# Patient Record
Sex: MALE | Race: WHITE | NOT HISPANIC OR LATINO | Employment: OTHER | URBAN - NONMETROPOLITAN AREA
[De-identification: names, ages, dates, MRNs, and addresses within clinical notes are randomized per-mention and may not be internally consistent; named-entity substitution may affect disease eponyms.]

---

## 2017-06-07 ENCOUNTER — HOSPITAL ENCOUNTER (OUTPATIENT)
Dept: ULTRASOUND IMAGING | Facility: HOSPITAL | Age: 72
Discharge: HOME | End: 2017-06-07
Attending: UROLOGY | Admitting: UROLOGY

## 2017-06-07 ENCOUNTER — OFFICE VISIT (OUTPATIENT)
Dept: UROLOGY | Facility: CLINIC | Age: 72
End: 2017-06-07

## 2017-06-07 VITALS
SYSTOLIC BLOOD PRESSURE: 110 MMHG | HEIGHT: 68 IN | WEIGHT: 178 LBS | HEART RATE: 99 BPM | DIASTOLIC BLOOD PRESSURE: 79 MMHG | BODY MASS INDEX: 26.98 KG/M2

## 2017-06-07 DIAGNOSIS — N50.819 ORCHALGIA: ICD-10-CM

## 2017-06-07 DIAGNOSIS — N40.1 BENIGN NON-NODULAR PROSTATIC HYPERPLASIA WITH LOWER URINARY TRACT SYMPTOMS: Primary | ICD-10-CM

## 2017-06-07 LAB — PSA SERPL-MCNC: 0.66 NG/ML (ref 0–4)

## 2017-06-07 PROCEDURE — 99204 OFFICE O/P NEW MOD 45 MIN: CPT | Performed by: UROLOGY

## 2017-06-07 PROCEDURE — 84153 ASSAY OF PSA TOTAL: CPT | Performed by: UROLOGY

## 2017-06-07 PROCEDURE — 36415 COLL VENOUS BLD VENIPUNCTURE: CPT | Performed by: UROLOGY

## 2017-06-07 PROCEDURE — 76870 US EXAM SCROTUM: CPT

## 2017-06-07 PROCEDURE — 76870 US EXAM SCROTUM: CPT | Performed by: RADIOLOGY

## 2017-06-07 RX ORDER — FENOFIBRATE 160 MG/1
TABLET ORAL
Refills: 1 | COMMUNITY
Start: 2017-03-20

## 2017-06-07 RX ORDER — MELATONIN
1000 DAILY
COMMUNITY

## 2017-06-07 RX ORDER — ALPRAZOLAM 1 MG/1
TABLET ORAL
Refills: 5 | COMMUNITY
Start: 2017-05-19

## 2017-06-07 RX ORDER — ESCITALOPRAM OXALATE 10 MG/1
TABLET ORAL
Refills: 5 | COMMUNITY
Start: 2017-05-19

## 2017-06-07 RX ORDER — LEVOTHYROXINE SODIUM 0.07 MG/1
TABLET ORAL
Refills: 1 | COMMUNITY
Start: 2017-03-20

## 2017-06-07 RX ORDER — CETIRIZINE HYDROCHLORIDE 10 MG/1
TABLET ORAL
Refills: 3 | COMMUNITY
Start: 2017-03-20

## 2017-06-07 RX ORDER — EZETIMIBE 10 MG/1
TABLET ORAL
Refills: 1 | COMMUNITY
Start: 2017-03-20

## 2017-06-07 RX ORDER — POLYETHYLENE GLYCOL 3350 17 G/17G
POWDER, FOR SOLUTION ORAL
Refills: 5 | COMMUNITY
Start: 2017-05-30

## 2017-06-07 RX ORDER — ZOLPIDEM TARTRATE 10 MG/1
TABLET ORAL
Refills: 5 | COMMUNITY
Start: 2017-05-19

## 2017-06-07 RX ORDER — ESCITALOPRAM OXALATE 20 MG/1
TABLET ORAL
Refills: 5 | COMMUNITY
Start: 2017-05-19

## 2017-06-07 RX ORDER — AMLODIPINE BESYLATE 5 MG/1
TABLET ORAL
Refills: 1 | COMMUNITY
Start: 2017-03-20

## 2017-06-07 RX ORDER — TESTOSTERONE 12.5 MG/1.25G
25 GEL TOPICAL DAILY
COMMUNITY

## 2017-06-07 RX ORDER — PRAVASTATIN SODIUM 20 MG
TABLET ORAL
Refills: 1 | COMMUNITY
Start: 2017-03-20

## 2017-06-07 NOTE — PROGRESS NOTES
Chief Complaint:          Chief Complaint   Patient presents with   • Testicle Pain     knot in left testicle after Vasectomy in 1973 / pain started about 10-12 years ago.   • Prostate Check       HPI:   71 y.o. male.  Pt noticed a knot in the left scrotum after his vasectomy 40 years ago.  He is also here for a prostate check.  His lesion in the left scrotum has caused more and more pain this past year  HPI        Past Medical History:        Past Medical History:   Diagnosis Date   • Anxiety disorder    • Arthritis    • Depression    • High blood pressure    • High cholesterol    • High triglycerides    • Panic attacks    • Thyroid disorder          Current Meds:     Current Outpatient Prescriptions   Medication Sig Dispense Refill   • ALPRAZolam (XANAX) 1 MG tablet   5   • amLODIPine (NORVASC) 5 MG tablet   1   • cetirizine (zyrTEC) 10 MG tablet   3   • cholecalciferol (VITAMIN D3) 1000 UNITS tablet Take 1,000 Units by mouth Daily.     • escitalopram (LEXAPRO) 10 MG tablet   5   • escitalopram (LEXAPRO) 20 MG tablet   5   • fenofibrate 160 MG tablet   1   • levothyroxine (SYNTHROID, LEVOTHROID) 75 MCG tablet   1   • LYRICA 100 MG capsule   2   • polyethylene glycol (MIRALAX) powder   5   • pravastatin (PRAVACHOL) 20 MG tablet   1   • testosterone (ANDROGEL) 25 MG/2.5GM (1%) gel gel Place 25 mg on the skin Daily.     • ZETIA 10 MG tablet   1   • zolpidem (AMBIEN) 10 MG tablet   5     No current facility-administered medications for this visit.         Allergies:      Allergies   Allergen Reactions   • Aspirin    • Nsaids         Past Surgical History:     Past Surgical History:   Procedure Laterality Date   • NECK SURGERY      disc fusion         Social History:     Social History     Social History   • Marital status: Unknown     Spouse name: N/A   • Number of children: N/A   • Years of education: N/A     Occupational History   • Not on file.     Social History Main Topics   • Smoking status: Never Smoker   •  Smokeless tobacco: Never Used   • Alcohol use No   • Drug use: No   • Sexual activity: Not on file     Other Topics Concern   • Not on file     Social History Narrative   • No narrative on file       Family History:     Family History   Problem Relation Age of Onset   • Prostate cancer Brother    • Heart disease Brother        Review of Systems:     Review of Systems   Genitourinary: Positive for testicular pain.   All other systems reviewed and are negative.      Physical Exam:     Physical Exam   Constitutional: He is oriented to person, place, and time.   HENT:   Head: Normocephalic and atraumatic.   Right Ear: External ear normal.   Left Ear: External ear normal.   Nose: Nose normal.   Mouth/Throat: Oropharynx is clear and moist.   Eyes: Conjunctivae and EOM are normal. Pupils are equal, round, and reactive to light.   Neck: Normal range of motion. Neck supple. No thyromegaly present.   Cardiovascular: Normal rate, regular rhythm, normal heart sounds and intact distal pulses.    No murmur heard.  Pulmonary/Chest: Effort normal and breath sounds normal. No respiratory distress. He has no wheezes. He has no rales. He exhibits no tenderness.   Abdominal: Soft. Bowel sounds are normal. He exhibits no distension and no mass. There is no tenderness. No hernia.   Genitourinary: Rectum normal, prostate normal and penis normal.   Musculoskeletal: Normal range of motion. He exhibits no edema or tenderness.   Lymphadenopathy:     He has no cervical adenopathy.   Neurological: He is alert and oriented to person, place, and time. No cranial nerve deficit. He exhibits normal muscle tone. Coordination normal.   Skin: Skin is warm. No rash noted.   Psychiatric: He has a normal mood and affect. His behavior is normal. Judgment and thought content normal.   Nursing note and vitals reviewed.      Procedure:     No notes on file      Assessment:     Encounter Diagnoses   Name Primary?   • Benign non-nodular prostatic hyperplasia  with lower urinary tract symptoms Yes   • Orchalgia      Orders Placed This Encounter   Procedures   • US Scrotum & Testicles     Standing Status:   Future     Standing Expiration Date:   6/7/2018     Order Specific Question:   Reason for Exam:     Answer:   scrotal lesion   • PSA       Plan:   Will check a psa today and will order a scrotal ultrasound.  His exam today just showed testicular atrophy.    Counseling was given to patient for the following topics impressions. and the interim medical history and current results were reviewed.  A treatment plan with follow-up was made. Total time of the encounter was 45 minutes and 45 minutes were spent discussing Benign non-nodular prostatic hyperplasia with lower urinary tract symptoms [N40.1] face-to-face.        This document has been electronically signed by Chaitanya Gregg MD June 7, 2017 1:36 PM

## 2017-08-01 ENCOUNTER — OFFICE VISIT (OUTPATIENT)
Dept: UROLOGY | Facility: CLINIC | Age: 72
End: 2017-08-01

## 2017-08-01 VITALS — WEIGHT: 178 LBS | HEIGHT: 68 IN | BODY MASS INDEX: 26.98 KG/M2

## 2017-08-01 DIAGNOSIS — N50.819 ORCHALGIA: Primary | ICD-10-CM

## 2017-08-01 PROCEDURE — 99213 OFFICE O/P EST LOW 20 MIN: CPT | Performed by: UROLOGY

## 2017-08-01 NOTE — PROGRESS NOTES
Chief Complaint:          Chief Complaint   Patient presents with   • Orchalgia     FU for Orchalgia.  Scrotal US and PSA results today.       HPI:   71 y.o. male.    HPI  Pt's psa is normal at 0.66.  His left testicle has pain with sitting.  His scrotal ultrasound is without masses.      Past Medical History:        Past Medical History:   Diagnosis Date   • Anxiety disorder    • Arthritis    • Depression    • High blood pressure    • High cholesterol    • High triglycerides    • Panic attacks    • Thyroid disorder          Current Meds:     Current Outpatient Prescriptions   Medication Sig Dispense Refill   • ALPRAZolam (XANAX) 1 MG tablet   5   • amLODIPine (NORVASC) 5 MG tablet   1   • cetirizine (zyrTEC) 10 MG tablet   3   • cholecalciferol (VITAMIN D3) 1000 UNITS tablet Take 1,000 Units by mouth Daily.     • escitalopram (LEXAPRO) 10 MG tablet   5   • escitalopram (LEXAPRO) 20 MG tablet   5   • fenofibrate 160 MG tablet   1   • levothyroxine (SYNTHROID, LEVOTHROID) 75 MCG tablet   1   • LYRICA 100 MG capsule   2   • polyethylene glycol (MIRALAX) powder   5   • pravastatin (PRAVACHOL) 20 MG tablet   1   • testosterone (ANDROGEL) 25 MG/2.5GM (1%) gel gel Place 25 mg on the skin Daily.     • ZETIA 10 MG tablet   1   • zolpidem (AMBIEN) 10 MG tablet   5     No current facility-administered medications for this visit.         Allergies:      Allergies   Allergen Reactions   • Aspirin    • Nsaids         Past Surgical History:     Past Surgical History:   Procedure Laterality Date   • NECK SURGERY      disc fusion         Social History:     Social History     Social History   • Marital status: Unknown     Spouse name: N/A   • Number of children: N/A   • Years of education: N/A     Occupational History   • Not on file.     Social History Main Topics   • Smoking status: Never Smoker   • Smokeless tobacco: Never Used   • Alcohol use No   • Drug use: No   • Sexual activity: Not on file     Other Topics Concern   • Not  on file     Social History Narrative   • No narrative on file       Family History:     Family History   Problem Relation Age of Onset   • Prostate cancer Brother    • Heart disease Brother        Review of Systems:     Review of Systems    Physical Exam:     Physical Exam    Procedure:     No notes on file      Assessment:     Encounter Diagnosis   Name Primary?   • Orchalgia Yes     No orders of the defined types were placed in this encounter.      Plan:   I discussed idiopathic testicular pain.  Return 6 months for repeat scrotal exam.    Counseling was given to patient for the following topics diagnostic results. and the interim medical history and current results were reviewed.  A treatment plan with follow-up was made. Total time of the encounter was 21 minutes and 21 minutes were spent discussing Orchalgia [N50.819] face-to-face.        This document has been electronically signed by Chaitanya Gregg MD August 1, 2017 11:51 AM

## 2023-08-04 ENCOUNTER — TRANSCRIBE ORDERS (OUTPATIENT)
Dept: ADMINISTRATIVE | Facility: HOSPITAL | Age: 78
End: 2023-08-04
Payer: MEDICARE

## 2023-08-04 DIAGNOSIS — K63.89 CECUM MASS: Primary | ICD-10-CM

## 2023-08-18 ENCOUNTER — PRE-ADMISSION TESTING (OUTPATIENT)
Dept: PREADMISSION TESTING | Facility: HOSPITAL | Age: 78
End: 2023-08-18
Payer: MEDICARE

## 2023-08-18 ENCOUNTER — HOSPITAL ENCOUNTER (OUTPATIENT)
Dept: CT IMAGING | Facility: HOSPITAL | Age: 78
Discharge: HOME | End: 2023-08-18
Payer: MEDICARE

## 2023-08-18 VITALS — BODY MASS INDEX: 22.3 KG/M2 | HEIGHT: 68 IN | WEIGHT: 147.16 LBS

## 2023-08-18 DIAGNOSIS — K63.89 CECUM MASS: ICD-10-CM

## 2023-08-18 DIAGNOSIS — Z01.89 LABORATORY TEST: Primary | ICD-10-CM

## 2023-08-18 LAB
ABO GROUP BLD: NORMAL
ALBUMIN SERPL-MCNC: 4.6 G/DL (ref 3.5–5.2)
ALBUMIN/GLOB SERPL: 1.6 G/DL
ALP SERPL-CCNC: 58 U/L (ref 39–117)
ALT SERPL W P-5'-P-CCNC: 16 U/L (ref 1–41)
ANION GAP SERPL CALCULATED.3IONS-SCNC: 9 MMOL/L (ref 5–15)
AST SERPL-CCNC: 18 U/L (ref 1–40)
BILIRUB SERPL-MCNC: 0.5 MG/DL (ref 0–1.2)
BUN SERPL-MCNC: 13 MG/DL (ref 8–23)
BUN/CREAT SERPL: 17.1 (ref 7–25)
CALCIUM SPEC-SCNC: 9.1 MG/DL (ref 8.6–10.5)
CHLORIDE SERPL-SCNC: 103 MMOL/L (ref 98–107)
CO2 SERPL-SCNC: 28 MMOL/L (ref 22–29)
CREAT SERPL-MCNC: 0.76 MG/DL (ref 0.76–1.27)
DEPRECATED RDW RBC AUTO: 45.5 FL (ref 37–54)
EGFRCR SERPLBLD CKD-EPI 2021: 93.2 ML/MIN/1.73
ERYTHROCYTE [DISTWIDTH] IN BLOOD BY AUTOMATED COUNT: 12.7 % (ref 12.3–15.4)
GLOBULIN UR ELPH-MCNC: 2.8 GM/DL
GLUCOSE SERPL-MCNC: 112 MG/DL (ref 65–99)
HBA1C MFR BLD: 5.5 % (ref 4.8–5.6)
HCT VFR BLD AUTO: 46.3 % (ref 37.5–51)
HGB BLD-MCNC: 15.6 G/DL (ref 13–17.7)
MCH RBC QN AUTO: 32.5 PG (ref 26.6–33)
MCHC RBC AUTO-ENTMCNC: 33.7 G/DL (ref 31.5–35.7)
MCV RBC AUTO: 96.5 FL (ref 79–97)
PLATELET # BLD AUTO: 176 10*3/MM3 (ref 140–450)
PMV BLD AUTO: 11.4 FL (ref 6–12)
POTASSIUM SERPL-SCNC: 4.3 MMOL/L (ref 3.5–5.2)
PROT SERPL-MCNC: 7.4 G/DL (ref 6–8.5)
QT INTERVAL: 378 MS
QTC INTERVAL: 378 MS
RBC # BLD AUTO: 4.8 10*6/MM3 (ref 4.14–5.8)
RH BLD: POSITIVE
SODIUM SERPL-SCNC: 140 MMOL/L (ref 136–145)
WBC NRBC COR # BLD: 7.55 10*3/MM3 (ref 3.4–10.8)

## 2023-08-18 PROCEDURE — 93010 ELECTROCARDIOGRAM REPORT: CPT | Performed by: INTERNAL MEDICINE

## 2023-08-18 PROCEDURE — 80053 COMPREHEN METABOLIC PANEL: CPT

## 2023-08-18 PROCEDURE — 86901 BLOOD TYPING SEROLOGIC RH(D): CPT

## 2023-08-18 PROCEDURE — 85027 COMPLETE CBC AUTOMATED: CPT

## 2023-08-18 PROCEDURE — 36415 COLL VENOUS BLD VENIPUNCTURE: CPT

## 2023-08-18 PROCEDURE — 74177 CT ABD & PELVIS W/CONTRAST: CPT

## 2023-08-18 PROCEDURE — 86900 BLOOD TYPING SEROLOGIC ABO: CPT

## 2023-08-18 PROCEDURE — 83036 HEMOGLOBIN GLYCOSYLATED A1C: CPT

## 2023-08-18 PROCEDURE — 93005 ELECTROCARDIOGRAM TRACING: CPT

## 2023-08-18 PROCEDURE — 25510000001 IOPAMIDOL 61 % SOLUTION: Performed by: COLON & RECTAL SURGERY

## 2023-08-18 RX ORDER — TRAMADOL HYDROCHLORIDE 50 MG/1
50 TABLET ORAL DAILY PRN
COMMUNITY

## 2023-08-18 RX ORDER — PREGABALIN 100 MG/1
100 CAPSULE ORAL 2 TIMES DAILY
COMMUNITY

## 2023-08-18 RX ORDER — CYCLOSPORINE 0.5 MG/ML
1 EMULSION OPHTHALMIC 2 TIMES DAILY
COMMUNITY

## 2023-08-18 RX ORDER — OLANZAPINE 20 MG/1
20 TABLET, ORALLY DISINTEGRATING ORAL NIGHTLY
COMMUNITY

## 2023-08-18 RX ADMIN — IOPAMIDOL 95 ML: 612 INJECTION, SOLUTION INTRAVENOUS at 15:57

## 2023-08-18 NOTE — NURSING NOTE
Patient seen in preadmission testing and marked for unlikely diverting loop ileostomy.    Site marked: Right upper quadrant    Patient placed in sitting position and asked to lean forward and side to side.    Rectus muscle was identified, and the ky was placed within the patient's visual field. Bony prominences, scars, and creases were avoided. Marked on the flattest area (summit) of the infraumbilical fat mound.    Marking pen used, covered with Tegaderm.  Extra Tegaderm and marker  given to patient in case marking becomes faded before surgery.  Please let us know day of surgery if marking completely gone.    WOCN will continue to follow daily if stoma warranted.

## 2023-08-18 NOTE — PAT
"Patient viewed general PAT education video as instructed in their preoperative information received from their surgeon.  Patient stated the general PAT education video was viewed in its entirety and survey completed.  Copies of PAT general education handouts (Incentive Spirometry, Meds to Beds Program, Patient Belongings, Pre-op skin preparation instructions, Blood Glucose testing, Visitor policy, Surgery FAQ, Code H) distributed to patient if not printed. Education related to the PAT pass and skin preparation for surgery (if applicable) completed in PAT as a reinforcement to PAT education video. Patient instructed to return PAT pass provided today as well as completed skin preparation sheet (if applicable) on the day of procedure.     Additionally if patient had not viewed video yet but intended to view it at home or in our waiting area, then referred them to the handout with QR code/link provided during PAT visit.  Instructed patient to complete survey after viewing the video in its entirety.  Encouraged patient/family to read [facility] general education handouts thoroughly and notify PAT staff with any questions or concerns. Patient verbalized understanding of all information and priority content.    Patient to apply Chlorhexadine wipes  to surgical area (as instructed) the night before procedure and the AM of procedure. Wipes provided.    Patient instructed to drink 20 ounces of Gatorade and it needs to be completed 1 hour (for Main OR patients) or 2 hours (scheduled  section & [facility] patients) before given arrival time for procedure (NO RED Gatorade)    Patient verbalized understanding.    Ten (8 ounce) Impact Advanced Recovery Nutritional Drinks distributed to patient from Pre Admission Testing Department.  Verbal and written instructions given that patient must consume two (8 ounce) Impact drinks a day for five days before surgery.  Flavoring tip sheet provided as well the brochure \"Go in Stronger. Get " "Home Sooner\" that explains benefits of nutritional drinks to enhance recovery. Patient/family verbalized understanding.     Too early to draw type and screen in PAT.  Please obtain blood bank specimen in pre-op on the day of surgery.    Notified Alison JAMES (GI Nurse Navigator) of upcoming surgery.     WOC RN, saw pt after PAT.       "

## 2023-08-28 ENCOUNTER — ANESTHESIA EVENT (OUTPATIENT)
Dept: PERIOP | Facility: HOSPITAL | Age: 78
DRG: 331 | End: 2023-08-28
Payer: MEDICARE

## 2023-08-28 RX ORDER — SODIUM CHLORIDE 0.9 % (FLUSH) 0.9 %
10 SYRINGE (ML) INJECTION EVERY 12 HOURS SCHEDULED
Status: CANCELLED | OUTPATIENT
Start: 2023-08-28

## 2023-08-28 RX ORDER — FAMOTIDINE 10 MG/ML
20 INJECTION, SOLUTION INTRAVENOUS ONCE
Status: CANCELLED | OUTPATIENT
Start: 2023-08-28 | End: 2023-08-28

## 2023-08-28 RX ORDER — SODIUM CHLORIDE 0.9 % (FLUSH) 0.9 %
10 SYRINGE (ML) INJECTION AS NEEDED
Status: CANCELLED | OUTPATIENT
Start: 2023-08-28

## 2023-08-28 RX ORDER — SODIUM CHLORIDE 9 MG/ML
40 INJECTION, SOLUTION INTRAVENOUS AS NEEDED
Status: CANCELLED | OUTPATIENT
Start: 2023-08-28

## 2023-08-29 ENCOUNTER — ANESTHESIA EVENT CONVERTED (OUTPATIENT)
Dept: ANESTHESIOLOGY | Facility: HOSPITAL | Age: 78
DRG: 331 | End: 2023-08-29
Payer: MEDICARE

## 2023-08-29 ENCOUNTER — ANESTHESIA (OUTPATIENT)
Dept: PERIOP | Facility: HOSPITAL | Age: 78
DRG: 331 | End: 2023-08-29
Payer: MEDICARE

## 2023-08-29 ENCOUNTER — HOSPITAL ENCOUNTER (INPATIENT)
Facility: HOSPITAL | Age: 78
LOS: 2 days | Discharge: HOME | DRG: 331 | End: 2023-08-31
Attending: COLON & RECTAL SURGERY | Admitting: INTERNAL MEDICINE
Payer: MEDICARE

## 2023-08-29 DIAGNOSIS — K63.89 CECUM MASS: ICD-10-CM

## 2023-08-29 DIAGNOSIS — Z98.890 S/P LAPAROSCOPY: Primary | ICD-10-CM

## 2023-08-29 PROBLEM — D49.0 COLON NEOPLASM: Status: ACTIVE | Noted: 2023-08-29

## 2023-08-29 PROBLEM — F41.9 ANXIETY AND DEPRESSION: Status: ACTIVE | Noted: 2023-08-29

## 2023-08-29 PROBLEM — E03.9 HYPOTHYROIDISM (ACQUIRED): Status: ACTIVE | Noted: 2023-08-29

## 2023-08-29 PROBLEM — F32.A ANXIETY AND DEPRESSION: Status: ACTIVE | Noted: 2023-08-29

## 2023-08-29 PROBLEM — I10 HTN (HYPERTENSION): Status: ACTIVE | Noted: 2023-08-29

## 2023-08-29 LAB
ABO GROUP BLD: NORMAL
BLD GP AB SCN SERPL QL: NEGATIVE
RH BLD: POSITIVE
T&S EXPIRATION DATE: NORMAL

## 2023-08-29 PROCEDURE — 25010000002 SODIUM CHLORIDE 0.9 % WITH KCL 20 MEQ 20-0.9 MEQ/L-% SOLUTION: Performed by: COLON & RECTAL SURGERY

## 2023-08-29 PROCEDURE — 25010000002 FENTANYL CITRATE (PF) 100 MCG/2ML SOLUTION: Performed by: ANESTHESIOLOGY

## 2023-08-29 PROCEDURE — 88309 TISSUE EXAM BY PATHOLOGIST: CPT | Performed by: COLON & RECTAL SURGERY

## 2023-08-29 PROCEDURE — 25010000002 ERTAPENEM PER 500 MG: Performed by: COLON & RECTAL SURGERY

## 2023-08-29 PROCEDURE — 86900 BLOOD TYPING SEROLOGIC ABO: CPT | Performed by: COLON & RECTAL SURGERY

## 2023-08-29 PROCEDURE — 25010000002 PROPOFOL 10 MG/ML EMULSION: Performed by: ANESTHESIOLOGY

## 2023-08-29 PROCEDURE — 25010000002 DEXAMETHASONE SODIUM PHOSPHATE 10 MG/ML SOLUTION: Performed by: NURSE ANESTHETIST, CERTIFIED REGISTERED

## 2023-08-29 PROCEDURE — 86901 BLOOD TYPING SEROLOGIC RH(D): CPT | Performed by: COLON & RECTAL SURGERY

## 2023-08-29 PROCEDURE — 25010000002 BUPIVACAINE (PF) 0.25 % SOLUTION: Performed by: NURSE ANESTHETIST, CERTIFIED REGISTERED

## 2023-08-29 PROCEDURE — 25010000002 DEXAMETHASONE PER 1 MG: Performed by: ANESTHESIOLOGY

## 2023-08-29 PROCEDURE — 25010000002 FENTANYL CITRATE (PF) 50 MCG/ML SOLUTION

## 2023-08-29 PROCEDURE — 0DBB4ZZ EXCISION OF ILEUM, PERCUTANEOUS ENDOSCOPIC APPROACH: ICD-10-PCS | Performed by: COLON & RECTAL SURGERY

## 2023-08-29 PROCEDURE — 25010000002 SUGAMMADEX 200 MG/2ML SOLUTION: Performed by: ANESTHESIOLOGY

## 2023-08-29 PROCEDURE — 0DBF4ZZ EXCISION OF RIGHT LARGE INTESTINE, PERCUTANEOUS ENDOSCOPIC APPROACH: ICD-10-PCS | Performed by: COLON & RECTAL SURGERY

## 2023-08-29 PROCEDURE — 25010000002 ONDANSETRON PER 1 MG: Performed by: ANESTHESIOLOGY

## 2023-08-29 PROCEDURE — 25010000002 HEPARIN (PORCINE) PER 1000 UNITS: Performed by: COLON & RECTAL SURGERY

## 2023-08-29 PROCEDURE — 86850 RBC ANTIBODY SCREEN: CPT | Performed by: COLON & RECTAL SURGERY

## 2023-08-29 DEVICE — PROXIMATE RELOADABLE LINEAR CUTTER WITH SAFETY LOCK-OUT, 75MM
Type: IMPLANTABLE DEVICE | Site: ABDOMEN | Status: FUNCTIONAL
Brand: PROXIMATE

## 2023-08-29 DEVICE — PROXIMATE RELOADABLE LINEAR STAPLER
Type: IMPLANTABLE DEVICE | Site: ABDOMEN | Status: FUNCTIONAL
Brand: PROXIMATE

## 2023-08-29 DEVICE — PROXIMATE LINEAR CUTTER RELOAD, BLUE, 75MM
Type: IMPLANTABLE DEVICE | Site: ABDOMEN | Status: FUNCTIONAL
Brand: PROXIMATE

## 2023-08-29 RX ORDER — EPHEDRINE SULFATE 50 MG/ML
INJECTION INTRAVENOUS AS NEEDED
Status: DISCONTINUED | OUTPATIENT
Start: 2023-08-29 | End: 2023-08-29 | Stop reason: SURG

## 2023-08-29 RX ORDER — HYDROMORPHONE HYDROCHLORIDE 1 MG/ML
0.5 INJECTION, SOLUTION INTRAMUSCULAR; INTRAVENOUS; SUBCUTANEOUS
Status: DISCONTINUED | OUTPATIENT
Start: 2023-08-29 | End: 2023-08-31 | Stop reason: HOSPADM

## 2023-08-29 RX ORDER — ONDANSETRON 4 MG/1
4 TABLET, FILM COATED ORAL EVERY 6 HOURS PRN
Status: DISCONTINUED | OUTPATIENT
Start: 2023-08-29 | End: 2023-08-31 | Stop reason: HOSPADM

## 2023-08-29 RX ORDER — DROPERIDOL 2.5 MG/ML
0.62 INJECTION, SOLUTION INTRAMUSCULAR; INTRAVENOUS ONCE AS NEEDED
Status: DISCONTINUED | OUTPATIENT
Start: 2023-08-29 | End: 2023-08-29 | Stop reason: HOSPADM

## 2023-08-29 RX ORDER — IBUPROFEN 400 MG/1
400 TABLET ORAL EVERY 6 HOURS PRN
Status: DISCONTINUED | OUTPATIENT
Start: 2023-08-29 | End: 2023-08-29

## 2023-08-29 RX ORDER — GABAPENTIN 100 MG/1
100 CAPSULE ORAL 2 TIMES DAILY
Status: DISCONTINUED | OUTPATIENT
Start: 2023-08-29 | End: 2023-08-29

## 2023-08-29 RX ORDER — SODIUM CHLORIDE 9 MG/ML
40 INJECTION, SOLUTION INTRAVENOUS AS NEEDED
Status: DISCONTINUED | OUTPATIENT
Start: 2023-08-29 | End: 2023-08-29 | Stop reason: HOSPADM

## 2023-08-29 RX ORDER — GLYCOPYRROLATE 0.2 MG/ML
INJECTION INTRAMUSCULAR; INTRAVENOUS AS NEEDED
Status: DISCONTINUED | OUTPATIENT
Start: 2023-08-29 | End: 2023-08-29 | Stop reason: SURG

## 2023-08-29 RX ORDER — HYDROCODONE BITARTRATE AND ACETAMINOPHEN 7.5; 325 MG/1; MG/1
1 TABLET ORAL EVERY 4 HOURS PRN
Status: DISCONTINUED | OUTPATIENT
Start: 2023-08-29 | End: 2023-08-31 | Stop reason: HOSPADM

## 2023-08-29 RX ORDER — CYCLOSPORINE 0.5 MG/ML
1 EMULSION OPHTHALMIC 2 TIMES DAILY
Status: DISCONTINUED | OUTPATIENT
Start: 2023-08-29 | End: 2023-08-31 | Stop reason: HOSPADM

## 2023-08-29 RX ORDER — ALVIMOPAN 12 MG/1
12 CAPSULE ORAL ONCE
Status: COMPLETED | OUTPATIENT
Start: 2023-08-29 | End: 2023-08-29

## 2023-08-29 RX ORDER — FENTANYL CITRATE 50 UG/ML
INJECTION, SOLUTION INTRAMUSCULAR; INTRAVENOUS
Status: COMPLETED
Start: 2023-08-29 | End: 2023-08-29

## 2023-08-29 RX ORDER — FENTANYL CITRATE 50 UG/ML
50 INJECTION, SOLUTION INTRAMUSCULAR; INTRAVENOUS
Status: DISCONTINUED | OUTPATIENT
Start: 2023-08-29 | End: 2023-08-29 | Stop reason: HOSPADM

## 2023-08-29 RX ORDER — OLANZAPINE 5 MG/1
20 TABLET, ORALLY DISINTEGRATING ORAL NIGHTLY
Status: DISCONTINUED | OUTPATIENT
Start: 2023-08-29 | End: 2023-08-31 | Stop reason: HOSPADM

## 2023-08-29 RX ORDER — PROPOFOL 10 MG/ML
VIAL (ML) INTRAVENOUS AS NEEDED
Status: DISCONTINUED | OUTPATIENT
Start: 2023-08-29 | End: 2023-08-29 | Stop reason: SURG

## 2023-08-29 RX ORDER — LABETALOL HYDROCHLORIDE 5 MG/ML
5 INJECTION, SOLUTION INTRAVENOUS
Status: DISCONTINUED | OUTPATIENT
Start: 2023-08-29 | End: 2023-08-29 | Stop reason: HOSPADM

## 2023-08-29 RX ORDER — CETIRIZINE HYDROCHLORIDE 10 MG/1
10 TABLET ORAL DAILY PRN
Status: DISCONTINUED | OUTPATIENT
Start: 2023-08-29 | End: 2023-08-31 | Stop reason: HOSPADM

## 2023-08-29 RX ORDER — HYDROCODONE BITARTRATE AND ACETAMINOPHEN 5; 325 MG/1; MG/1
1 TABLET ORAL ONCE AS NEEDED
Status: DISCONTINUED | OUTPATIENT
Start: 2023-08-29 | End: 2023-08-29 | Stop reason: HOSPADM

## 2023-08-29 RX ORDER — DEXAMETHASONE SODIUM PHOSPHATE 10 MG/ML
INJECTION, SOLUTION INTRAMUSCULAR; INTRAVENOUS
Status: COMPLETED | OUTPATIENT
Start: 2023-08-29 | End: 2023-08-29

## 2023-08-29 RX ORDER — HEPARIN SODIUM 5000 [USP'U]/ML
5000 INJECTION, SOLUTION INTRAVENOUS; SUBCUTANEOUS ONCE
Status: COMPLETED | OUTPATIENT
Start: 2023-08-29 | End: 2023-08-29

## 2023-08-29 RX ORDER — HYDROMORPHONE HYDROCHLORIDE 1 MG/ML
0.5 INJECTION, SOLUTION INTRAMUSCULAR; INTRAVENOUS; SUBCUTANEOUS
Status: DISCONTINUED | OUTPATIENT
Start: 2023-08-29 | End: 2023-08-29 | Stop reason: HOSPADM

## 2023-08-29 RX ORDER — DIAZEPAM 5 MG/1
5 TABLET ORAL EVERY 6 HOURS PRN
Status: DISCONTINUED | OUTPATIENT
Start: 2023-08-29 | End: 2023-08-31 | Stop reason: HOSPADM

## 2023-08-29 RX ORDER — NALOXONE HCL 0.4 MG/ML
0.4 VIAL (ML) INJECTION
Status: DISCONTINUED | OUTPATIENT
Start: 2023-08-29 | End: 2023-08-31 | Stop reason: HOSPADM

## 2023-08-29 RX ORDER — HYDRALAZINE HYDROCHLORIDE 20 MG/ML
5 INJECTION INTRAMUSCULAR; INTRAVENOUS
Status: DISCONTINUED | OUTPATIENT
Start: 2023-08-29 | End: 2023-08-29 | Stop reason: HOSPADM

## 2023-08-29 RX ORDER — NALOXONE HCL 0.4 MG/ML
0.4 VIAL (ML) INJECTION AS NEEDED
Status: DISCONTINUED | OUTPATIENT
Start: 2023-08-29 | End: 2023-08-29 | Stop reason: HOSPADM

## 2023-08-29 RX ORDER — DEXAMETHASONE SODIUM PHOSPHATE 4 MG/ML
INJECTION, SOLUTION INTRA-ARTICULAR; INTRALESIONAL; INTRAMUSCULAR; INTRAVENOUS; SOFT TISSUE AS NEEDED
Status: DISCONTINUED | OUTPATIENT
Start: 2023-08-29 | End: 2023-08-29 | Stop reason: SURG

## 2023-08-29 RX ORDER — ACETAMINOPHEN 325 MG/1
650 TABLET ORAL EVERY 8 HOURS
Status: DISCONTINUED | OUTPATIENT
Start: 2023-08-29 | End: 2023-08-31 | Stop reason: HOSPADM

## 2023-08-29 RX ORDER — ZOLPIDEM TARTRATE 5 MG/1
10 TABLET ORAL NIGHTLY PRN
Status: DISCONTINUED | OUTPATIENT
Start: 2023-08-29 | End: 2023-08-31 | Stop reason: HOSPADM

## 2023-08-29 RX ORDER — LIDOCAINE HYDROCHLORIDE 10 MG/ML
INJECTION, SOLUTION EPIDURAL; INFILTRATION; INTRACAUDAL; PERINEURAL AS NEEDED
Status: DISCONTINUED | OUTPATIENT
Start: 2023-08-29 | End: 2023-08-29 | Stop reason: SURG

## 2023-08-29 RX ORDER — LEVOTHYROXINE SODIUM 0.05 MG/1
50 TABLET ORAL
Status: DISCONTINUED | OUTPATIENT
Start: 2023-08-30 | End: 2023-08-31 | Stop reason: HOSPADM

## 2023-08-29 RX ORDER — NITROGLYCERIN 0.4 MG/1
0.4 TABLET SUBLINGUAL
Status: DISCONTINUED | OUTPATIENT
Start: 2023-08-29 | End: 2023-08-31 | Stop reason: HOSPADM

## 2023-08-29 RX ORDER — LIDOCAINE HYDROCHLORIDE 10 MG/ML
0.5 INJECTION, SOLUTION EPIDURAL; INFILTRATION; INTRACAUDAL; PERINEURAL ONCE AS NEEDED
Status: COMPLETED | OUTPATIENT
Start: 2023-08-29 | End: 2023-08-29

## 2023-08-29 RX ORDER — AMLODIPINE BESYLATE 2.5 MG/1
2.5 TABLET ORAL DAILY
Status: DISCONTINUED | OUTPATIENT
Start: 2023-08-30 | End: 2023-08-31 | Stop reason: HOSPADM

## 2023-08-29 RX ORDER — SODIUM CHLORIDE 0.9 % (FLUSH) 0.9 %
3 SYRINGE (ML) INJECTION EVERY 12 HOURS SCHEDULED
Status: DISCONTINUED | OUTPATIENT
Start: 2023-08-29 | End: 2023-08-29 | Stop reason: HOSPADM

## 2023-08-29 RX ORDER — HEPARIN SODIUM 5000 [USP'U]/ML
5000 INJECTION, SOLUTION INTRAVENOUS; SUBCUTANEOUS EVERY 8 HOURS SCHEDULED
Status: DISCONTINUED | OUTPATIENT
Start: 2023-08-30 | End: 2023-08-31 | Stop reason: HOSPADM

## 2023-08-29 RX ORDER — ONDANSETRON 2 MG/ML
INJECTION INTRAMUSCULAR; INTRAVENOUS AS NEEDED
Status: DISCONTINUED | OUTPATIENT
Start: 2023-08-29 | End: 2023-08-29 | Stop reason: SURG

## 2023-08-29 RX ORDER — ONDANSETRON 2 MG/ML
4 INJECTION INTRAMUSCULAR; INTRAVENOUS EVERY 6 HOURS PRN
Status: DISCONTINUED | OUTPATIENT
Start: 2023-08-29 | End: 2023-08-31 | Stop reason: HOSPADM

## 2023-08-29 RX ORDER — DROPERIDOL 2.5 MG/ML
0.62 INJECTION, SOLUTION INTRAMUSCULAR; INTRAVENOUS
Status: DISCONTINUED | OUTPATIENT
Start: 2023-08-29 | End: 2023-08-29 | Stop reason: HOSPADM

## 2023-08-29 RX ORDER — SODIUM CHLORIDE AND POTASSIUM CHLORIDE 150; 900 MG/100ML; MG/100ML
100 INJECTION, SOLUTION INTRAVENOUS CONTINUOUS
Status: DISCONTINUED | OUTPATIENT
Start: 2023-08-29 | End: 2023-08-30

## 2023-08-29 RX ORDER — ESCITALOPRAM OXALATE 20 MG/1
40 TABLET ORAL EVERY MORNING
Status: DISCONTINUED | OUTPATIENT
Start: 2023-08-30 | End: 2023-08-31 | Stop reason: HOSPADM

## 2023-08-29 RX ORDER — FAMOTIDINE 20 MG/1
20 TABLET, FILM COATED ORAL ONCE
Status: COMPLETED | OUTPATIENT
Start: 2023-08-29 | End: 2023-08-29

## 2023-08-29 RX ORDER — BUPIVACAINE HYDROCHLORIDE 2.5 MG/ML
INJECTION, SOLUTION EPIDURAL; INFILTRATION; INTRACAUDAL
Status: COMPLETED | OUTPATIENT
Start: 2023-08-29 | End: 2023-08-29

## 2023-08-29 RX ORDER — FENTANYL CITRATE 50 UG/ML
INJECTION, SOLUTION INTRAMUSCULAR; INTRAVENOUS AS NEEDED
Status: DISCONTINUED | OUTPATIENT
Start: 2023-08-29 | End: 2023-08-29 | Stop reason: SURG

## 2023-08-29 RX ORDER — LABETALOL HYDROCHLORIDE 5 MG/ML
10 INJECTION, SOLUTION INTRAVENOUS EVERY 4 HOURS PRN
Status: DISCONTINUED | OUTPATIENT
Start: 2023-08-29 | End: 2023-08-31 | Stop reason: HOSPADM

## 2023-08-29 RX ORDER — SODIUM CHLORIDE 0.9 % (FLUSH) 0.9 %
3-10 SYRINGE (ML) INJECTION AS NEEDED
Status: DISCONTINUED | OUTPATIENT
Start: 2023-08-29 | End: 2023-08-29 | Stop reason: HOSPADM

## 2023-08-29 RX ORDER — PHENYLEPHRINE HCL IN 0.9% NACL 1 MG/10 ML
SYRINGE (ML) INTRAVENOUS AS NEEDED
Status: DISCONTINUED | OUTPATIENT
Start: 2023-08-29 | End: 2023-08-29 | Stop reason: SURG

## 2023-08-29 RX ORDER — ONDANSETRON 2 MG/ML
4 INJECTION INTRAMUSCULAR; INTRAVENOUS ONCE AS NEEDED
Status: DISCONTINUED | OUTPATIENT
Start: 2023-08-29 | End: 2023-08-29 | Stop reason: HOSPADM

## 2023-08-29 RX ORDER — MAGNESIUM HYDROXIDE 1200 MG/15ML
LIQUID ORAL AS NEEDED
Status: DISCONTINUED | OUTPATIENT
Start: 2023-08-29 | End: 2023-08-29 | Stop reason: HOSPADM

## 2023-08-29 RX ORDER — MIDAZOLAM HYDROCHLORIDE 1 MG/ML
0.5 INJECTION INTRAMUSCULAR; INTRAVENOUS
Status: DISCONTINUED | OUTPATIENT
Start: 2023-08-29 | End: 2023-08-29 | Stop reason: HOSPADM

## 2023-08-29 RX ORDER — ACETAMINOPHEN 500 MG
1000 TABLET ORAL ONCE
Status: DISCONTINUED | OUTPATIENT
Start: 2023-08-29 | End: 2023-08-29 | Stop reason: HOSPADM

## 2023-08-29 RX ORDER — ROCURONIUM BROMIDE 10 MG/ML
INJECTION, SOLUTION INTRAVENOUS AS NEEDED
Status: DISCONTINUED | OUTPATIENT
Start: 2023-08-29 | End: 2023-08-29 | Stop reason: SURG

## 2023-08-29 RX ORDER — ALVIMOPAN 12 MG/1
12 CAPSULE ORAL 2 TIMES DAILY
Status: DISCONTINUED | OUTPATIENT
Start: 2023-08-30 | End: 2023-08-30

## 2023-08-29 RX ORDER — IPRATROPIUM BROMIDE AND ALBUTEROL SULFATE 2.5; .5 MG/3ML; MG/3ML
3 SOLUTION RESPIRATORY (INHALATION) ONCE AS NEEDED
Status: DISCONTINUED | OUTPATIENT
Start: 2023-08-29 | End: 2023-08-29 | Stop reason: HOSPADM

## 2023-08-29 RX ORDER — PROMETHAZINE HYDROCHLORIDE 25 MG/1
25 SUPPOSITORY RECTAL ONCE AS NEEDED
Status: DISCONTINUED | OUTPATIENT
Start: 2023-08-29 | End: 2023-08-29 | Stop reason: HOSPADM

## 2023-08-29 RX ORDER — PROMETHAZINE HYDROCHLORIDE 25 MG/1
25 TABLET ORAL ONCE AS NEEDED
Status: DISCONTINUED | OUTPATIENT
Start: 2023-08-29 | End: 2023-08-29 | Stop reason: HOSPADM

## 2023-08-29 RX ORDER — MEPERIDINE HYDROCHLORIDE 25 MG/ML
12.5 INJECTION INTRAMUSCULAR; INTRAVENOUS; SUBCUTANEOUS
Status: DISCONTINUED | OUTPATIENT
Start: 2023-08-29 | End: 2023-08-29 | Stop reason: HOSPADM

## 2023-08-29 RX ORDER — PREGABALIN 50 MG/1
100 CAPSULE ORAL 2 TIMES DAILY
Status: DISCONTINUED | OUTPATIENT
Start: 2023-08-29 | End: 2023-08-31 | Stop reason: HOSPADM

## 2023-08-29 RX ORDER — PRAVASTATIN SODIUM 20 MG
20 TABLET ORAL DAILY
Status: DISCONTINUED | OUTPATIENT
Start: 2023-08-30 | End: 2023-08-31 | Stop reason: HOSPADM

## 2023-08-29 RX ORDER — PREGABALIN 75 MG/1
75 CAPSULE ORAL ONCE
Status: COMPLETED | OUTPATIENT
Start: 2023-08-29 | End: 2023-08-29

## 2023-08-29 RX ORDER — SODIUM CHLORIDE, SODIUM LACTATE, POTASSIUM CHLORIDE, CALCIUM CHLORIDE 600; 310; 30; 20 MG/100ML; MG/100ML; MG/100ML; MG/100ML
9 INJECTION, SOLUTION INTRAVENOUS CONTINUOUS
Status: DISCONTINUED | OUTPATIENT
Start: 2023-08-29 | End: 2023-08-30

## 2023-08-29 RX ADMIN — ACETAMINOPHEN 650 MG: 325 TABLET ORAL at 21:17

## 2023-08-29 RX ADMIN — ROCURONIUM BROMIDE 50 MG: 10 SOLUTION INTRAVENOUS at 14:52

## 2023-08-29 RX ADMIN — LIDOCAINE HYDROCHLORIDE 0.2 ML: 10 INJECTION, SOLUTION EPIDURAL; INFILTRATION; INTRACAUDAL; PERINEURAL at 11:50

## 2023-08-29 RX ADMIN — EPHEDRINE SULFATE 10 MG: 50 INJECTION, SOLUTION INTRAVENOUS at 15:16

## 2023-08-29 RX ADMIN — FAMOTIDINE 20 MG: 20 TABLET ORAL at 12:15

## 2023-08-29 RX ADMIN — EPHEDRINE SULFATE 5 MG: 50 INJECTION, SOLUTION INTRAVENOUS at 15:06

## 2023-08-29 RX ADMIN — FENTANYL CITRATE 50 MCG: 50 INJECTION, SOLUTION INTRAMUSCULAR; INTRAVENOUS at 17:40

## 2023-08-29 RX ADMIN — PREGABALIN 75 MG: 75 CAPSULE ORAL at 12:15

## 2023-08-29 RX ADMIN — FENTANYL CITRATE 50 MCG: 50 INJECTION, SOLUTION INTRAMUSCULAR; INTRAVENOUS at 16:39

## 2023-08-29 RX ADMIN — HEPARIN SODIUM 5000 UNITS: 5000 INJECTION INTRAVENOUS; SUBCUTANEOUS at 12:15

## 2023-08-29 RX ADMIN — Medication 100 MCG: at 15:16

## 2023-08-29 RX ADMIN — SODIUM CHLORIDE, POTASSIUM CHLORIDE, SODIUM LACTATE AND CALCIUM CHLORIDE 9 ML/HR: 600; 310; 30; 20 INJECTION, SOLUTION INTRAVENOUS at 11:50

## 2023-08-29 RX ADMIN — PROPOFOL 15 MCG/KG/MIN: 10 INJECTION, EMULSION INTRAVENOUS at 15:20

## 2023-08-29 RX ADMIN — SODIUM CHLORIDE, POTASSIUM CHLORIDE, SODIUM LACTATE AND CALCIUM CHLORIDE: 600; 310; 30; 20 INJECTION, SOLUTION INTRAVENOUS at 16:22

## 2023-08-29 RX ADMIN — Medication 100 MCG: at 15:05

## 2023-08-29 RX ADMIN — PREGABALIN 100 MG: 50 CAPSULE ORAL at 21:17

## 2023-08-29 RX ADMIN — BUPIVACAINE HYDROCHLORIDE 60 ML: 2.5 INJECTION, SOLUTION EPIDURAL; INFILTRATION; INTRACAUDAL; PERINEURAL at 15:05

## 2023-08-29 RX ADMIN — ALVIMOPAN 12 MG: 12 CAPSULE ORAL at 12:15

## 2023-08-29 RX ADMIN — SODIUM CHLORIDE, POTASSIUM CHLORIDE, SODIUM LACTATE AND CALCIUM CHLORIDE: 600; 310; 30; 20 INJECTION, SOLUTION INTRAVENOUS at 15:05

## 2023-08-29 RX ADMIN — PROPOFOL 150 MG: 10 INJECTION, EMULSION INTRAVENOUS at 14:52

## 2023-08-29 RX ADMIN — ERTAPENEM 1000 MG: 1 INJECTION INTRAMUSCULAR; INTRAVENOUS at 15:05

## 2023-08-29 RX ADMIN — LIDOCAINE HYDROCHLORIDE 40 MG: 10 INJECTION, SOLUTION EPIDURAL; INFILTRATION; INTRACAUDAL; PERINEURAL at 14:52

## 2023-08-29 RX ADMIN — SODIUM CHLORIDE, POTASSIUM CHLORIDE, SODIUM LACTATE AND CALCIUM CHLORIDE 500 ML: 600; 310; 30; 20 INJECTION, SOLUTION INTRAVENOUS at 21:02

## 2023-08-29 RX ADMIN — SUGAMMADEX 200 MG: 100 INJECTION, SOLUTION INTRAVENOUS at 16:13

## 2023-08-29 RX ADMIN — ONDANSETRON 4 MG: 2 INJECTION INTRAMUSCULAR; INTRAVENOUS at 16:06

## 2023-08-29 RX ADMIN — POTASSIUM CHLORIDE AND SODIUM CHLORIDE 100 ML/HR: 900; 150 INJECTION, SOLUTION INTRAVENOUS at 22:24

## 2023-08-29 RX ADMIN — DIAZEPAM 5 MG: 5 TABLET ORAL at 21:17

## 2023-08-29 RX ADMIN — Medication 100 MCG: at 15:00

## 2023-08-29 RX ADMIN — EPHEDRINE SULFATE 10 MG: 50 INJECTION, SOLUTION INTRAVENOUS at 14:55

## 2023-08-29 RX ADMIN — OLANZAPINE 20 MG: 5 TABLET, ORALLY DISINTEGRATING ORAL at 21:17

## 2023-08-29 RX ADMIN — DEXAMETHASONE SODIUM PHOSPHATE 8 MG: 4 INJECTION, SOLUTION INTRAMUSCULAR; INTRAVENOUS at 15:10

## 2023-08-29 RX ADMIN — DEXAMETHASONE SODIUM PHOSPHATE 4 MG: 10 INJECTION, SOLUTION INTRAMUSCULAR; INTRAVENOUS at 15:05

## 2023-08-29 RX ADMIN — FENTANYL CITRATE 50 MCG: 50 INJECTION, SOLUTION INTRAMUSCULAR; INTRAVENOUS at 17:34

## 2023-08-29 RX ADMIN — CYCLOSPORINE 1 DROP: 0.5 EMULSION OPHTHALMIC at 21:18

## 2023-08-29 RX ADMIN — GLYCOPYRROLATE 0.1 MG: 0.4 INJECTION INTRAMUSCULAR; INTRAVENOUS at 15:24

## 2023-08-29 RX ADMIN — FENTANYL CITRATE 50 MCG: 50 INJECTION, SOLUTION INTRAMUSCULAR; INTRAVENOUS at 14:52

## 2023-08-29 RX ADMIN — EPHEDRINE SULFATE 5 MG: 50 INJECTION, SOLUTION INTRAVENOUS at 15:02

## 2023-08-29 RX ADMIN — ZOLPIDEM TARTRATE 10 MG: 5 TABLET ORAL at 21:17

## 2023-08-29 NOTE — ANESTHESIA PREPROCEDURE EVALUATION
Anesthesia Evaluation     Patient summary reviewed and Nursing notes reviewed                Airway   Mallampati: II  TM distance: >3 FB  Neck ROM: full  No difficulty expected  Dental - normal exam     Pulmonary - negative pulmonary ROS and normal exam   Cardiovascular - normal exam    (+) hypertension, hyperlipidemia      Neuro/Psych- negative ROS  GI/Hepatic/Renal/Endo    (+) thyroid problem hypothyroidism    Musculoskeletal (-) negative ROS    Abdominal  - normal exam    Bowel sounds: normal.   Substance History - negative use     OB/GYN negative ob/gyn ROS         Other                        Anesthesia Plan    ASA 2     general     (TAP blocks)  intravenous induction     Anesthetic plan, risks, benefits, and alternatives have been provided, discussed and informed consent has been obtained with: patient.    Plan discussed with CRNA.    CODE STATUS:

## 2023-08-29 NOTE — ANESTHESIA PROCEDURE NOTES
Peripheral Block    Pre-sedation assessment completed: 8/29/2023 3:05 PM    Patient reassessed immediately prior to procedure    Patient location during procedure: OR  Start time: 8/29/2023 3:05 PM  Reason for block: at surgeon's request and post-op pain management  Performed by  CRNA/CAA: Aster Koroma CRNASRNA: Dena Mendoza SRNA  Preanesthetic Checklist  Completed: patient identified, IV checked, site marked, risks and benefits discussed, surgical consent, monitors and equipment checked, pre-op evaluation and timeout performed  Prep:  Pt Position: supine  Sterile barriers:cap, gloves, mask and washed/disinfected hands  Prep: ChloraPrep  Patient monitoring: blood pressure monitoring, continuous pulse oximetry and EKG  Procedure    Sedation: yes  Performed under: general  Guidance:ultrasound guided  Images:still images obtained, printed/placed on chart    Laterality:Bilateral  Block Type:TAP  Injection Technique:single-shot  Needle Type:short-bevel and echogenic  Needle Gauge:20 G  Resistance on Injection: none    Medications Used: bupivacaine PF (MARCAINE) 0.25 % injection - Injection   60 mL - 8/29/2023 3:05:00 PM  dexamethasone sodium phosphate injection - Injection   4 mg - 8/29/2023 3:05:00 PM      Medications  Comment:Block Injection:  LA dose divided between Right and Left block        Post Assessment  Injection Assessment: negative aspiration for heme, incremental injection and no paresthesia on injection  Patient Tolerance:comfortable throughout block  Complications:no  Additional Notes    Subcostal TAPs    A high-frequency linear transducer, with sterile cover, was placed sub-xiphoid to identify [place], right and left Rectus Abdominus Muscles (FRAN). The transducer was moved either right or left subcostally to identify the FRAN and the Transverse Abdominus Muscle (PAIZ). The insertion site was prepped in sterile fashion and then localized with 2-5 ml of 1% Lidocaine. Using ultrasound-guidance, a  "20-gauge B-Dee 4\" Ultraplex 360 non-stimulating echogenic needle was advanced in plane, from medial to lateral, until the tip of the needle was in the fascial plane between the FRAN and PAIZ. 1-3ml of preservative free normal saline was used to hydro-dissect the fascial planes. After the fascial plane was verified, the local anesthetic (LA) was injected. The procedure was repeated on the opposite side for bilateral coverage. Aspiration every 5 ml to prevent intravascular injection. Injection was completed with negative aspiration of blood and negative intravascular injection. Injection pressures were normal with minimal resistance. The subcostal approach to the TAP nerve block ideally anesthetizes the intercostal nerves T6-T9.     Mid-Axillary/Lateral TAPs    A high-frequency linear transducer, with sterile cover, was placed in the midaxillary line between the ASIS and costal margin. The External Oblique Muscle (EOM), Internal Oblique Muscle (IOM), Transverse Abdominus Muscle (PAIZ), and Peritoneum were identified. The insertion site was prepped in sterile fashion and then localized with 2-5 ml of 1% Lidocaine. Using ultrasound-guidance, a 20-gauge B-Dee 4\" Ultraplex 360 non-stimulating echogenic needle was advanced in plane, from medial to lateral, until the tip of the needle was in the fascial plane between the IOM and PAIZ. 1-3ml of preservative free normal saline was used to hydro-dissect the fascial planes. After the fascial plane was verified, the local anesthetic (LA) was injected. The procedure was repeated on the opposite side for bilateral coverage. Aspiration every 5 ml to prevent intravascular injection. Injection was completed with negative aspiration of blood and negative intravascular injection. Injection pressures were normal with minimal resistance. Midaxillary TAPs should reach intercostal nerves T10- T11 and the subcostal nerve T12.            "

## 2023-08-29 NOTE — ANESTHESIA POSTPROCEDURE EVALUATION
Patient: Memo Steven    Procedure Summary       Date: 08/29/23 [facility]  [facility]    Anesthesia Start: 1447 Anesthesia Stop: 1640    Procedure: ILEOCOLIC RESECTION  LAPAROSCOPIC (Abdomen) Diagnosis:     Surgeons: Guido Etienne MD Provider: Helder Berman MD    Anesthesia Type: general ASA Status: 2            Anesthesia Type: general    Vitals  Vitals Value Taken Time   /81 08/29/23 1639   Temp 97 øF (36.1 øC) 08/29/23 1639   Pulse 80 08/29/23 1639   Resp 14 08/29/23 1639   SpO2 93 % 08/29/23 1639           Post Anesthesia Care and Evaluation    Patient location during evaluation: PACU  Patient participation: complete - patient participated  Level of consciousness: sleepy but conscious  Pain score: 0  Pain management: adequate    Airway patency: patent  Anesthetic complications: No anesthetic complications  PONV Status: none  Cardiovascular status: hemodynamically stable and acceptable  Respiratory status: nonlabored ventilation, acceptable, nasal cannula and oral airway  Hydration status: acceptable    Comments: Pt arrived to PACU with no issues during transport. Pt placed directly to monitors. Vital signs are within parameters. Pt maintaining ventilation spontaneously. No changes to dental. Report given to PACU and all question and concerns were addressed as well as the plan of care.

## 2023-08-29 NOTE — OP NOTE
[facility] ([facility]       LAPAROSCOPIC  ILEOCOLIC RESECTION      Procedure Note    Memo Steven  8/29/2023    Pre-op Diagnosis: Ileocolic neoplasia.    Post-op Diagnosis:   Same    Anesthesia: General with Block    Staff:   Circulator: Sandra Taylor, DEE DEE; Cindy Frederick RN  Scrub Person: Samantha Damico RN; Beth Izquierdo; Yuliya Servin RN  Assistant: Lori Stiles RNFA    Assistant: Lori Stiles RNFA was responsible for performing the following activities: Retraction, Suction, Suturing, Closing, and Held/Positioned Camera and their skilled assistance was necessary for the success of this case.    This procedure was not performed to treat colon cancer through resection       Estimated Blood Loss: minimal    Specimens: Ileocolic intestine        Drains: None    Findings: As above    Complications: None evident    The procedure was reviewed in the preoperative area.    We advanced to the operating room with antibiotic and DVT prophylaxis.    A block was placed by anesthesia, the abdomen was prepped and draped, Bravo catheter was in place.  Timeout protocol was utilized.    An epigastric incision permitted introduction of GelPort    Laparoscopy demonstrated no evidence of hepatobiliary pathology, the sigmoid appeared healthy, the small bowel and colon appeared healthy.    The proximal hepatic flexure was mobilized, the entire hepatic flexure was mobilized.    The ileocolic region was mobilized in Trendelenburg tilting to the left with identification the right ureter, this was mobilized up to the duodenum.    The right colon was delivered extracorporeally.    Ileocolic resection was then performed.  The distal ileum was circumferentially cleared and divided with EARL.  The mid right colon was circumferentially cleared and divided with EARL.  The mesentery was divided with suture ligation of the ileocolic artery.    There was good blood supply the proximal and  distal margin regions on examination with Doppler.    The antimesenteric corners were incised permitting introduction of EARL, a common lumen was established with a EARL 75 blue thickness stapler, there is good hemostasis.    The remaining anastomotic closure was complete with TA 60, the more distal tissue was excised, the confluence of the anastomosis was reinforced with interrupted 3-0 Vicryl.    The small cuff of tissue distal to the 60 staple line was reinforced with figure-of-eight closure using 3-0 Vicryl.    There was excellent hemostasis, the anastomosis was returned to the peritoneal cavity, the fascia was reapproximated with internal retention of 0 Vicryl and #1 single stranded PDS    The incision was irrigated and subcuticular closure was performed.    The final counts were announced as correct.      Guido Etienne MD     Date: 8/29/2023  Time: 16:29 EDT

## 2023-08-29 NOTE — ANESTHESIA PROCEDURE NOTES
Airway  Urgency: elective    Date/Time: 8/29/2023 2:54 PM  Airway not difficult    General Information and Staff    Patient location during procedure: OR    Indications and Patient Condition  Indications for airway management: airway protection    Preoxygenated: yes  MILS maintained throughout  Mask difficulty assessment: 1 - vent by mask    Final Airway Details  Final airway type: endotracheal airway      Successful airway: ETT  Cuffed: yes   Successful intubation technique: direct laryngoscopy  Endotracheal tube insertion site: oral  Blade: Obed  Blade size: 3  ETT size (mm): 7.5  Cormack-Lehane Classification: grade I - full view of glottis  Placement verified by: chest auscultation and capnometry   Cuff volume (mL): 7  Measured from: gums  ETT/EBT to gums (cm): 22  Number of attempts at approach: 1  Assessment: lips, teeth, and gum same as pre-op and atraumatic intubation

## 2023-08-29 NOTE — INTERVAL H&P NOTE
[facility] Pre-op    Full history and physical note from office is attached.    VS: /94  HR 72  RR 16  T 97.6  Sat 90%RA    LAB Results:  Lab Results   Component Value Date    WBC 7.55 08/18/2023    HGB 15.6 08/18/2023    HCT 46.3 08/18/2023    MCV 96.5 08/18/2023     08/18/2023    GLUCOSE 112 (H) 08/18/2023    BUN 13 08/18/2023    CREATININE 0.76 08/18/2023     08/18/2023    K 4.3 08/18/2023     08/18/2023    CO2 28.0 08/18/2023    CALCIUM 9.1 08/18/2023    ALBUMIN 4.6 08/18/2023    AST 18 08/18/2023    ALT 16 08/18/2023    BILITOT 0.5 08/18/2023       Cancer Staging (if applicable)  Cancer Patient: __ yes __no __unknown__N/A; If yes, clinical stage T:__ N:__M:__, stage group or __N/A      Impression: Cecal mass      Plan: ILEOCOLIC RESECTION  LAPAROSCOPIC       Macarena Richardson, MAUDE   8/29/2023   11:48 EDT

## 2023-08-29 NOTE — ANESTHESIA PROCEDURE NOTES
Peripheral Block      Patient reassessed immediately prior to procedure    Patient location during procedure: OR  Reason for block: at surgeon's request and post-op pain management  Preanesthetic Checklist  Completed: patient identified, IV checked, site marked, risks and benefits discussed, surgical consent, monitors and equipment checked, pre-op evaluation and timeout performed  Prep:  Pt Position: supine  Sterile barriers:cap, gloves, mask and washed/disinfected hands  Prep: ChloraPrep  Patient monitoring: blood pressure monitoring, continuous pulse oximetry and EKG  Procedure    Sedation: yes  Performed under: general  Guidance:ultrasound guided  Images:still images obtained, printed/placed on chart    Laterality:Bilateral  Block Type:TAP  Injection Technique:single-shot  Needle Type:short-bevel and echogenic  Needle Gauge:20 G  Resistance on Injection: none          Medications  Comment:Block Injection:  LA dose divided between Right and Left block        Post Assessment  Injection Assessment: negative aspiration for heme, incremental injection and no paresthesia on injection  Patient Tolerance:comfortable throughout block  Complications:no

## 2023-08-30 PROBLEM — G89.18 ACUTE POSTOPERATIVE PAIN: Status: ACTIVE | Noted: 2023-08-30

## 2023-08-30 LAB
ANION GAP SERPL CALCULATED.3IONS-SCNC: 5 MMOL/L (ref 5–15)
BASOPHILS # BLD AUTO: 0 10*3/MM3 (ref 0–0.2)
BASOPHILS # BLD AUTO: 0.01 10*3/MM3 (ref 0–0.2)
BASOPHILS NFR BLD AUTO: 0 % (ref 0–1.5)
BASOPHILS NFR BLD AUTO: 0.1 % (ref 0–1.5)
BUN SERPL-MCNC: 15 MG/DL (ref 8–23)
BUN/CREAT SERPL: 21.1 (ref 7–25)
CALCIUM SPEC-SCNC: 7.6 MG/DL (ref 8.6–10.5)
CHLORIDE SERPL-SCNC: 107 MMOL/L (ref 98–107)
CO2 SERPL-SCNC: 27 MMOL/L (ref 22–29)
CREAT SERPL-MCNC: 0.71 MG/DL (ref 0.76–1.27)
DEPRECATED RDW RBC AUTO: 45 FL (ref 37–54)
DEPRECATED RDW RBC AUTO: 45.4 FL (ref 37–54)
EGFRCR SERPLBLD CKD-EPI 2021: 95.1 ML/MIN/1.73
EOSINOPHIL # BLD AUTO: 0 10*3/MM3 (ref 0–0.4)
EOSINOPHIL # BLD AUTO: 0 10*3/MM3 (ref 0–0.4)
EOSINOPHIL NFR BLD AUTO: 0 % (ref 0.3–6.2)
EOSINOPHIL NFR BLD AUTO: 0 % (ref 0.3–6.2)
ERYTHROCYTE [DISTWIDTH] IN BLOOD BY AUTOMATED COUNT: 12.8 % (ref 12.3–15.4)
ERYTHROCYTE [DISTWIDTH] IN BLOOD BY AUTOMATED COUNT: 12.9 % (ref 12.3–15.4)
GLUCOSE SERPL-MCNC: 144 MG/DL (ref 65–99)
HCT VFR BLD AUTO: 29.6 % (ref 37.5–51)
HCT VFR BLD AUTO: 32.6 % (ref 37.5–51)
HGB BLD-MCNC: 10 G/DL (ref 13–17.7)
HGB BLD-MCNC: 10.8 G/DL (ref 13–17.7)
IMM GRANULOCYTES # BLD AUTO: 0.03 10*3/MM3 (ref 0–0.05)
IMM GRANULOCYTES # BLD AUTO: 0.05 10*3/MM3 (ref 0–0.05)
IMM GRANULOCYTES NFR BLD AUTO: 0.4 % (ref 0–0.5)
IMM GRANULOCYTES NFR BLD AUTO: 0.5 % (ref 0–0.5)
LYMPHOCYTES # BLD AUTO: 0.66 10*3/MM3 (ref 0.7–3.1)
LYMPHOCYTES # BLD AUTO: 1.49 10*3/MM3 (ref 0.7–3.1)
LYMPHOCYTES NFR BLD AUTO: 13.7 % (ref 19.6–45.3)
LYMPHOCYTES NFR BLD AUTO: 8.9 % (ref 19.6–45.3)
MCH RBC QN AUTO: 32 PG (ref 26.6–33)
MCH RBC QN AUTO: 32.8 PG (ref 26.6–33)
MCHC RBC AUTO-ENTMCNC: 33.1 G/DL (ref 31.5–35.7)
MCHC RBC AUTO-ENTMCNC: 33.8 G/DL (ref 31.5–35.7)
MCV RBC AUTO: 96.4 FL (ref 79–97)
MCV RBC AUTO: 97 FL (ref 79–97)
MONOCYTES # BLD AUTO: 0.44 10*3/MM3 (ref 0.1–0.9)
MONOCYTES # BLD AUTO: 0.99 10*3/MM3 (ref 0.1–0.9)
MONOCYTES NFR BLD AUTO: 5.9 % (ref 5–12)
MONOCYTES NFR BLD AUTO: 9.1 % (ref 5–12)
NEUTROPHILS NFR BLD AUTO: 6.27 10*3/MM3 (ref 1.7–7)
NEUTROPHILS NFR BLD AUTO: 76.6 % (ref 42.7–76)
NEUTROPHILS NFR BLD AUTO: 8.36 10*3/MM3 (ref 1.7–7)
NEUTROPHILS NFR BLD AUTO: 84.8 % (ref 42.7–76)
NRBC BLD AUTO-RTO: 0 /100 WBC (ref 0–0.2)
NRBC BLD AUTO-RTO: 0 /100 WBC (ref 0–0.2)
PLATELET # BLD AUTO: 143 10*3/MM3 (ref 140–450)
PLATELET # BLD AUTO: 143 10*3/MM3 (ref 140–450)
PMV BLD AUTO: 11.9 FL (ref 6–12)
PMV BLD AUTO: 12.4 FL (ref 6–12)
POTASSIUM SERPL-SCNC: 4.3 MMOL/L (ref 3.5–5.2)
RBC # BLD AUTO: 3.05 10*6/MM3 (ref 4.14–5.8)
RBC # BLD AUTO: 3.38 10*6/MM3 (ref 4.14–5.8)
SODIUM SERPL-SCNC: 139 MMOL/L (ref 136–145)
WBC NRBC COR # BLD: 10.9 10*3/MM3 (ref 3.4–10.8)
WBC NRBC COR # BLD: 7.4 10*3/MM3 (ref 3.4–10.8)

## 2023-08-30 PROCEDURE — 25010000002 HEPARIN (PORCINE) PER 1000 UNITS: Performed by: COLON & RECTAL SURGERY

## 2023-08-30 PROCEDURE — 25010000002 SODIUM CHLORIDE 0.9 % WITH KCL 20 MEQ 20-0.9 MEQ/L-% SOLUTION: Performed by: COLON & RECTAL SURGERY

## 2023-08-30 PROCEDURE — 85025 COMPLETE CBC W/AUTO DIFF WBC: CPT | Performed by: COLON & RECTAL SURGERY

## 2023-08-30 PROCEDURE — 80048 BASIC METABOLIC PNL TOTAL CA: CPT | Performed by: COLON & RECTAL SURGERY

## 2023-08-30 PROCEDURE — 97161 PT EVAL LOW COMPLEX 20 MIN: CPT

## 2023-08-30 PROCEDURE — 97530 THERAPEUTIC ACTIVITIES: CPT

## 2023-08-30 RX ORDER — TAMSULOSIN HYDROCHLORIDE 0.4 MG/1
0.4 CAPSULE ORAL DAILY
Status: DISCONTINUED | OUTPATIENT
Start: 2023-08-30 | End: 2023-08-31 | Stop reason: HOSPADM

## 2023-08-30 RX ADMIN — HEPARIN SODIUM 5000 UNITS: 5000 INJECTION INTRAVENOUS; SUBCUTANEOUS at 13:10

## 2023-08-30 RX ADMIN — HYDROCODONE BITARTRATE AND ACETAMINOPHEN 1 TABLET: 7.5; 325 TABLET ORAL at 06:07

## 2023-08-30 RX ADMIN — ACETAMINOPHEN 650 MG: 325 TABLET ORAL at 12:50

## 2023-08-30 RX ADMIN — PRAVASTATIN SODIUM 20 MG: 20 TABLET ORAL at 06:01

## 2023-08-30 RX ADMIN — LEVOTHYROXINE SODIUM 50 MCG: 0.05 TABLET ORAL at 06:01

## 2023-08-30 RX ADMIN — PREGABALIN 100 MG: 50 CAPSULE ORAL at 21:03

## 2023-08-30 RX ADMIN — PREGABALIN 100 MG: 50 CAPSULE ORAL at 08:35

## 2023-08-30 RX ADMIN — ZOLPIDEM TARTRATE 10 MG: 5 TABLET ORAL at 21:11

## 2023-08-30 RX ADMIN — ALVIMOPAN 12 MG: 12 CAPSULE ORAL at 08:35

## 2023-08-30 RX ADMIN — HEPARIN SODIUM 5000 UNITS: 5000 INJECTION INTRAVENOUS; SUBCUTANEOUS at 21:03

## 2023-08-30 RX ADMIN — ESCITALOPRAM OXALATE 40 MG: 20 TABLET ORAL at 06:01

## 2023-08-30 RX ADMIN — ACETAMINOPHEN 650 MG: 325 TABLET ORAL at 21:03

## 2023-08-30 RX ADMIN — DIAZEPAM 5 MG: 5 TABLET ORAL at 21:11

## 2023-08-30 RX ADMIN — POTASSIUM CHLORIDE AND SODIUM CHLORIDE 100 ML/HR: 900; 150 INJECTION, SOLUTION INTRAVENOUS at 08:54

## 2023-08-30 RX ADMIN — TAMSULOSIN HYDROCHLORIDE 0.4 MG: 0.4 CAPSULE ORAL at 13:10

## 2023-08-30 RX ADMIN — HEPARIN SODIUM 5000 UNITS: 5000 INJECTION INTRAVENOUS; SUBCUTANEOUS at 06:01

## 2023-08-30 RX ADMIN — HYDROCODONE BITARTRATE AND ACETAMINOPHEN 1 TABLET: 7.5; 325 TABLET ORAL at 21:11

## 2023-08-30 RX ADMIN — OLANZAPINE 20 MG: 5 TABLET, ORALLY DISINTEGRATING ORAL at 21:03

## 2023-08-30 NOTE — PLAN OF CARE
Goal Outcome Evaluation:  Plan of Care Reviewed With: patient        Progress: no change  Outcome Evaluation: Pt presents with decreased independence with mobility and generalized weakness. Pt ambulated 200ft with CGA and RW for support. Pt required increased cues to improve sequencing of AD. Recommend continued skilled IP PT interventions. Recommend D/C home with assist.      Anticipated Discharge Disposition (PT): home with assist

## 2023-08-30 NOTE — NURSING NOTE
While at bedside still admitting patient, patient states that he feels the need to have a BM. Primary RN and tech ambulated patient to restroom with minimal assistance, pt tolerated well and while on toilet, primary RN went to assist another patient and tech stayed with patient at bedside to assist back to bed from commode. While in another room, tech calls and states that patient had a fall in bathroom, upon assessment patient stated he became dizzy when standing up from toilet and then his knees became weak and his wife and the tech assisted him to his knees. He had an abrasion to right knee with top layer of skin removed. Patient denied any other injuries and no other issues noted. Dr Etienne, at the direction of charge RN, was notified of events, including patient being hypotensive per VS charting and that patient was back to baseline after being in bed for a few minutes as well as patient had a very large BM that appeared to be mostly blood. MD stated to give 500 ml LR bolus and that H&H would be checked in AM and that he expected patient to have several bloody BM overnight. HS also aware of incident.

## 2023-08-30 NOTE — PLAN OF CARE
Goal Outcome Evaluation:      A&Ox4. RA. NSR. Ambulating mult times with SB assist. Pain well controlled. Voiding well since wayne removal. 2 bloody BM's. Tolerating diet. Safety maintained.

## 2023-08-30 NOTE — PLAN OF CARE
Problem: Adult Inpatient Plan of Care  Goal: Plan of Care Review  Outcome: Ongoing, Progressing  Goal: Patient-Specific Goal (Individualized)  Outcome: Ongoing, Progressing  Goal: Absence of Hospital-Acquired Illness or Injury  Outcome: Ongoing, Progressing  Intervention: Identify and Manage Fall Risk  Recent Flowsheet Documentation  Taken 8/29/2023 2036 by Andres Esquivel RN  Safety Promotion/Fall Prevention:   activity supervised   assistive device/personal items within reach   clutter free environment maintained   fall prevention program maintained   gait belt   lighting adjusted   mobility aid in reach   nonskid shoes/slippers when out of bed   room organization consistent   safety round/check completed   toileting scheduled  Taken 8/29/2023 2000 by Andres Esquivel RN  Safety Promotion/Fall Prevention:   activity supervised   assistive device/personal items within reach   clutter free environment maintained   lighting adjusted   nonskid shoes/slippers when out of bed   room organization consistent   safety round/check completed   toileting scheduled  Intervention: Prevent Skin Injury  Recent Flowsheet Documentation  Taken 8/29/2023 2036 by Andres Esquivel RN  Body Position: position changed independently  Skin Protection:   adhesive use limited   tubing/devices free from skin contact  Taken 8/29/2023 2000 by Andres Esquivel RN  Body Position: position changed independently  Skin Protection:   adhesive use limited   tubing/devices free from skin contact  Intervention: Prevent and Manage VTE (Venous Thromboembolism) Risk  Recent Flowsheet Documentation  Taken 8/29/2023 2036 by Andres Esquivel RN  Activity Management: activity encouraged  Range of Motion: active ROM (range of motion) encouraged  Taken 8/29/2023 2004 by Andres Esquivel RN  Activity Management: (stretcher to bed) ambulated in room  VTE Prevention/Management:   bilateral   sequential compression  devices on  Taken 8/29/2023 2000 by Andres Esquivel RN  Range of Motion: active ROM (range of motion) encouraged  Intervention: Prevent Infection  Recent Flowsheet Documentation  Taken 8/29/2023 2036 by Andres Esquivel RN  Infection Prevention:   environmental surveillance performed   equipment surfaces disinfected   hand hygiene promoted   rest/sleep promoted   single patient room provided  Taken 8/29/2023 2000 by Andres Esquivel RN  Infection Prevention:   environmental surveillance performed   equipment surfaces disinfected   hand hygiene promoted   rest/sleep promoted   single patient room provided  Goal: Optimal Comfort and Wellbeing  Outcome: Ongoing, Progressing  Intervention: Monitor Pain and Promote Comfort  Recent Flowsheet Documentation  Taken 8/29/2023 2036 by Andres Esquivel RN  Pain Management Interventions:   pillow support provided   position adjusted   quiet environment facilitated   see MAR   medication offered but refused  Taken 8/29/2023 2000 by Andrse Esquivel RN  Pain Management Interventions:   pillow support provided   position adjusted   medication offered but refused   quiet environment facilitated   see MAR  Intervention: Provide Person-Centered Care  Recent Flowsheet Documentation  Taken 8/29/2023 2036 by Andres Esquivel RN  Trust Relationship/Rapport:   care explained   questions answered   questions encouraged   thoughts/feelings acknowledged  Taken 8/29/2023 2000 by Andres Esquivel RN  Trust Relationship/Rapport:   care explained   questions answered   questions encouraged   thoughts/feelings acknowledged  Goal: Readiness for Transition of Care  Outcome: Ongoing, Progressing  Intervention: Mutually Develop Transition Plan  Recent Flowsheet Documentation  Taken 8/29/2023 2250 by Andres Esquivel RN  Transportation Anticipated: family or friend will provide  Patient/Family Anticipated Services at Transition:  none  Patient/Family Anticipates Transition to: home     Problem: Fall Injury Risk  Goal: Absence of Fall and Fall-Related Injury  Outcome: Ongoing, Progressing  Intervention: Identify and Manage Contributors  Recent Flowsheet Documentation  Taken 8/29/2023 2036 by Andres Esquivel RN  Medication Review/Management: medications reviewed  Taken 8/29/2023 2000 by Andres Esquivel RN  Medication Review/Management: medications reviewed  Intervention: Promote Injury-Free Environment  Recent Flowsheet Documentation  Taken 8/29/2023 2036 by Andres Esquivel RN  Safety Promotion/Fall Prevention:   activity supervised   assistive device/personal items within reach   clutter free environment maintained   fall prevention program maintained   gait belt   lighting adjusted   mobility aid in reach   nonskid shoes/slippers when out of bed   room organization consistent   safety round/check completed   toileting scheduled  Taken 8/29/2023 2000 by Andres Esquivel RN  Safety Promotion/Fall Prevention:   activity supervised   assistive device/personal items within reach   clutter free environment maintained   lighting adjusted   nonskid shoes/slippers when out of bed   room organization consistent   safety round/check completed   toileting scheduled   Goal Outcome Evaluation:            VSS, O2 at 2 L/min NC, patient has slept entire shift after nighttime meds, no issues stated or observed.

## 2023-08-30 NOTE — CASE MANAGEMENT/SOCIAL WORK
Discharge Planning Assessment  [facility]     Patient Name: Memo Steven  MRN: [id number]  Today's Date: 8/30/2023    Admit Date: 8/29/2023    Plan: home   Discharge Needs Assessment       Row Name 08/30/23 0840       Living Environment    People in Home spouse    Name(s) of People in Home Emily    Current Living Arrangements home    Primary Care Provided by self;spouse/significant other       Transition Planning    Patient/Family Anticipates Transition to home with family       Discharge Needs Assessment    Readmission Within the Last 30 Days no previous admission in last 30 days    Equipment Currently Used at Home walker, rolling    Concerns to be Addressed discharge planning;basic needs                   Discharge Plan       Row Name 08/30/23 0840       Plan    Plan home    Patient/Family in Agreement with Plan yes    Plan Comments I met with this patient and his wife at the bedside. They live in [facility]. He is independent with self care, but needs assistance with all other activities of daily living. He is independent with mobility with the use of a rolling walker. PT has been consulted.  He anticipates returning home after this hospitalization and his wife can transport. Case management will continue to follow his plan of care and assist with any discharge planning needs.    Final Discharge Disposition Code 01 - home or self-care                  Continued Care and Services - Admitted Since 8/29/2023    Coordination has not been started for this encounter.       Expected Discharge Date and Time       Expected Discharge Date Expected Discharge Time    Sep 6, 2023            Demographic Summary       Row Name 08/30/23 0839       General Information    General Information Comments I confirmed that Ramin Forman is Mr Steven's PCP and he has [employer] Medicare insurance                   Functional Status       Row Name 08/30/23 0839       Functional Status, IADL    Medications completely dependent    Meal  Preparation completely dependent    Housekeeping completely dependent    Laundry completely dependent    Shopping completely dependent                   Psychosocial    No documentation.                  Abuse/Neglect    No documentation.                  Legal    No documentation.                  Substance Abuse    No documentation.                  Patient Forms    No documentation.                     Jennifer Willams RN

## 2023-08-30 NOTE — PROGRESS NOTES
Doing well    1 bloody bowel movement last night prompting phone call with 500 cc bolus and no subsequent difficulty.    Supportive wife at the bedside    Subsequent bowel movements have been without significant blood.    Tolerating diet well    Incision/exam looks good    Heart rate in the 90s, not tachycardic    Hematocrit this morning 32    Hep-Lock IV  Solid diet  Trend hematocrit  Discharge instructions reviewed  Likely home tomorrow morning... Unless difficulties  There could be more pain tomorrow when block wears off.

## 2023-08-30 NOTE — H&P
Admission [facility]     Patient Name: Memo Steven  MRN: [id number]  : 1945  DOS: 2023    Attending: Guido Etienne MD    Primary Care Provider: Ramin Forman MD      Patient Care Team:  Ramin Forman MD as PCP - General (Family Medicine)    Chief complaint: Cecal neoplasia/ileocolic neoplasia    Subjective   Patient is a pleasant 77 y.o. male presented for scheduled surgery by Dr. Etienne.    I saw patient preoperatively, discussed with patient and his wife patient's past medical history, expected surgery, plans for after surgery.    Subsequent notes indicate he later underwent laparoscopic ileocolic resection, surgery was done by Dr. Etienne under general anesthesia and a block, was tolerated well, he was admitted for further management.    Allergies   Allergen Reactions    Aspirin GI Intolerance    Nsaids GI Intolerance         Medications Prior to Admission   Medication Sig Dispense Refill Last Dose    ALPRAZolam (XANAX) 1 MG tablet Take 1 tablet by mouth 3 (Three) Times a Day As Needed.  5 2023 at 2100    amLODIPine (NORVASC) 2.5 MG tablet Take 1 tablet by mouth Daily.  1 2023    cetirizine (zyrTEC) 10 MG tablet Take 1 tablet by mouth Daily As Needed.  3 2023    Cholecalciferol (Vitamin D3) 50 MCG (2000 UT) capsule Take 1 capsule by mouth Daily.   2023    escitalopram (LEXAPRO) 20 MG tablet Take 2 tablets by mouth Every Morning.  5 2023    fenofibrate 160 MG tablet   1 2023    levothyroxine sodium (TIROSINT) 50 MCG capsule Take 1 capsule by mouth Every Morning.  1 2023 at 0400    OLANZapine zydis (zyPREXA) 20 MG disintegrating tablet Place 1 tablet on the tongue Every Night.   2023    polyethylene glycol (MIRALAX) powder Take 17 g by mouth Daily As Needed.  5 2023    pravastatin (PRAVACHOL) 20 MG tablet Take 1 tablet by mouth Every Night.  1 2023 at 0400    pregabalin (LYRICA) 100 MG capsule Take 1 capsule by mouth 2 (Two) Times a Day.   2023     "traMADol (ULTRAM) 50 MG tablet Take 1 tablet by mouth Daily As Needed for Moderate Pain.   8/28/2023    ZETIA 10 MG tablet Take 1 tablet by mouth Every Night.  1 8/28/2023    zolpidem (AMBIEN) 10 MG tablet 1 tablet At Night As Needed.  5 8/28/2023    cycloSPORINE (RESTASIS) 0.05 % ophthalmic emulsion 1 drop 2 (Two) Times a Day.   8/27/2023    LYRICA 100 MG capsule   2           Past Medical History:   Diagnosis Date    Anxiety disorder     Arthritis     Depression     High blood pressure     High cholesterol     High triglycerides     Panic attacks     Thyroid disorder      Past Surgical History:   Procedure Laterality Date    CHOLECYSTECTOMY  2017    COLONOSCOPY      LARYNX SURGERY      NECK SURGERY      disc fusion     Family History   Problem Relation Age of Onset    Prostate cancer Brother     Heart disease Brother      Social History     Tobacco Use    Smoking status: Never    Smokeless tobacco: Never   Vaping Use    Vaping Use: Never used   Substance Use Topics    Alcohol use: No    Drug use: No       Review of Systems  Pertinent items are noted in HPI    Vital Signs  /78 (BP Location: Right arm, Patient Position: Lying)   Pulse 82   Temp 98.2 °F (36.8 °C) (Temporal)   Resp 14   Ht 172.7 cm (68\")   Wt 66.7 kg (147 lb)   SpO2 100%   BMI 22.35 kg/m²     Physical Exam:    General Appearance:    Alert, cooperative, in no acute distress   Head:    Normocephalic, without obvious abnormality, atraumatic   Eyes:            Lids and lashes normal, conjunctivae and sclerae normal, no   icterus, no pallor, corneas clear   Ears:    Ears appear intact with no abnormalities noted   Throat:   No oral lesions, no thrush, oral mucosa moist   Neck:   No adenopathy, supple, trachea midline, no thyromegaly         Lungs:     Clear to auscultation,respirations regular, even and       unlabored. No wheezes or rales.    Heart:    Regular rhythm and normal rate, normal S1 and S2, no murmur    Abdomen:      Soft and " benign when seen preoperatively   Genitalia:    Deferred   Extremities:   Moves all extremities well, no edema, no cyanosis, no              redness   Pulses:   Pulses palpable and equal bilaterally   Skin:   No bleeding, bruising or rash   Neurologic:   Cranial nerves 2 - 12 grossly intact, no gross deficit.           Invalid input(s): NEUTOPHILPCT,  EOSPCT        Invalid input(s): LABALBU, PROT  Lab Results   Component Value Date    HGBA1C 5.50 08/18/2023      Latest Reference Range & Units 08/18/23 12:59   Sodium 136 - 145 mmol/L 140   Potassium 3.5 - 5.2 mmol/L 4.3   Chloride 98 - 107 mmol/L 103   CO2 22.0 - 29.0 mmol/L 28.0   Anion Gap 5.0 - 15.0 mmol/L 9.0   BUN 8 - 23 mg/dL 13   Creatinine 0.76 - 1.27 mg/dL 0.76   BUN/Creatinine Ratio 7.0 - 25.0  17.1   eGFR >60.0 mL/min/1.73 93.2   Glucose 65 - 99 mg/dL 112 (H)   Calcium 8.6 - 10.5 mg/dL 9.1   Alkaline Phosphatase 39 - 117 U/L 58   Total Protein 6.0 - 8.5 g/dL 7.4   Albumin 3.5 - 5.2 g/dL 4.6   Globulin gm/dL 2.8   A/G Ratio g/dL 1.6   AST (SGOT) 1 - 40 U/L 18   ALT (SGPT) 1 - 41 U/L 16   Total Bilirubin 0.0 - 1.2 mg/dL 0.5   (H): Data is abnormally high     Latest Reference Range & Units 08/18/23 12:59   WBC 3.40 - 10.80 10*3/mm3 7.55   RBC 4.14 - 5.80 10*6/mm3 4.80   Hemoglobin 13.0 - 17.7 g/dL 15.6   Hematocrit 37.5 - 51.0 % 46.3   Platelets 140 - 450 10*3/mm3 176   RDW 12.3 - 15.4 % 12.7   MCV 79.0 - 97.0 fL 96.5   MCH 26.6 - 33.0 pg 32.5   MCHC 31.5 - 35.7 g/dL 33.7   MPV 6.0 - 12.0 fL 11.4   RDW-SD 37.0 - 54.0 fl 45.5       Assessment and Plan:       S/P laparoscopy, ileocolic resection    Colon neoplasm    HTN (hypertension)    Anxiety and depression    Hypothyroidism (acquired)      Plan  Postop management to include  1. Ambulation, several times daily  2. Pain control-prns   3. IS-encourage  4. DVT proph- Mechanical, subcutaneous heparin  5. Bowel regimen  6. Resume home medications as appropriate  7. Monitor post-op labs.  8. DC planning   9. Diet,  Clears, advance diet as tolerated.  IVF initially, monitor volume status.    - Hypertension:  Resume home medications as appropriate, formulary substitution when indicated.  Holding parameters.  Prn medications for elevated blood pressure.    -Anxiety and depression: Maintained on home regimen.    -Hypothyroidism: Resume replacement.      Dragon disclaimer:  Part of this encounter note is an electronic transcription/translation of spoken language to printed text. The electronic translation of spoken language may permit erroneous, or at times, nonsensical words or phrases to be inadvertently transcribed; Although I have reviewed the note for such errors, some may still exist.    Altagracia Cage MD  08/29/23  20:05 EDT

## 2023-08-30 NOTE — NURSING NOTE
Primary RN notified charge RN that patient refused wayne catheter removal. Charge RN stated to inform dayshift RN.

## 2023-08-30 NOTE — THERAPY EVALUATION
Patient Name: Memo Steven  : 1945    MRN: [id number]                              Today's Date: 2023       Admit Date: 2023    Visit Dx:     ICD-10-CM ICD-9-CM   1. Cecum mass  K63.89 569.89     Patient Active Problem List   Diagnosis    Colon neoplasm    HTN (hypertension)    Anxiety and depression    Hypothyroidism (acquired)    S/P laparoscopy, ileocolic resection    Acute postoperative pain     Past Medical History:   Diagnosis Date    Anxiety disorder     Arthritis     Depression     High blood pressure     High cholesterol     High triglycerides     Panic attacks     Thyroid disorder      Past Surgical History:   Procedure Laterality Date    CHOLECYSTECTOMY  2017    COLON RESECTION N/A 2023    Procedure: ILEOCOLIC RESECTION  LAPAROSCOPIC;  Surgeon: Guido Etienne MD;  Location: [facility];  Service: General;  Laterality: N/A;    COLONOSCOPY      LARYNX SURGERY      NECK SURGERY      disc fusion      General Information       Row Name 23 1145          Physical Therapy Time and Intention    Document Type evaluation  -AE     Mode of Treatment physical therapy  -AE       Row Name 23 1145          General Information    Patient Profile Reviewed yes  -AE     Prior Level of Function independent:;all household mobility;gait;transfer;bed mobility;ADL's  Assist as needed from spouse  -AE     Existing Precautions/Restrictions fall;other (see comments)  Abd incision  -AE     Barriers to Rehab medically complex  -AE       Row Name 23 1145          Living Environment    People in Home spouse  -AE       Row Name 23 1145          Home Main Entrance    Number of Stairs, Main Entrance one  -AE     Stair Railings, Main Entrance railings on both sides of stairs  -AE       Row Name 23 1145          Stairs Within Home, Primary    Number of Stairs, Within Home, Primary none  -AE     Stair Railings, Within Home, Primary none  -AE       Row Name 23 1145          Cognition     Orientation Status (Cognition) oriented x 4  -AE       Row Name 08/30/23 1145          Safety Issues, Functional Mobility    Safety Issues Affecting Function (Mobility) awareness of need for assistance;insight into deficits/self-awareness;safety precaution awareness;sequencing abilities  -AE     Impairments Affecting Function (Mobility) balance;coordination;endurance/activity tolerance;pain;strength  -AE               User Key  (r) = Recorded By, (t) = Taken By, (c) = Cosigned By      Initials Name Provider Type    AE Nicolás Hays, SUSHIL Physical Therapist                   Mobility       Row Name 08/30/23 1150          Bed Mobility    Bed Mobility supine-sit;sit-supine  -AE     Supine-Sit Haakon (Bed Mobility) minimum assist (75% patient effort);1 person assist;verbal cues  -AE     Sit-Supine Haakon (Bed Mobility) minimum assist (75% patient effort);1 person assist;verbal cues  -AE     Assistive Device (Bed Mobility) bed rails;head of bed elevated  -AE     Comment, (Bed Mobility) VCs for hand placement and sequencing. Pt educated on log rolling technique to reduce abdominal pain. Manual assist required for rolling.  -AE       Row Name 08/30/23 1150          Transfers    Comment, (Transfers) VCs for hand placement and sequencing. Pt required increased cues to improve upright posture. Pt was shaky when standing, reports weakness.  -AE       Row Name 08/30/23 1150          Sit-Stand Transfer    Sit-Stand Haakon (Transfers) contact guard;1 person assist;verbal cues  -AE     Assistive Device (Sit-Stand Transfers) walker, front-wheeled  -AE       Row Name 08/30/23 1150          Gait/Stairs (Locomotion)    Haakon Level (Gait) contact guard;1 person assist;verbal cues  -AE     Assistive Device (Gait) walker, front-wheeled  -AE     Distance in Feet (Gait) 200  -AE     Deviations/Abnormal Patterns (Gait) bilateral deviations;alicia decreased;gait speed decreased;stride length decreased  -AE      Bilateral Gait Deviations forward flexed posture  -AE     Comment, (Gait/Stairs) Pt demo step through gait pattern with slowed alicia, forward flexed posture, and decreased sequencing of AD. Pt is shaky while ambulating d/t weakness but demo good safety awareness. Further distance limited by weakness.  -AE               User Key  (r) = Recorded By, (t) = Taken By, (c) = Cosigned By      Initials Name Provider Type    AE Nicolás Hays PT Physical Therapist                   Obj/Interventions       Row Name 08/30/23 1156          Range of Motion Comprehensive    General Range of Motion bilateral lower extremity ROM WFL  -AE       Row Name 08/30/23 1156          Strength Comprehensive (MMT)    General Manual Muscle Testing (MMT) Assessment lower extremity strength deficits identified  -AE     Comment, General Manual Muscle Testing (MMT) Assessment BLE 4-/5; generalized weakness  -AE       Row Name 08/30/23 1156          Balance    Balance Assessment sitting static balance;sitting dynamic balance;sit to stand dynamic balance;standing static balance;standing dynamic balance  -AE     Static Sitting Balance contact guard  -AE     Dynamic Sitting Balance contact guard  -AE     Position, Sitting Balance unsupported;sitting edge of bed  -AE     Sit to Stand Dynamic Balance contact guard;1-person assist;verbal cues  -AE     Static Standing Balance contact guard;1-person assist  -AE     Dynamic Standing Balance contact guard;1-person assist  -AE     Position/Device Used, Standing Balance supported;walker, front-wheeled  -AE       Row Name 08/30/23 1156          Sensory Assessment (Somatosensory)    Sensory Assessment (Somatosensory) LE sensation intact  -AE               User Key  (r) = Recorded By, (t) = Taken By, (c) = Cosigned By      Initials Name Provider Type    AE Nicolás Hays PT Physical Therapist                   Goals/Plan       Row Name 08/30/23 1334          Bed Mobility Goal 1 (PT)    Activity/Assistive  Device (Bed Mobility Goal 1, PT) sit to supine/supine to sit  -AE     Show Low Level/Cues Needed (Bed Mobility Goal 1, PT) standby assist  -AE     Time Frame (Bed Mobility Goal 1, PT) long term goal (LTG);10 days  -AE     Progress/Outcomes (Bed Mobility Goal 1, PT) new goal  -AE       Row Name 08/30/23 0341          Transfer Goal 1 (PT)    Activity/Assistive Device (Transfer Goal 1, PT) sit-to-stand/stand-to-sit;bed-to-chair/chair-to-bed  -AE     Show Low Level/Cues Needed (Transfer Goal 1, PT) modified independence  -AE     Time Frame (Transfer Goal 1, PT) long term goal (LTG);10 days  -AE     Progress/Outcome (Transfer Goal 1, PT) new goal  -AE       Row Name 08/30/23 6900          Gait Training Goal 1 (PT)    Activity/Assistive Device (Gait Training Goal 1, PT) gait (walking locomotion);assistive device use  -AE     Show Low Level (Gait Training Goal 1, PT) modified independence  -AE     Distance (Gait Training Goal 1, PT) 400ft  -AE     Time Frame (Gait Training Goal 1, PT) long term goal (LTG);10 days  -AE     Progress/Outcome (Gait Training Goal 1, PT) new goal  -AE       Row Name 08/30/23 4071          Stairs Goal 1 (PT)    Activity/Assistive Device (Stairs Goal 1, PT) ascending stairs;descending stairs;step-to-step  -AE     Show Low Level/Cues Needed (Stairs Goal 1, PT) standby assist  -AE     Number of Stairs (Stairs Goal 1, PT) 1  -AE     Time Frame (Stairs Goal 1, PT) long term goal (LTG);10 days  -AE     Progress/Outcome (Stairs Goal 1, PT) new goal  -AE       Row Name 08/30/23 5185          Therapy Assessment/Plan (PT)    Planned Therapy Interventions (PT) balance training;gait training;bed mobility training;home exercise program;patient/family education;postural re-education;ROM (range of motion);strengthening;transfer training;stair training  -AE               User Key  (r) = Recorded By, (t) = Taken By, (c) = Cosigned By      Initials Name Provider Type    AE Nicolás Hays, PT  Physical Therapist                   Clinical Impression       Row Name 08/30/23 1158          Pain    Pretreatment Pain Rating 3/10  -AE     Posttreatment Pain Rating 3/10  -AE     Pain Location incisional  -AE     Pain Location - abdomen  -AE     Pre/Posttreatment Pain Comment tolerated  -AE     Pain Intervention(s) Repositioned;Ambulation/increased activity  -AE       Row Name 08/30/23 1158          Plan of Care Review    Plan of Care Reviewed With patient  -AE     Progress no change  -AE     Outcome Evaluation Pt presents with decreased independence with mobility and generalized weakness. Pt ambulated 200ft with CGA and RW for support. Pt required increased cues to improve sequencing of AD. Recommend continued skilled IP PT interventions. Recommend D/C home with assist.  -AE       Row Name 08/30/23 1158          Therapy Assessment/Plan (PT)    Rehab Potential (PT) good, to achieve stated therapy goals  -AE     Criteria for Skilled Interventions Met (PT) yes  -AE     Therapy Frequency (PT) daily  -AE       Row Name 08/30/23 1158          Vital Signs    Pre Systolic BP Rehab 99  -AE     Pre Treatment Diastolic BP 69  -AE     Pretreatment Heart Rate (beats/min) 84  -AE     Posttreatment Heart Rate (beats/min) 79  -AE     Pre SpO2 (%) 93  -AE     O2 Delivery Pre Treatment room air  -AE     O2 Delivery Intra Treatment room air  -AE     Post SpO2 (%) 93  -AE     O2 Delivery Post Treatment room air  -AE     Pre Patient Position Supine  -AE     Intra Patient Position Standing  -AE     Post Patient Position Supine  -AE       Row Name 08/30/23 1158          Positioning and Restraints    Pre-Treatment Position in bed  -AE     Post Treatment Position bed  -AE     In Bed notified nsg;supine;call light within reach;encouraged to call for assist;exit alarm on;side rails up x2;legs elevated;SCD pump applied  -AE               User Key  (r) = Recorded By, (t) = Taken By, (c) = Cosigned By      Initials Name Provider Type    AE  Nicolás Hays, PT Physical Therapist                   Outcome Measures       Row Name 08/30/23 1336          How much help from another person do you currently need...    Turning from your back to your side while in flat bed without using bedrails? 3  -AE     Moving from lying on back to sitting on the side of a flat bed without bedrails? 3  -AE     Moving to and from a bed to a chair (including a wheelchair)? 3  -AE     Standing up from a chair using your arms (e.g., wheelchair, bedside chair)? 3  -AE     Climbing 3-5 steps with a railing? 2  -AE     To walk in hospital room? 3  -AE     AM-PAC 6 Clicks Score (PT) 17  -AE     Highest level of mobility 5 --> Static standing  -AE       Row Name 08/30/23 3737          Functional Assessment    Outcome Measure Options AM-PAC 6 Clicks Basic Mobility (PT)  -AE               User Key  (r) = Recorded By, (t) = Taken By, (c) = Cosigned By      Initials Name Provider Type    AE Nicolás Hays PT Physical Therapist                                 Physical Therapy Education       Title: PT OT SLP Therapies (In Progress)       Topic: Physical Therapy (In Progress)       Point: Mobility training (Done)       Learning Progress Summary             Patient Acceptance, E, VU by AE at 8/30/2023 1117                         Point: Home exercise program (Not Started)       Learner Progress:  Not documented in this visit.              Point: Body mechanics (Done)       Learning Progress Summary             Patient Acceptance, E, VU by AE at 8/30/2023 1117                         Point: Precautions (Done)       Learning Progress Summary             Patient Acceptance, E, VU by AE at 8/30/2023 1117                                         User Key       Initials Effective Dates Name Provider Type Discipline    AE 09/21/21 -  Nicolás Hays PT Physical Therapist PT                  PT Recommendation and Plan  Planned Therapy Interventions (PT): balance training, gait training, bed  mobility training, home exercise program, patient/family education, postural re-education, ROM (range of motion), strengthening, transfer training, stair training  Plan of Care Reviewed With: patient  Progress: no change  Outcome Evaluation: Pt presents with decreased independence with mobility and generalized weakness. Pt ambulated 200ft with CGA and RW for support. Pt required increased cues to improve sequencing of AD. Recommend continued skilled IP PT interventions. Recommend D/C home with assist.     Time Calculation:   PT Evaluation Complexity  History, PT Evaluation Complexity: 1-2 personal factors and/or comorbidities  Examination of Body Systems (PT Eval Complexity): total of 3 or more elements  Clinical Presentation (PT Evaluation Complexity): stable  Clinical Decision Making (PT Evaluation Complexity): low complexity  Overall Complexity (PT Evaluation Complexity): low complexity     PT Charges       Row Name 08/30/23 1337             Time Calculation    Start Time 1117  -AE      PT Received On 08/30/23  -AE      PT Goal Re-Cert Due Date 09/09/23  -AE         Timed Charges    42939 - PT Therapeutic Activity Minutes 9  -AE         Untimed Charges    PT Eval/Re-eval Minutes 34  -AE         Total Minutes    Timed Charges Total Minutes 9  -AE      Untimed Charges Total Minutes 34  -AE       Total Minutes 43  -AE                User Key  (r) = Recorded By, (t) = Taken By, (c) = Cosigned By      Initials Name Provider Type    AE Nicolás Hays, PT Physical Therapist                  Therapy Charges for Today       Code Description Service Date Service Provider Modifiers Qty    16195280429 HC PT THERAPEUTIC ACT EA 15 MIN 8/30/2023 Nicolás Hays, PT GP 1    39141716128 HC PT EVAL LOW COMPLEXITY 3 8/30/2023 Nicolás Hays, PT GP 1            PT G-Codes  Outcome Measure Options: AM-PAC 6 Clicks Basic Mobility (PT)  AM-PAC 6 Clicks Score (PT): 17  PT Discharge Summary  Anticipated Discharge Disposition (PT):  home with assist    Nicolás Hays, PT  8/30/2023

## 2023-08-31 VITALS
SYSTOLIC BLOOD PRESSURE: 117 MMHG | TEMPERATURE: 98.4 F | OXYGEN SATURATION: 92 % | HEIGHT: 68 IN | RESPIRATION RATE: 18 BRPM | HEART RATE: 81 BPM | DIASTOLIC BLOOD PRESSURE: 74 MMHG | BODY MASS INDEX: 22.28 KG/M2 | WEIGHT: 147 LBS

## 2023-08-31 LAB
BASOPHILS # BLD AUTO: 0.03 10*3/MM3 (ref 0–0.2)
BASOPHILS NFR BLD AUTO: 0.3 % (ref 0–1.5)
DEPRECATED RDW RBC AUTO: 47.3 FL (ref 37–54)
EOSINOPHIL # BLD AUTO: 0 10*3/MM3 (ref 0–0.4)
EOSINOPHIL NFR BLD AUTO: 0 % (ref 0.3–6.2)
ERYTHROCYTE [DISTWIDTH] IN BLOOD BY AUTOMATED COUNT: 13.2 % (ref 12.3–15.4)
HCT VFR BLD AUTO: 29.4 % (ref 37.5–51)
HGB BLD-MCNC: 9.6 G/DL (ref 13–17.7)
IMM GRANULOCYTES # BLD AUTO: 0.04 10*3/MM3 (ref 0–0.05)
IMM GRANULOCYTES NFR BLD AUTO: 0.5 % (ref 0–0.5)
LYMPHOCYTES # BLD AUTO: 1.65 10*3/MM3 (ref 0.7–3.1)
LYMPHOCYTES NFR BLD AUTO: 18.7 % (ref 19.6–45.3)
MCH RBC QN AUTO: 32.2 PG (ref 26.6–33)
MCHC RBC AUTO-ENTMCNC: 32.7 G/DL (ref 31.5–35.7)
MCV RBC AUTO: 98.7 FL (ref 79–97)
MONOCYTES # BLD AUTO: 0.56 10*3/MM3 (ref 0.1–0.9)
MONOCYTES NFR BLD AUTO: 6.4 % (ref 5–12)
NEUTROPHILS NFR BLD AUTO: 6.53 10*3/MM3 (ref 1.7–7)
NEUTROPHILS NFR BLD AUTO: 74.1 % (ref 42.7–76)
NRBC BLD AUTO-RTO: 0 /100 WBC (ref 0–0.2)
PLATELET # BLD AUTO: 150 10*3/MM3 (ref 140–450)
PMV BLD AUTO: 11.5 FL (ref 6–12)
RBC # BLD AUTO: 2.98 10*6/MM3 (ref 4.14–5.8)
WBC NRBC COR # BLD: 8.81 10*3/MM3 (ref 3.4–10.8)

## 2023-08-31 PROCEDURE — 25010000002 HYDROMORPHONE PER 4 MG: Performed by: COLON & RECTAL SURGERY

## 2023-08-31 PROCEDURE — 25010000002 HEPARIN (PORCINE) PER 1000 UNITS: Performed by: COLON & RECTAL SURGERY

## 2023-08-31 PROCEDURE — 85025 COMPLETE CBC W/AUTO DIFF WBC: CPT | Performed by: COLON & RECTAL SURGERY

## 2023-08-31 RX ORDER — TAMSULOSIN HYDROCHLORIDE 0.4 MG/1
0.4 CAPSULE ORAL DAILY
Qty: 5 CAPSULE | Refills: 0 | Status: SHIPPED | OUTPATIENT
Start: 2023-09-01

## 2023-08-31 RX ORDER — HYDROCODONE BITARTRATE AND ACETAMINOPHEN 7.5; 325 MG/1; MG/1
1 TABLET ORAL EVERY 4 HOURS PRN
Qty: 25 TABLET | Refills: 0 | Status: SHIPPED | OUTPATIENT
Start: 2023-08-31

## 2023-08-31 RX ADMIN — HYDROMORPHONE HYDROCHLORIDE 0.5 MG: 1 INJECTION, SOLUTION INTRAMUSCULAR; INTRAVENOUS; SUBCUTANEOUS at 05:31

## 2023-08-31 RX ADMIN — LEVOTHYROXINE SODIUM 50 MCG: 0.05 TABLET ORAL at 05:25

## 2023-08-31 RX ADMIN — PREGABALIN 100 MG: 50 CAPSULE ORAL at 08:43

## 2023-08-31 RX ADMIN — PRAVASTATIN SODIUM 20 MG: 20 TABLET ORAL at 05:25

## 2023-08-31 RX ADMIN — DIAZEPAM 5 MG: 5 TABLET ORAL at 14:40

## 2023-08-31 RX ADMIN — TAMSULOSIN HYDROCHLORIDE 0.4 MG: 0.4 CAPSULE ORAL at 08:43

## 2023-08-31 RX ADMIN — HEPARIN SODIUM 5000 UNITS: 5000 INJECTION INTRAVENOUS; SUBCUTANEOUS at 14:39

## 2023-08-31 RX ADMIN — AMLODIPINE BESYLATE 2.5 MG: 2.5 TABLET ORAL at 08:43

## 2023-08-31 RX ADMIN — HYDROCODONE BITARTRATE AND ACETAMINOPHEN 1 TABLET: 7.5; 325 TABLET ORAL at 14:40

## 2023-08-31 RX ADMIN — ESCITALOPRAM OXALATE 40 MG: 20 TABLET ORAL at 08:43

## 2023-08-31 NOTE — CASE MANAGEMENT/SOCIAL WORK
Continued Stay Note  [facility]     Patient Name: Memo Steven  MRN: [id number]  Today's Date: 8/31/2023    Admit Date: 8/29/2023    Plan: home   Discharge Plan       Row Name 08/31/23 1533       Plan    Plan home    Patient/Family in Agreement with Plan yes    Plan Comments I spoke with the wife of this patient on the phone regarding his discharge home today. His family will transport him home. They deny having any further discharge planning needs at this time.    Final Discharge Disposition Code 01 - home or self-care                   Discharge Codes    No documentation.                 Expected Discharge Date and Time       Expected Discharge Date Expected Discharge Time    Aug 31, 2023               Jennifer Willams RN

## 2023-09-02 NOTE — DISCHARGE SUMMARY
Patient Name: Memo Steven  MRN: [id number]  : 1945  DO2023    Attending: No att. providers found    Primary Care Provider: Ramin Forman MD    Date of Admission:.2023 11:00 AM    Date of Discharge:  2023    Discharge Diagnosis:   S/P laparoscopy, ileocolic resection    Colon neoplasm    HTN (hypertension)    Anxiety and depression    Hypothyroidism (acquired)    Acute postoperative pain      Hospital Course  At admit    Patient is a pleasant 77 y.o. male presented for scheduled surgery by Dr. Etienne.     I saw patient preoperatively, discussed with patient and his wife patient's past medical history, expected surgery, plans for after surgery.     Subsequent notes indicate he later underwent laparoscopic ileocolic resection, surgery was done by Dr. Etienne under general anesthesia and a block, was tolerated well, he was admitted for further management.       After admit    He was provided pain medication as needed for pain control.  Pt received DVT prophylaxis with subcutaneous heparin as well as mechanicals      Patient was started on clear liquid diet, this was advanced when heshowed evidence of bowel function return.      Tolerated diet without difficulty.    During his stay , he  used an IS for atelectasis prophylaxis.    Home medications were resumed as appropriate, and labs were monitored and remained fairly stable.      With the progress he has made, pt is ready for DC home today.      Discussed with patient regarding plan and he shows understanding and agreement.       Procedures Performed  Procedure(s):  ILEOCOLIC RESECTION  LAPAROSCOPIC       Pertinent Test Results:    I reviewed the patient's new clinical results.   Results from last 7 days   Lab Units 23  0509 23  1817 23  0348   WBC 10*3/mm3 8.81 10.90* 7.40   HEMOGLOBIN g/dL 9.6* 10.0* 10.8*   HEMATOCRIT % 29.4* 29.6* 32.6*   PLATELETS 10*3/mm3 150 143 143     Results from last 7 days   Lab Units  "08/30/23  0348   SODIUM mmol/L 139   POTASSIUM mmol/L 4.3   CHLORIDE mmol/L 107   CO2 mmol/L 27.0   BUN mg/dL 15   CREATININE mg/dL 0.71*   CALCIUM mg/dL 7.6*   GLUCOSE mg/dL 144*     I reviewed the patient's new imaging including images and reports.       Discharge Assessment:       Visit Vitals  /74 (BP Location: Left arm, Patient Position: Lying)   Pulse 81   Temp 98.4 °F (36.9 °C) (Oral)   Resp 18   Ht 172.7 cm (68\")   Wt 66.7 kg (147 lb)   SpO2 92%   BMI 22.35 kg/m²     No data recorded.      General Appearance:    Alert, cooperative, in no acute distress   Lungs:     Clear to auscultation,respirations regular, even and                   unlabored    Heart:    Regular rhythm and normal rate, normal S1 and S2    Abdomen:   Soft and benign with clean incisions   Extremities:   Moves all extremities well, no edema, no cyanosis, no              redness   Pulses:   Pulses palpable and equal bilaterally   Skin:   No bleeding, bruising or rash            Discharge Disposition:home.           Discharge Medications        New Medications        Instructions Start Date   HYDROcodone-acetaminophen 7.5-325 MG per tablet  Commonly known as: Norco   1 tablet, Oral, Every 4 Hours PRN      tamsulosin 0.4 MG capsule 24 hr capsule  Commonly known as: FLOMAX   0.4 mg, Oral, Daily             Continue These Medications        Instructions Start Date   ALPRAZolam 1 MG tablet  Commonly known as: XANAX   1 mg, Oral, 3 Times Daily PRN      amLODIPine 2.5 MG tablet  Commonly known as: NORVASC   2.5 mg, Oral, Daily      cetirizine 10 MG tablet  Commonly known as: zyrTEC   10 mg, Oral, Daily PRN      cycloSPORINE 0.05 % ophthalmic emulsion  Commonly known as: RESTASIS   1 drop, 2 Times Daily      escitalopram 20 MG tablet  Commonly known as: LEXAPRO   40 mg, Oral, Every Morning      fenofibrate 160 MG tablet   No dose, route, or frequency recorded.      levothyroxine sodium 50 MCG capsule  Commonly known as: TIROSINT   50 mcg, " Oral, Every Early Morning      pregabalin 100 MG capsule  Commonly known as: LYRICA   100 mg, Oral, 2 Times Daily      Lyrica 100 MG capsule  Generic drug: pregabalin   No dose, route, or frequency recorded.      OLANZapine zydis 20 MG disintegrating tablet  Commonly known as: zyPREXA   20 mg, Translingual, Nightly      polyethylene glycol 17 GM/SCOOP powder  Commonly known as: MIRALAX   17 g, Oral, Daily PRN      pravastatin 20 MG tablet  Commonly known as: PRAVACHOL   20 mg, Oral, Nightly      traMADol 50 MG tablet  Commonly known as: ULTRAM   50 mg, Oral, Daily PRN      Vitamin D3 50 MCG (2000 UT) capsule   2,000 Units, Oral, Daily      Zetia 10 MG tablet  Generic drug: ezetimibe   10 mg, Oral, Nightly      zolpidem 10 MG tablet  Commonly known as: AMBIEN   10 mg, Nightly PRN               Discharge Diet: GI soft.     Activity at Discharge: ambulate.     Follow-up Appointments:   Guido Etienne MD per his orders.     Dragon disclaimer:  Part of this encounter note is an electronic transcription/translation of spoken language to printed text. The electronic translation of spoken language may permit erroneous, or at times, nonsensical words or phrases to be inadvertently transcribed; Although I have reviewed the note for such errors, some may still exist.       Altagracia Cage MD  09/02/23  14:28 EDT

## 2023-09-05 LAB
CYTO UR: NORMAL
LAB AP CASE REPORT: NORMAL
LAB AP CLINICAL INFORMATION: NORMAL
PATH REPORT.FINAL DX SPEC: NORMAL
PATH REPORT.GROSS SPEC: NORMAL

## 2024-10-03 ENCOUNTER — TRANSCRIBE ORDERS (OUTPATIENT)
Dept: ADMINISTRATIVE | Facility: HOSPITAL | Age: 79
End: 2024-10-03
Payer: MEDICARE

## 2024-10-03 DIAGNOSIS — R93.89 ABNORMAL CT SCAN: ICD-10-CM

## 2024-10-03 DIAGNOSIS — C18.9 MALIGNANT NEOPLASM OF COLON, UNSPECIFIED PART OF COLON: ICD-10-CM

## 2024-10-03 DIAGNOSIS — Z85.038 HISTORY OF COLON CANCER: ICD-10-CM

## 2024-10-08 ENCOUNTER — TELEPHONE (OUTPATIENT)
Dept: ONCOLOGY | Facility: CLINIC | Age: 79
End: 2024-10-08

## 2024-10-08 NOTE — TELEPHONE ENCOUNTER
[facility] received a fax that requires your attention. The document has been indexed to the patient’s chart for your review.      Reason for sending: RECORDS FOR UPCOMING APPOINTMENT.    Documents Description: ONCOLOGY REFERRAL, DEMOGRAPHICS, INSURANCE, PROGRESS NOTE 9-30-24, DISCHARGE SUMMARY,  ILEOCOLONOSCOPY - 7-, DEMOGRAPHICS, PROGRESS NOTE - 9-30-24, DISCHARGE SUMMARY 8-31-23, ILEOCOLONOSCOPY WITH SNARE POLYPECTOMY - 7-, ILEOCOLONOSCOPY W BIOPSY - 6-23-23, ILEOCOLONOSCOPY - 7-26-23, OP NOTE - 8-29-23, ILEOCOLONOSCOPY - 8-28-24, PATH - 8-29-23.    Name of Sender: ALONDRA ARNOLD MD    Date Indexed: 10/8/24     Notes (if needed): PT SCHEDULED FOR PET SCAN 10-16-24.

## 2024-10-16 ENCOUNTER — HOSPITAL ENCOUNTER (OUTPATIENT)
Dept: PET IMAGING | Facility: HOSPITAL | Age: 79
Discharge: HOME | End: 2024-10-16
Admitting: COLON & RECTAL SURGERY
Payer: MEDICARE

## 2024-10-16 DIAGNOSIS — R93.89 ABNORMAL CT SCAN: ICD-10-CM

## 2024-10-16 PROCEDURE — 0 FLUDEOXYGLUCOSE F18 SOLUTION: Performed by: COLON & RECTAL SURGERY

## 2024-10-16 PROCEDURE — A9552 F18 FDG: HCPCS | Performed by: COLON & RECTAL SURGERY

## 2024-10-16 PROCEDURE — 78815 PET IMAGE W/CT SKULL-THIGH: CPT

## 2024-10-16 RX ADMIN — FLUDEOXYGLUCOSE F 18 1 DOSE: 200 INJECTION, SOLUTION INTRAVENOUS at 10:52

## 2024-10-17 NOTE — PROGRESS NOTES
Memo Steven  [id number]  1945  10/21/2024      Referring Provider:   Guido Etienne MD    Reason for Consultation:   Colon Adenocarcinoma (Stage IIB, T4a, N0)      Chief Complaint:  Colon Adenocarcinoma (Stage IIB, T4a, N0)       History of Present Illness   Memo Steven is a very pleasant 79 y.o.  male who presents in new consultation at the request of Dr. 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    7/26/23: Ileocolonoscopy: Inverted mucosa at the appendix biopsy to evaluate for potential atypia, sessile serrated tissue demonstrated on previous biopsy fungating from the base of the cecum and extending in proximity to the ileocecal valve.  Cecal neoplasia with recommendation for ileocecal resection to remove for diagnostic and therapeutic benefit  8/18/23: CT A/P: No evidence of obstruction nor inflammation.  There is a 3.8 x 2.3 cm masslike area within the posterior inferior sacrum near the appendiceal orifice presumably representing patient's cecal mass, otherwise no metastatic            disease seen.  8/28/23: Right ileocolic resection: Invasive moderately differentiated adenocarcinoma extending to the serosal surface, pT4a, 0/16 lymph nodes negative, surgical margins negative. Tumor size: 2.2 cm, G2 moderately differentiated, invades visceral peritoneum, tumor perforation not identified, lymph-vascular invasion not identified, perineural invasion not identified. Distance from closest proximal mucosal margin is 4 cm. Large sessile serrated adenoma, negative for dysplasia or carcinoma, multiple benign lymph nodes, surgical margins are free of dysplasia.  5/1/24: CT A/P due to worsening abdominal pain showed markedly abnormal appearance of the cecum. Tiny amount of extraluminal air in this region. There are postoperative changes in this region. He was evaluated by Dr. Paul with surgery who recommended outpatient management and was prescribed  "Augmentin for diverticulitis with close follow up.   6/28/24: Ileocolonoscopy: No evidence of neoplasia in the ileocolic region noting CT with \"abnormal appearance of the cecum\" and previous resection of T4 N0 cancer.  Dense diverticular changes in the left and sigmoid colon.  Internal hemorrhoids.  7/5/24: Was evaluated in the ED for abdominal pain. CT A/P showed a moderate abnormal appearance of the cecum is again noted with moderate surrounding inflammatory changes, fluid, and mild mural colon wall thickening suggestive of acute severe diverticulitis/focal colitis. Increased gaseous distention seen throughout the large and small bowel suggesting a component of ileus, there is hyper enhancing soft tissue nodule adjacent to this region of inflammatory change measuring 8 mm which may reflect a reactive mesenteric lymph node. He was treated with Augmentin for diverticulitis.   9/3/24: CT A/P: Interval increase in size of small enhancing nodule medial to the ileocolic anastomosis.  This now measures 11 x 11 mm and measured less than 5 mm on the study from 5 months prior.  This is consistent with doubling over 5 months and concerning for malignancy.  10/16/24: PET/CT: No hypermetabolic masses or nodule seen in the area of the iliac colic anastomosis no hypermetabolic abdominal or pelvic adenopathy and right hemicolectomy changes seen. Relatively increased hypermetabolic activity involving the right posterior hepatic lobe central aspect greater than the remainder of the background.  Hypermetabolic mediastinal and hilar adenopathy following in the range of malignancy.      Mr. Steven has had worsening constipation since his surgery last year he denies of any blood in the stool. He denies of any weight changes, shortness of breath, abdominal pain.      The following portions of the patient's history were reviewed and updated as appropriate: allergies, current medications, past family history, past medical history, past " social history, past surgical history and problem list.    Allergies   Allergen Reactions    Aspirin GI Intolerance    Nsaids GI Intolerance       Past Medical History:   Diagnosis Date    Anxiety disorder     Arthritis     Colon polyps     Depression     Diverticulitis     Fibromyalgia     High blood pressure     High cholesterol     High triglycerides     Panic attacks     Thyroid disorder    Fibromylagia       Past Surgical History:   Procedure Laterality Date    CHOLECYSTECTOMY  2017    COLON RESECTION N/A 8/29/2023    Procedure: ILEOCOLIC RESECTION  LAPAROSCOPIC;  Surgeon: Guido Etienne MD;  Location: [facility];  Service: General;  Laterality: N/A;    COLONOSCOPY      LARYNX SURGERY      NECK SURGERY      disc fusion         Social History     Socioeconomic History    Marital status:    Tobacco Use    Smoking status: Never    Smokeless tobacco: Never   Vaping Use    Vaping status: Never Used   Substance and Sexual Activity    Alcohol use: No    Drug use: No    Sexual activity: Defer         Family History   Problem Relation Age of Onset    Breast cancer Sister     Prostate cancer Brother     Heart disease Brother            Current Outpatient Medications:     Linzess 72 MCG capsule capsule, Take 1 capsule by mouth Every Morning Before Breakfast., Disp: , Rfl:     ALPRAZolam (XANAX) 1 MG tablet, Take 1 tablet by mouth 3 (Three) Times a Day As Needed., Disp: , Rfl: 5    amLODIPine (NORVASC) 2.5 MG tablet, Take 1 tablet by mouth Daily., Disp: , Rfl: 1    cetirizine (zyrTEC) 10 MG tablet, Take 1 tablet by mouth Daily As Needed., Disp: , Rfl: 3    Cholecalciferol (Vitamin D3) 50 MCG (2000 UT) capsule, Take 1 capsule by mouth Daily., Disp: , Rfl:     cycloSPORINE (RESTASIS) 0.05 % ophthalmic emulsion, 1 drop 2 (Two) Times a Day., Disp: , Rfl:     escitalopram (LEXAPRO) 20 MG tablet, Take 2 tablets by mouth Every Morning., Disp: , Rfl: 5    fluticasone (VERAMYST) 27.5 MCG/SPRAY nasal spray, Administer 2  sprays into the nostril(s) as directed by provider Daily., Disp: , Rfl:     levothyroxine sodium (TIROSINT) 50 MCG capsule, Take 1 capsule by mouth Every Morning., Disp: , Rfl: 1    OLANZapine zydis (zyPREXA) 20 MG disintegrating tablet, Place 1 tablet on the tongue Every Night., Disp: , Rfl:     polyethylene glycol (MIRALAX) powder, Take 17 g by mouth Daily As Needed., Disp: , Rfl: 5    pravastatin (PRAVACHOL) 20 MG tablet, Take 1 tablet by mouth Every Night., Disp: , Rfl: 1    pregabalin (LYRICA) 100 MG capsule, Take 1 capsule by mouth 2 (Two) Times a Day., Disp: , Rfl:     traMADol (ULTRAM) 50 MG tablet, Take 1 tablet by mouth Daily As Needed for Moderate Pain., Disp: , Rfl:     ZETIA 10 MG tablet, Take 1 tablet by mouth Every Night., Disp: , Rfl: 1    zolpidem (AMBIEN) 10 MG tablet, 1 tablet At Night As Needed., Disp: , Rfl: 5          Review of Systems  Constitutional: No fever, chills, night sweats or weight loss.   HEENT:  No headaches, vision changes or hearing changes, no sinus drainage, sore throat.   Cardiovascular:  No palpitations, chest pain, syncopal episodes or edema.   Pulmonary:  No shortness of breath, hemoptysis, or cough.   Gastrointestinal:  No nausea or vomiting.  No constipation or diarrhea. No abdominal pain. No melena or hematochezia.   Genitourinary:  No hematuria, or changes in urination.   Musculoskeletal:  No pain, or joint problems.   Skin: No rashes or pruritus.   Endocrine:  No hot flashes or chills   Hematologic:  No history of free bleeding, spontaneous bleeding or clotting problems. No lymphadenopathy.    Immunologic:  No allergies or frequent infections.   Neurologic: No numbness, tingling, or weakness.   Psychiatric:  Positive anxiety or depression.         Physical Exam  Vital Signs: These were reviewed and listed as per patient’s electronic medical chart  Vitals:    10/21/24 1254   BP: 93/62   Pulse: 82   Resp: 18   Temp: 97.5 °F (36.4 °C)   SpO2: 96%     General: Awake, alert  and oriented, in no distress  HEENT: Head is atraumatic, normocephalic, extraocular movements full, no scleral icterus  Neck: supple, no jvd, lymphadenopathy or masses  Cardiovascular: regular rate and rhythm without murmurs, rubs or gallops  Pulmonary: non-labored, clear to auscultation bilaterally, no wheezing  Abdomen: soft, non-tender, non-distended, normal active bowel sounds present, no organomegaly  Extremities: No clubbing, cyanosis or edema  Lymph: No cervical, supraclavicular adenopathy  Neurologic: Mental status as above, alert, awake and oriented, grossly non-focal exam  Skin: warm, dry, intact        Pain Score:  Pain Score    10/21/24 1254   PainSc: 0-No pain           PHQ-Score Total:  PHQ-9 Total Score:          PROCEDURES:  7/14/17 6/23/23 7/26/23 6/28/24            IMAGING:  CT Abdomen Pelvis With Contrast (08/18/2023 15:56)   Findings:LUNG BASES:  Unremarkable without mass or infiltrate.LIVER:  Unremarkable parenchyma without focal lesion. BILIARY/GALLBLADDER: Cholecystectomy  SPLEEN:  Unremarkable. PANCREAS:  Unremarkable. ADRENAL:  Unremarkable.KIDNEYS:  Unremarkable parenchyma with no solid mass identified. No obstruction.  No calculus identified.GASTROINTESTINAL/MESENTERY:  No evidence of obstruction nor inflammation.  There is a 3.8 x 2.3 cm masslike area within the posterior inferior sacrum near the appendiceal orifice presumably representing patient's cecal mass (image 86, series 2).MESENTERIC VESSELS:  Patent.AORTA/IVC:  Normal caliber.RETROPERITONEUM/LYMPH NODES:  Unremarkable. REPRODUCTIVE:  Unremarkable. BLADDER:  Unremarkable. OSSEUS STRUCTURES:  Typical for age with no acute process identified.  IMPRESSION: 1.No evidence of metastatic disease within the abdomen or pelvis.2.Vague cecal masslike area presumably representing patient's primary diagnosis.     CT Abdomen Pelvis With Contrast (05/01/2024 13:59)   FINDINGS:ABDOMEN: There is mild right basilar atelectasis. The  gallbladder is absent. There is no hydronephrosis or nephrolithiasis. The solid organs are otherwise unremarkable. There is no free fluid or adenopathy.PELVIS: There are postoperative changes in the region of the cecum. There is marked surrounding fluid, inflammation, stranding and possible soft tissue. There is a tiny amount of extraluminal air in this region. The urinary bladder is unremarkable. There is no adenopathy.   IMPRESSION: Markedly abnormal appearance of the cecum. There are postoperative changes in this region. Surrounding fluid and stranding is present. A tiny amount of extraluminal air is noted. This may be postoperative in nature. Alternatively, this may be inflammatory, infectious or neoplastic in nature.     CT Abdomen Pelvis With Contrast (07/05/2024 20:51)   FINDINGS: LOWER CHEST: The heart is normal in size. Basal atelectasis, right greater than left..ABDOMEN/PELVIS: Liver, gallbladder and bile ducts: The liver enhances homogenously without suspicious focal hepatic lesion. Prior cholecystectomy. No significant biliary ductal dilatation.Adrenal glands: The adrenal glands are morphologically unremarkable without suspicious lesion.Kidneys, ureters and urinary bladder: No suspicious renal lesions. No hydronephrosis. Unremarkable urinary   bladder. Spleen: The spleen is normal in size. Pancreas: The pancreas is unremarkable. Gastrointestinal system and mesentery: There is no evidence of bowel obstruction. Postoperative changes of the sacrum are again noted. Appendix is surgically absent. Moderate abnormal appearance of the cecum is again noted with moderate surrounding inflammatory changes, moderate surrounding fluid and mild mural thickening of the colonic wall within this region. Findings are suggestive of acute severe diverticulitis/focal colitis. No definite evidence to suggest perforation. There is increased gaseous distention seen throughout the large and small bowel suggesting a component of  ileus. There is a hyperenhancing soft tissue nodule adjacent to this region of inflammatory change measuring 8 mm which may reflect a reactive mesenteric lymph node (series 2, image 53). Neoplastic involvement is not excluded. No significant mesenteric inflammation. Lymph nodes: No pathologically enlarged abdominal or pelvic lymph nodes are present. Vessels: The abdominal aorta is normal in caliber. The celiac trunk, superior mesenteric artery, inferior mesenteric artery and their branch vessels appear grossly patent. The superior mesenteric vein, splenic vein and main portal veins are patent. The inferior vena cava and hepatic veins   are unremarkable. Peritoneum: No free intraperitoneal fluid or pneumoperitoneum. Pelvic viscera: No acute findings. Body wall: No acute findings. No significant body wall hernias. Bones: No acute fracture.   IMPRESSION: Similar appearance to the cecum and adjacent large bowel loops on today's study with appearance suggestive of acute focal diverticulitis/colitis. Significant surrounding fluid and inflammatory change. No definite evidence of gross perforation. Adjacent 8 mm soft tissue density may reflect a reactive lymph node, however, neoplastic metastatic theo involvement is not excluded. Increased gaseous distention of the bowel on today's study appears most consistent with a component of ileus.     CT Abdomen Pelvis With Contrast (09/03/2024 16:04)   FINDINGS: There are dependent densities in the lung bases consistent with atelectasis. The gallbladder is surgically absent. There is no biliary ductal dilation. The spleen, adrenal glands, and pancreas are unremarkable. The kidneys demonstrate no stones or hydronephrosis. There is a lower pole right renal 1 cm hypodensity most consistent with a cyst. This is unchanged from the prior study. There are trace vascular calcifications of the aorta. There is no aneurysm. The urinary bladder is unremarkable. The GI tract demonstrates no  obstruction. There is increased gas within the stomach. The small bowel is unremarkable. There is no bowel obstruction. The patient is status post ileocecectomy with anastomosis. Significant inflammatory changes seen on the prior study have resolved. A previously described small enhancing nodule medial to the anastomosis now measures 11 x 11 mm and previously measured 8 x 8.5 mm. This measures less than 5 mm on a study from 5 months prior. This   has essentially doubled in 5 months which is concerning for malignancy. A reactive/inflammatory node is still considered in the differential. There is no other mesenteric or retroperitoneal adenopathy identified. There is moderate pandiverticulosis predominantly involving the ascending, transverse, and descending colon. There is sparing of the sigmoid colon. There is no free air, free fluid or inflammatory process.   IMPRESSION: 1. Resolved inflammatory changes in the right lower quadrant. 2. No obstruction. 3. Moderate pandiverticulosis without diverticulitis. 4. Interval increase in size of a small enhancing nodule medial to the ileocolic anastomosis. This now measures 11 x 11 mm and measured less than 5 mm on the study from 5 months prior. This is consistent with doubling over 5 months. This is concerning for malignancy. 5. There is no free air, free fluid or inflammatory process.     NM PET/CT Skull Base to Mid Thigh (10/16/2024 11:15)   FINDINGS:HEAD:Brain and extra-axial spaces:  Unremarkable as visualized.Nasopharynx:  Unremarkable as visualized.NECK:Hypopharynx:  Unremarkable as visualized.Larynx:  Unremarkable as visualized.Trachea:  Unremarkable as visualized.Retropharyngeal space:  Unremarkable as visualized.Submandibular/parotid glands:  Unremarkable as visualized.  Glands are normal in size.  Thyroid:  Unremarkable as visualized.  No enlarged or calcified nodules. CHEST:Lungs and pleural spaces:  Unremarkable as visualized.  No mass.  No consolidation.  No  significant effusion.  No pneumothorax.  Heart:  Unremarkable as visualized.  No cardiomegaly.  No significant pericardial effusion.  Mediastinum:  Unremarkable as visualized.  No mass. ABDOMEN:  Liver:  Focus of relatively increased FDG hypermetabolic activity  involving the right posterior hepatic lobe central aspect greater than the remainder of background hepatic FDG hypermetabolic activity with maximum SUV: 4.0. Consider further evaluation with dedicated liver imaging either CT, MRI, or ultrasound.Gallbladder and bile ducts:  Unremarkable as visualized.  No calcified stones.  No ductal dilation.  Pancreas:  Unremarkable as visualized.  No ductal dilation.  Spleen:  Unremarkable as visualized.  No splenomegaly.  Adrenals:  Unremarkable as visualized.  No mass.  Kidneys and ureters:  Unremarkable as visualized.  Stomach and bowel:  Right hemicolectomy changes are noted.  Colonic diverticulosis without diverticulitis.  No obstruction.  No FDG hypermetabolic masses or nodules are seen in the area of ileocolic anastomosis.PELVIS: Appendix:  See above. Bladder:  Unremarkable as visualized. Reproductive:  Prostate enlargement.WHOLE BODY: Intraperitoneal space:  Unremarkable as visualized.  No significant fluid collection.  No free air.  Bones/joints:  Unremarkable as visualized.  No acute fracture.  No dislocation.  Soft tissues:  Unremarkable as visualized.  Vasculature:  Unremarkable as visualized.  No aortic aneurysm.  Lymph nodes:  1.5 cm left infrahilar lymph node or central pulmonary nodule with marked FDG hypermetabolism and maximum SUV: 7.1.  Left  suprahilar node with FDG hypermetabolism and maximum SUV: 6.2.  A right middle hilar node with FDG hypermetabolism with maximum SUV: 4.4.  No FDG hypermetabolic abdominal or pelvic adenopathy.  IMPRESSION:1.  No FDG hypermetabolic masses or nodules are seen in the area of ileocolic anastomosis.2.  No FDG hypermetabolic abdominal or pelvic adenopathy.  3.  Right  hemicolectomy changes are noted.4.  Focus of relatively increased FDG hypermetabolic activity involving the right posterior hepatic lobe central aspect greater than the remainder of background hepatic FDG hypermetabolic activity with maximum SUV: 4.0. Consider further evaluation with dedicated liver imaging either CT, MRI, or ultrasound. 5. FDG hypermetabolic mediastinal and hilar adenopathy with maximum SUV values that fall within the range of malignancy. 6. 1.5 cm left infrahilar lymph node or central pulmonary nodule with marked FDG hypermetabolism and maximum SUV: 7.1.7.  Left suprahilar node with FDG hypermetabolism and maximum SUV: 6.2.  8.  A right middle hilar node with FDG hypermetabolism with maximum SUV:4.4.        LABS/STUDIES:   No visits with results within 6 Month(s) from this visit.   Latest known visit with results is:   Admission on 08/29/2023, Discharged on 08/31/2023   Component Date Value    ABO Type 08/29/2023 O     RH type 08/29/2023 Positive     Antibody Screen 08/29/2023 Negative     T&S Expiration Date 08/29/2023 9/1/2023 11:59:59 PM     Case Report 08/29/2023                      Value:Surgical Pathology Report                         Case: [id number]                                  Authorizing Provider:  Guido Etienne MD         Collected:           08/29/2023 03:47 PM          Ordering Location:     [facility]   Received:            08/30/2023 06:41 AM                                 OR                                                                           Pathologist:           Artemio Mckeon MD                                                       Specimen:    Large Intestine, Right / Ascending Colon, RIGHT ILIEOCOLIC RESECTION                       Clinical Information 08/29/2023                      Value:This result contains rich text formatting which cannot be displayed here.    Final Diagnosis 08/29/2023                      Value:This result contains rich  text formatting which cannot be displayed here.    Gross Description 08/29/2023                      Value:This result contains rich text formatting which cannot be displayed here.    Microscopic Description 08/29/2023                      Value:This result contains rich text formatting which cannot be displayed here.    Glucose 08/30/2023 144 (H)     BUN 08/30/2023 15     Creatinine 08/30/2023 0.71 (L)     Sodium 08/30/2023 139     Potassium 08/30/2023 4.3     Chloride 08/30/2023 107     CO2 08/30/2023 27.0     Calcium 08/30/2023 7.6 (L)     BUN/Creatinine Ratio 08/30/2023 21.1     Anion Gap 08/30/2023 5.0     eGFR 08/30/2023 95.1     WBC 08/30/2023 7.40     RBC 08/30/2023 3.38 (L)     Hemoglobin 08/30/2023 10.8 (L)     Hematocrit 08/30/2023 32.6 (L)     MCV 08/30/2023 96.4     MCH 08/30/2023 32.0     MCHC 08/30/2023 33.1     RDW 08/30/2023 12.8     RDW-SD 08/30/2023 45.4     MPV 08/30/2023 12.4 (H)     Platelets 08/30/2023 143     Neutrophil % 08/30/2023 84.8 (H)     Lymphocyte % 08/30/2023 8.9 (L)     Monocyte % 08/30/2023 5.9     Eosinophil % 08/30/2023 0.0 (L)     Basophil % 08/30/2023 0.0     Immature Grans % 08/30/2023 0.4     Neutrophils, Absolute 08/30/2023 6.27     Lymphocytes, Absolute 08/30/2023 0.66 (L)     Monocytes, Absolute 08/30/2023 0.44     Eosinophils, Absolute 08/30/2023 0.00     Basophils, Absolute 08/30/2023 0.00     Immature Grans, Absolute 08/30/2023 0.03     nRBC 08/30/2023 0.0     WBC 08/30/2023 10.90 (H)     RBC 08/30/2023 3.05 (L)     Hemoglobin 08/30/2023 10.0 (L)     Hematocrit 08/30/2023 29.6 (L)     MCV 08/30/2023 97.0     MCH 08/30/2023 32.8     MCHC 08/30/2023 33.8     RDW 08/30/2023 12.9     RDW-SD 08/30/2023 45.0     MPV 08/30/2023 11.9     Platelets 08/30/2023 143     Neutrophil % 08/30/2023 76.6 (H)     Lymphocyte % 08/30/2023 13.7 (L)     Monocyte % 08/30/2023 9.1     Eosinophil % 08/30/2023 0.0 (L)     Basophil % 08/30/2023 0.1     Immature Grans % 08/30/2023 0.5      Neutrophils, Absolute 08/30/2023 8.36 (H)     Lymphocytes, Absolute 08/30/2023 1.49     Monocytes, Absolute 08/30/2023 0.99 (H)     Eosinophils, Absolute 08/30/2023 0.00     Basophils, Absolute 08/30/2023 0.01     Immature Grans, Absolute 08/30/2023 0.05     nRBC 08/30/2023 0.0     WBC 08/31/2023 8.81     RBC 08/31/2023 2.98 (L)     Hemoglobin 08/31/2023 9.6 (L)     Hematocrit 08/31/2023 29.4 (L)     MCV 08/31/2023 98.7 (H)     MCH 08/31/2023 32.2     MCHC 08/31/2023 32.7     RDW 08/31/2023 13.2     RDW-SD 08/31/2023 47.3     MPV 08/31/2023 11.5     Platelets 08/31/2023 150     Neutrophil % 08/31/2023 74.1     Lymphocyte % 08/31/2023 18.7 (L)     Monocyte % 08/31/2023 6.4     Eosinophil % 08/31/2023 0.0 (L)     Basophil % 08/31/2023 0.3     Immature Grans % 08/31/2023 0.5     Neutrophils, Absolute 08/31/2023 6.53     Lymphocytes, Absolute 08/31/2023 1.65     Monocytes, Absolute 08/31/2023 0.56     Eosinophils, Absolute 08/31/2023 0.00     Basophils, Absolute 08/31/2023 0.03     Immature Grans, Absolute 08/31/2023 0.04     nRBC 08/31/2023 0.0          PATHOLOGY:   8/29/23              Assessment & Plan   Memo Steven is a very pleasant 79 y.o.  male who presents in new consultation at the request of Dr. 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    Colon adenocarcinoma  Mediastinal/hilar adenopathy  - Patient was diagnosed with a T4a, N0 colon adenocarcinoma in August 2023 after he had a right ileocolic resection performed by Dr. Etienne. At the end of April 2024 he began experiencing recurrent abdominal pain that was felt to be related to diverticulitis and would resolve with antibiotic treatment. He also had a repeat colonoscopy in June 2024 with Dr. Etienne that did not reveal any evidence of malignancy. However repeat CT imaging in September 2024 showed an interval increase in size in nodules that are medial to the ileocolic anastomosis, recently  measuring 11 x 11 mm and doubling over 5 months which was felt to be concerning for malignancy. PET/CT obtained October 2024 did not reveal any hypermetabolic masses or nodules seen in the area of the iliac colic anastomosis and no hypermetabolic abdominal or pelvic adenopathy changes were seen. However there was increased hypermetabolic mediastinal and hilar adenopathy following in the range of malignancy.    - Given CT findings will obtain MRI imaging to further evaluate nodules seen near anastomosis site. Will also place pulmonary referral for bronchoscopy to further evaluate hypermetabolic activity seen in the chest. CEA pending.      ACO / KAYKAY/Other  Quality measures  -  Memo Steven  reports that he has never smoked. He has never used smokeless tobacco.   -  Memo Steven received 2024 flu vaccine.  -  Memo Steven reports a pain score of 0.  Given his pain assessment as noted, treatment options were discussed and the following options were decided upon as a follow-up plan to address the patient's pain: continuation of current treatment plan for pain.  -  Current outpatient and discharge medications have been reconciled for the patient.  Reviewed by: Ila Monterroso MD      I will have the patient return in follow up appointment to review test results in two weeks. He understands that should he have any questions or concerns prior to his appointment he should give us a call at any time and I would be happy to see him sooner. It was a pleasure to see this patient in clinic today, thank you for allowing me to participate in the care of this patient.    I spent 60 minutes caring for patient on this date of service. This time includes time spent by me in the following activities: preparing for the visit, reviewing tests, obtaining and/or reviewing a separately obtained history, performing a medically appropriate examination and/or evaluation, counseling and educating the patient/family/caregiver, ordering  medications, tests, or procedures, documenting information in the medical record, independently interpreting results and communicating that information with the patient/family/caregiver, and care coordination as well as answering any questions.      Ila Monterroso MD   10/21/24   13:15 EDT

## 2024-10-21 ENCOUNTER — LAB (OUTPATIENT)
Dept: ONCOLOGY | Facility: CLINIC | Age: 79
End: 2024-10-21
Payer: MEDICARE

## 2024-10-21 ENCOUNTER — CONSULT (OUTPATIENT)
Dept: ONCOLOGY | Facility: CLINIC | Age: 79
End: 2024-10-21
Payer: MEDICARE

## 2024-10-21 VITALS
BODY MASS INDEX: 22.73 KG/M2 | RESPIRATION RATE: 18 BRPM | OXYGEN SATURATION: 96 % | DIASTOLIC BLOOD PRESSURE: 62 MMHG | TEMPERATURE: 97.5 F | HEART RATE: 82 BPM | WEIGHT: 150 LBS | HEIGHT: 68 IN | SYSTOLIC BLOOD PRESSURE: 93 MMHG

## 2024-10-21 DIAGNOSIS — R59.0 MEDIASTINAL ADENOPATHY: Primary | ICD-10-CM

## 2024-10-21 DIAGNOSIS — C18.9 MALIGNANT NEOPLASM OF COLON, UNSPECIFIED PART OF COLON: ICD-10-CM

## 2024-10-21 LAB
ALBUMIN SERPL-MCNC: 4.1 G/DL (ref 3.5–5.2)
ALBUMIN/GLOB SERPL: 1.3 G/DL
ALP SERPL-CCNC: 70 U/L (ref 39–117)
ALT SERPL W P-5'-P-CCNC: 14 U/L (ref 1–41)
ANION GAP SERPL CALCULATED.3IONS-SCNC: 11.1 MMOL/L (ref 5–15)
AST SERPL-CCNC: 17 U/L (ref 1–40)
BASOPHILS # BLD AUTO: 0.05 10*3/MM3 (ref 0–0.2)
BASOPHILS NFR BLD AUTO: 0.7 % (ref 0–1.5)
BILIRUB SERPL-MCNC: 0.3 MG/DL (ref 0–1.2)
BUN SERPL-MCNC: 17 MG/DL (ref 8–23)
BUN/CREAT SERPL: 18.1 (ref 7–25)
CALCIUM SPEC-SCNC: 9.3 MG/DL (ref 8.6–10.5)
CHLORIDE SERPL-SCNC: 102 MMOL/L (ref 98–107)
CO2 SERPL-SCNC: 26.9 MMOL/L (ref 22–29)
CREAT SERPL-MCNC: 0.94 MG/DL (ref 0.76–1.27)
CRP SERPL-MCNC: 1.45 MG/DL (ref 0–0.5)
DEPRECATED RDW RBC AUTO: 46 FL (ref 37–54)
EGFRCR SERPLBLD CKD-EPI 2021: 83 ML/MIN/1.73
EOSINOPHIL # BLD AUTO: 0.04 10*3/MM3 (ref 0–0.4)
EOSINOPHIL NFR BLD AUTO: 0.5 % (ref 0.3–6.2)
ERYTHROCYTE [DISTWIDTH] IN BLOOD BY AUTOMATED COUNT: 13.1 % (ref 12.3–15.4)
GLOBULIN UR ELPH-MCNC: 3.1 GM/DL
GLUCOSE SERPL-MCNC: 167 MG/DL (ref 65–99)
HCT VFR BLD AUTO: 40.7 % (ref 37.5–51)
HGB BLD-MCNC: 13.8 G/DL (ref 13–17.7)
IMM GRANULOCYTES # BLD AUTO: 0.04 10*3/MM3 (ref 0–0.05)
IMM GRANULOCYTES NFR BLD AUTO: 0.5 % (ref 0–0.5)
LYMPHOCYTES # BLD AUTO: 1.31 10*3/MM3 (ref 0.7–3.1)
LYMPHOCYTES NFR BLD AUTO: 17.7 % (ref 19.6–45.3)
MCH RBC QN AUTO: 32.5 PG (ref 26.6–33)
MCHC RBC AUTO-ENTMCNC: 33.9 G/DL (ref 31.5–35.7)
MCV RBC AUTO: 96 FL (ref 79–97)
MONOCYTES # BLD AUTO: 0.57 10*3/MM3 (ref 0.1–0.9)
MONOCYTES NFR BLD AUTO: 7.7 % (ref 5–12)
NEUTROPHILS NFR BLD AUTO: 5.41 10*3/MM3 (ref 1.7–7)
NEUTROPHILS NFR BLD AUTO: 72.9 % (ref 42.7–76)
NRBC BLD AUTO-RTO: 0 /100 WBC (ref 0–0.2)
PLATELET # BLD AUTO: 198 10*3/MM3 (ref 140–450)
PMV BLD AUTO: 11.2 FL (ref 6–12)
POTASSIUM SERPL-SCNC: 4.3 MMOL/L (ref 3.5–5.2)
PROT SERPL-MCNC: 7.2 G/DL (ref 6–8.5)
RBC # BLD AUTO: 4.24 10*6/MM3 (ref 4.14–5.8)
SODIUM SERPL-SCNC: 140 MMOL/L (ref 136–145)
WBC NRBC COR # BLD AUTO: 7.42 10*3/MM3 (ref 3.4–10.8)

## 2024-10-21 PROCEDURE — 80053 COMPREHEN METABOLIC PANEL: CPT | Performed by: INTERNAL MEDICINE

## 2024-10-21 PROCEDURE — 86140 C-REACTIVE PROTEIN: CPT | Performed by: INTERNAL MEDICINE

## 2024-10-21 PROCEDURE — 85025 COMPLETE CBC W/AUTO DIFF WBC: CPT | Performed by: INTERNAL MEDICINE

## 2024-10-21 PROCEDURE — 82378 CARCINOEMBRYONIC ANTIGEN: CPT | Performed by: INTERNAL MEDICINE

## 2024-10-21 PROCEDURE — 36415 COLL VENOUS BLD VENIPUNCTURE: CPT | Performed by: INTERNAL MEDICINE

## 2024-10-21 RX ORDER — LINACLOTIDE 72 UG/1
72 CAPSULE, GELATIN COATED ORAL
COMMUNITY
Start: 2024-09-24

## 2024-10-21 NOTE — PROGRESS NOTES
Venipuncture Blood Specimen Collection  Venipuncture performed in Left arm by Radha Ornelas MA with good hemostasis. Patient tolerated the procedure well without complications.   10/21/24   Radha Ornelas MA

## 2024-10-21 NOTE — LETTER
October 21, 2024     Ramin Forman MD  [address]  [address] [address]    Patient: Memo Steven   YOB: 1945   Date of Visit: 10/21/2024     Dear Ramin Forman MD:       Thank you for referring Memo Steven to me for evaluation. Below are the relevant portions of my assessment and plan of care.    If you have questions, please do not hesitate to call me. I look forward to following Memo along with you.         Sincerely,        Ila Monterroso MD        CC: MD Kriss Stovall Swati, MD  10/21/24 1542  Sign when Signing Visit  Memo Steven  [id number]  1945  10/21/2024      Referring Provider:   Guido Etienne MD    Reason for Consultation:   Colon Adenocarcinoma (Stage IIB, T4a, N0)      Chief Complaint:  Colon Adenocarcinoma (Stage IIB, T4a, N0)       History of Present Illness   Memo Steven is a very pleasant 79 y.o.  male who presents in new consultation at the request of Dr. 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    7/26/23: Ileocolonoscopy: Inverted mucosa at the appendix biopsy to evaluate for potential atypia, sessile serrated tissue demonstrated on previous biopsy fungating from the base of the cecum and extending in proximity to the ileocecal valve.  Cecal neoplasia with recommendation for ileocecal resection to remove for diagnostic and therapeutic benefit  8/18/23: CT A/P: No evidence of obstruction nor inflammation.  There is a 3.8 x 2.3 cm masslike area within the posterior inferior sacrum near the appendiceal orifice presumably representing patient's cecal mass, otherwise no metastatic            disease seen.  8/28/23: Right ileocolic resection: Invasive moderately differentiated adenocarcinoma extending to the serosal surface, pT4a, 0/16 lymph nodes negative, surgical margins negative. Tumor size: 2.2 cm, G2 moderately differentiated, invades visceral peritoneum, tumor perforation not  "identified, lymph-vascular invasion not identified, perineural invasion not identified. Distance from closest proximal mucosal margin is 4 cm. Large sessile serrated adenoma, negative for dysplasia or carcinoma, multiple benign lymph nodes, surgical margins are free of dysplasia.  5/1/24: CT A/P due to worsening abdominal pain showed markedly abnormal appearance of the cecum. Tiny amount of extraluminal air in this region. There are postoperative changes in this region. He was evaluated by Dr. Paul with surgery who recommended outpatient management and was prescribed Augmentin for diverticulitis with close follow up.   6/28/24: Ileocolonoscopy: No evidence of neoplasia in the ileocolic region noting CT with \"abnormal appearance of the cecum\" and previous resection of T4 N0 cancer.  Dense diverticular changes in the left and sigmoid colon.  Internal hemorrhoids.  7/5/24: Was evaluated in the ED for abdominal pain. CT A/P showed a moderate abnormal appearance of the cecum is again noted with moderate surrounding inflammatory changes, fluid, and mild mural colon wall thickening suggestive of acute severe diverticulitis/focal colitis. Increased gaseous distention seen throughout the large and small bowel suggesting a component of ileus, there is hyper enhancing soft tissue nodule adjacent to this region of inflammatory change measuring 8 mm which may reflect a reactive mesenteric lymph node. He was treated with Augmentin for diverticulitis.   9/3/24: CT A/P: Interval increase in size of small enhancing nodule medial to the ileocolic anastomosis.  This now measures 11 x 11 mm and measured less than 5 mm on the study from 5 months prior.  This is consistent with doubling over 5 months and concerning for malignancy.  10/16/24: PET/CT: No hypermetabolic masses or nodule seen in the area of the iliac colic anastomosis no hypermetabolic abdominal or pelvic adenopathy and right hemicolectomy changes seen. Relatively increased " hypermetabolic activity involving the right posterior hepatic lobe central aspect greater than the remainder of the background.  Hypermetabolic mediastinal and hilar adenopathy following in the range of malignancy.      Mr. Steven has had worsening constipation since his surgery last year he denies of any blood in the stool. He denies of any weight changes, shortness of breath, abdominal pain.      The following portions of the patient's history were reviewed and updated as appropriate: allergies, current medications, past family history, past medical history, past social history, past surgical history and problem list.    Allergies   Allergen Reactions   • Aspirin GI Intolerance   • Nsaids GI Intolerance       Past Medical History:   Diagnosis Date   • Anxiety disorder    • Arthritis    • Colon polyps    • Depression    • Diverticulitis    • Fibromyalgia    • High blood pressure    • High cholesterol    • High triglycerides    • Panic attacks    • Thyroid disorder    Fibromylagia       Past Surgical History:   Procedure Laterality Date   • CHOLECYSTECTOMY  2017   • COLON RESECTION N/A 8/29/2023    Procedure: ILEOCOLIC RESECTION  LAPAROSCOPIC;  Surgeon: Guido Etienne MD;  Location: [facility];  Service: General;  Laterality: N/A;   • COLONOSCOPY     • LARYNX SURGERY     • NECK SURGERY      disc fusion         Social History     Socioeconomic History   • Marital status:    Tobacco Use   • Smoking status: Never   • Smokeless tobacco: Never   Vaping Use   • Vaping status: Never Used   Substance and Sexual Activity   • Alcohol use: No   • Drug use: No   • Sexual activity: Defer         Family History   Problem Relation Age of Onset   • Breast cancer Sister    • Prostate cancer Brother    • Heart disease Brother            Current Outpatient Medications:   •  Linzess 72 MCG capsule capsule, Take 1 capsule by mouth Every Morning Before Breakfast., Disp: , Rfl:   •  ALPRAZolam (XANAX) 1 MG tablet, Take 1 tablet by  mouth 3 (Three) Times a Day As Needed., Disp: , Rfl: 5  •  amLODIPine (NORVASC) 2.5 MG tablet, Take 1 tablet by mouth Daily., Disp: , Rfl: 1  •  cetirizine (zyrTEC) 10 MG tablet, Take 1 tablet by mouth Daily As Needed., Disp: , Rfl: 3  •  Cholecalciferol (Vitamin D3) 50 MCG (2000 UT) capsule, Take 1 capsule by mouth Daily., Disp: , Rfl:   •  cycloSPORINE (RESTASIS) 0.05 % ophthalmic emulsion, 1 drop 2 (Two) Times a Day., Disp: , Rfl:   •  escitalopram (LEXAPRO) 20 MG tablet, Take 2 tablets by mouth Every Morning., Disp: , Rfl: 5  •  fluticasone (VERAMYST) 27.5 MCG/SPRAY nasal spray, Administer 2 sprays into the nostril(s) as directed by provider Daily., Disp: , Rfl:   •  levothyroxine sodium (TIROSINT) 50 MCG capsule, Take 1 capsule by mouth Every Morning., Disp: , Rfl: 1  •  OLANZapine zydis (zyPREXA) 20 MG disintegrating tablet, Place 1 tablet on the tongue Every Night., Disp: , Rfl:   •  polyethylene glycol (MIRALAX) powder, Take 17 g by mouth Daily As Needed., Disp: , Rfl: 5  •  pravastatin (PRAVACHOL) 20 MG tablet, Take 1 tablet by mouth Every Night., Disp: , Rfl: 1  •  pregabalin (LYRICA) 100 MG capsule, Take 1 capsule by mouth 2 (Two) Times a Day., Disp: , Rfl:   •  traMADol (ULTRAM) 50 MG tablet, Take 1 tablet by mouth Daily As Needed for Moderate Pain., Disp: , Rfl:   •  ZETIA 10 MG tablet, Take 1 tablet by mouth Every Night., Disp: , Rfl: 1  •  zolpidem (AMBIEN) 10 MG tablet, 1 tablet At Night As Needed., Disp: , Rfl: 5          Review of Systems  Constitutional: No fever, chills, night sweats or weight loss.   HEENT:  No headaches, vision changes or hearing changes, no sinus drainage, sore throat.   Cardiovascular:  No palpitations, chest pain, syncopal episodes or edema.   Pulmonary:  No shortness of breath, hemoptysis, or cough.   Gastrointestinal:  No nausea or vomiting.  No constipation or diarrhea. No abdominal pain. No melena or hematochezia.   Genitourinary:  No hematuria, or changes in urination.    Musculoskeletal:  No pain, or joint problems.   Skin: No rashes or pruritus.   Endocrine:  No hot flashes or chills   Hematologic:  No history of free bleeding, spontaneous bleeding or clotting problems. No lymphadenopathy.    Immunologic:  No allergies or frequent infections.   Neurologic: No numbness, tingling, or weakness.   Psychiatric:  Positive anxiety or depression.         Physical Exam  Vital Signs: These were reviewed and listed as per patient’s electronic medical chart  Vitals:    10/21/24 1254   BP: 93/62   Pulse: 82   Resp: 18   Temp: 97.5 °F (36.4 °C)   SpO2: 96%     General: Awake, alert and oriented, in no distress  HEENT: Head is atraumatic, normocephalic, extraocular movements full, no scleral icterus  Neck: supple, no jvd, lymphadenopathy or masses  Cardiovascular: regular rate and rhythm without murmurs, rubs or gallops  Pulmonary: non-labored, clear to auscultation bilaterally, no wheezing  Abdomen: soft, non-tender, non-distended, normal active bowel sounds present, no organomegaly  Extremities: No clubbing, cyanosis or edema  Lymph: No cervical, supraclavicular adenopathy  Neurologic: Mental status as above, alert, awake and oriented, grossly non-focal exam  Skin: warm, dry, intact        Pain Score:  Pain Score    10/21/24 1254   PainSc: 0-No pain           PHQ-Score Total:  PHQ-9 Total Score:          PROCEDURES:  7/14/17 6/23/23 7/26/23 6/28/24            IMAGING:  CT Abdomen Pelvis With Contrast (08/18/2023 15:56)   Findings:LUNG BASES:  Unremarkable without mass or infiltrate.LIVER:  Unremarkable parenchyma without focal lesion. BILIARY/GALLBLADDER: Cholecystectomy  SPLEEN:  Unremarkable. PANCREAS:  Unremarkable. ADRENAL:  Unremarkable.KIDNEYS:  Unremarkable parenchyma with no solid mass identified. No obstruction.  No calculus identified.GASTROINTESTINAL/MESENTERY:  No evidence of obstruction nor inflammation.  There is a 3.8 x 2.3 cm masslike area within the posterior  inferior sacrum near the appendiceal orifice presumably representing patient's cecal mass (image 86, series 2).MESENTERIC VESSELS:  Patent.AORTA/IVC:  Normal caliber.RETROPERITONEUM/LYMPH NODES:  Unremarkable. REPRODUCTIVE:  Unremarkable. BLADDER:  Unremarkable. OSSEUS STRUCTURES:  Typical for age with no acute process identified.  IMPRESSION: 1.No evidence of metastatic disease within the abdomen or pelvis.2.Vague cecal masslike area presumably representing patient's primary diagnosis.     CT Abdomen Pelvis With Contrast (05/01/2024 13:59)   FINDINGS:ABDOMEN: There is mild right basilar atelectasis. The gallbladder is absent. There is no hydronephrosis or nephrolithiasis. The solid organs are otherwise unremarkable. There is no free fluid or adenopathy.PELVIS: There are postoperative changes in the region of the cecum. There is marked surrounding fluid, inflammation, stranding and possible soft tissue. There is a tiny amount of extraluminal air in this region. The urinary bladder is unremarkable. There is no adenopathy.   IMPRESSION: Markedly abnormal appearance of the cecum. There are postoperative changes in this region. Surrounding fluid and stranding is present. A tiny amount of extraluminal air is noted. This may be postoperative in nature. Alternatively, this may be inflammatory, infectious or neoplastic in nature.     CT Abdomen Pelvis With Contrast (07/05/2024 20:51)   FINDINGS: LOWER CHEST: The heart is normal in size. Basal atelectasis, right greater than left..ABDOMEN/PELVIS: Liver, gallbladder and bile ducts: The liver enhances homogenously without suspicious focal hepatic lesion. Prior cholecystectomy. No significant biliary ductal dilatation.Adrenal glands: The adrenal glands are morphologically unremarkable without suspicious lesion.Kidneys, ureters and urinary bladder: No suspicious renal lesions. No hydronephrosis. Unremarkable urinary   bladder. Spleen: The spleen is normal in size. Pancreas: The  pancreas is unremarkable. Gastrointestinal system and mesentery: There is no evidence of bowel obstruction. Postoperative changes of the sacrum are again noted. Appendix is surgically absent. Moderate abnormal appearance of the cecum is again noted with moderate surrounding inflammatory changes, moderate surrounding fluid and mild mural thickening of the colonic wall within this region. Findings are suggestive of acute severe diverticulitis/focal colitis. No definite evidence to suggest perforation. There is increased gaseous distention seen throughout the large and small bowel suggesting a component of ileus. There is a hyperenhancing soft tissue nodule adjacent to this region of inflammatory change measuring 8 mm which may reflect a reactive mesenteric lymph node (series 2, image 53). Neoplastic involvement is not excluded. No significant mesenteric inflammation. Lymph nodes: No pathologically enlarged abdominal or pelvic lymph nodes are present. Vessels: The abdominal aorta is normal in caliber. The celiac trunk, superior mesenteric artery, inferior mesenteric artery and their branch vessels appear grossly patent. The superior mesenteric vein, splenic vein and main portal veins are patent. The inferior vena cava and hepatic veins   are unremarkable. Peritoneum: No free intraperitoneal fluid or pneumoperitoneum. Pelvic viscera: No acute findings. Body wall: No acute findings. No significant body wall hernias. Bones: No acute fracture.   IMPRESSION: Similar appearance to the cecum and adjacent large bowel loops on today's study with appearance suggestive of acute focal diverticulitis/colitis. Significant surrounding fluid and inflammatory change. No definite evidence of gross perforation. Adjacent 8 mm soft tissue density may reflect a reactive lymph node, however, neoplastic metastatic theo involvement is not excluded. Increased gaseous distention of the bowel on today's study appears most consistent with a  component of ileus.     CT Abdomen Pelvis With Contrast (09/03/2024 16:04)   FINDINGS: There are dependent densities in the lung bases consistent with atelectasis. The gallbladder is surgically absent. There is no biliary ductal dilation. The spleen, adrenal glands, and pancreas are unremarkable. The kidneys demonstrate no stones or hydronephrosis. There is a lower pole right renal 1 cm hypodensity most consistent with a cyst. This is unchanged from the prior study. There are trace vascular calcifications of the aorta. There is no aneurysm. The urinary bladder is unremarkable. The GI tract demonstrates no obstruction. There is increased gas within the stomach. The small bowel is unremarkable. There is no bowel obstruction. The patient is status post ileocecectomy with anastomosis. Significant inflammatory changes seen on the prior study have resolved. A previously described small enhancing nodule medial to the anastomosis now measures 11 x 11 mm and previously measured 8 x 8.5 mm. This measures less than 5 mm on a study from 5 months prior. This   has essentially doubled in 5 months which is concerning for malignancy. A reactive/inflammatory node is still considered in the differential. There is no other mesenteric or retroperitoneal adenopathy identified. There is moderate pandiverticulosis predominantly involving the ascending, transverse, and descending colon. There is sparing of the sigmoid colon. There is no free air, free fluid or inflammatory process.   IMPRESSION: 1. Resolved inflammatory changes in the right lower quadrant. 2. No obstruction. 3. Moderate pandiverticulosis without diverticulitis. 4. Interval increase in size of a small enhancing nodule medial to the ileocolic anastomosis. This now measures 11 x 11 mm and measured less than 5 mm on the study from 5 months prior. This is consistent with doubling over 5 months. This is concerning for malignancy. 5. There is no free air, free fluid or  inflammatory process.     NM PET/CT Skull Base to Mid Thigh (10/16/2024 11:15)   FINDINGS:HEAD:Brain and extra-axial spaces:  Unremarkable as visualized.Nasopharynx:  Unremarkable as visualized.NECK:Hypopharynx:  Unremarkable as visualized.Larynx:  Unremarkable as visualized.Trachea:  Unremarkable as visualized.Retropharyngeal space:  Unremarkable as visualized.Submandibular/parotid glands:  Unremarkable as visualized.  Glands are normal in size.  Thyroid:  Unremarkable as visualized.  No enlarged or calcified nodules. CHEST:Lungs and pleural spaces:  Unremarkable as visualized.  No mass.  No consolidation.  No significant effusion.  No pneumothorax.  Heart:  Unremarkable as visualized.  No cardiomegaly.  No significant pericardial effusion.  Mediastinum:  Unremarkable as visualized.  No mass. ABDOMEN:  Liver:  Focus of relatively increased FDG hypermetabolic activity  involving the right posterior hepatic lobe central aspect greater than the remainder of background hepatic FDG hypermetabolic activity with maximum SUV: 4.0. Consider further evaluation with dedicated liver imaging either CT, MRI, or ultrasound.Gallbladder and bile ducts:  Unremarkable as visualized.  No calcified stones.  No ductal dilation.  Pancreas:  Unremarkable as visualized.  No ductal dilation.  Spleen:  Unremarkable as visualized.  No splenomegaly.  Adrenals:  Unremarkable as visualized.  No mass.  Kidneys and ureters:  Unremarkable as visualized.  Stomach and bowel:  Right hemicolectomy changes are noted.  Colonic diverticulosis without diverticulitis.  No obstruction.  No FDG hypermetabolic masses or nodules are seen in the area of ileocolic anastomosis.PELVIS: Appendix:  See above. Bladder:  Unremarkable as visualized. Reproductive:  Prostate enlargement.WHOLE BODY: Intraperitoneal space:  Unremarkable as visualized.  No significant fluid collection.  No free air.  Bones/joints:  Unremarkable as visualized.  No acute fracture.  No  dislocation.  Soft tissues:  Unremarkable as visualized.  Vasculature:  Unremarkable as visualized.  No aortic aneurysm.  Lymph nodes:  1.5 cm left infrahilar lymph node or central pulmonary nodule with marked FDG hypermetabolism and maximum SUV: 7.1.  Left  suprahilar node with FDG hypermetabolism and maximum SUV: 6.2.  A right middle hilar node with FDG hypermetabolism with maximum SUV: 4.4.  No FDG hypermetabolic abdominal or pelvic adenopathy.  IMPRESSION:1.  No FDG hypermetabolic masses or nodules are seen in the area of ileocolic anastomosis.2.  No FDG hypermetabolic abdominal or pelvic adenopathy.  3.  Right hemicolectomy changes are noted.4.  Focus of relatively increased FDG hypermetabolic activity involving the right posterior hepatic lobe central aspect greater than the remainder of background hepatic FDG hypermetabolic activity with maximum SUV: 4.0. Consider further evaluation with dedicated liver imaging either CT, MRI, or ultrasound. 5. FDG hypermetabolic mediastinal and hilar adenopathy with maximum SUV values that fall within the range of malignancy. 6. 1.5 cm left infrahilar lymph node or central pulmonary nodule with marked FDG hypermetabolism and maximum SUV: 7.1.7.  Left suprahilar node with FDG hypermetabolism and maximum SUV: 6.2.  8.  A right middle hilar node with FDG hypermetabolism with maximum SUV:4.4.        LABS/STUDIES:   No visits with results within 6 Month(s) from this visit.   Latest known visit with results is:   Admission on 08/29/2023, Discharged on 08/31/2023   Component Date Value   • ABO Type 08/29/2023 O    • RH type 08/29/2023 Positive    • Antibody Screen 08/29/2023 Negative    • T&S Expiration Date 08/29/2023 9/1/2023 11:59:59 PM    • Case Report 08/29/2023                      Value:Surgical Pathology Report                         Case: [id number]                                  Authorizing Provider:  Guido Etienne MD         Collected:           08/29/2023 03:47 PM           Ordering Location:     [facility]   Received:            08/30/2023 06:41 AM                                 OR                                                                           Pathologist:           Artemio Mckeon MD                                                       Specimen:    Large Intestine, Right / Ascending Colon, RIGHT ILIEOCOLIC RESECTION                      • Clinical Information 08/29/2023                      Value:This result contains rich text formatting which cannot be displayed here.   • Final Diagnosis 08/29/2023                      Value:This result contains rich text formatting which cannot be displayed here.   • Gross Description 08/29/2023                      Value:This result contains rich text formatting which cannot be displayed here.   • Microscopic Description 08/29/2023                      Value:This result contains rich text formatting which cannot be displayed here.   • Glucose 08/30/2023 144 (H)    • BUN 08/30/2023 15    • Creatinine 08/30/2023 0.71 (L)    • Sodium 08/30/2023 139    • Potassium 08/30/2023 4.3    • Chloride 08/30/2023 107    • CO2 08/30/2023 27.0    • Calcium 08/30/2023 7.6 (L)    • BUN/Creatinine Ratio 08/30/2023 21.1    • Anion Gap 08/30/2023 5.0    • eGFR 08/30/2023 95.1    • WBC 08/30/2023 7.40    • RBC 08/30/2023 3.38 (L)    • Hemoglobin 08/30/2023 10.8 (L)    • Hematocrit 08/30/2023 32.6 (L)    • MCV 08/30/2023 96.4    • MCH 08/30/2023 32.0    • MCHC 08/30/2023 33.1    • RDW 08/30/2023 12.8    • RDW-SD 08/30/2023 45.4    • MPV 08/30/2023 12.4 (H)    • Platelets 08/30/2023 143    • Neutrophil % 08/30/2023 84.8 (H)    • Lymphocyte % 08/30/2023 8.9 (L)    • Monocyte % 08/30/2023 5.9    • Eosinophil % 08/30/2023 0.0 (L)    • Basophil % 08/30/2023 0.0    • Immature Grans % 08/30/2023 0.4    • Neutrophils, Absolute 08/30/2023 6.27    • Lymphocytes, Absolute 08/30/2023 0.66 (L)    • Monocytes, Absolute 08/30/2023 0.44    •  Eosinophils, Absolute 08/30/2023 0.00    • Basophils, Absolute 08/30/2023 0.00    • Immature Grans, Absolute 08/30/2023 0.03    • nRBC 08/30/2023 0.0    • WBC 08/30/2023 10.90 (H)    • RBC 08/30/2023 3.05 (L)    • Hemoglobin 08/30/2023 10.0 (L)    • Hematocrit 08/30/2023 29.6 (L)    • MCV 08/30/2023 97.0    • MCH 08/30/2023 32.8    • MCHC 08/30/2023 33.8    • RDW 08/30/2023 12.9    • RDW-SD 08/30/2023 45.0    • MPV 08/30/2023 11.9    • Platelets 08/30/2023 143    • Neutrophil % 08/30/2023 76.6 (H)    • Lymphocyte % 08/30/2023 13.7 (L)    • Monocyte % 08/30/2023 9.1    • Eosinophil % 08/30/2023 0.0 (L)    • Basophil % 08/30/2023 0.1    • Immature Grans % 08/30/2023 0.5    • Neutrophils, Absolute 08/30/2023 8.36 (H)    • Lymphocytes, Absolute 08/30/2023 1.49    • Monocytes, Absolute 08/30/2023 0.99 (H)    • Eosinophils, Absolute 08/30/2023 0.00    • Basophils, Absolute 08/30/2023 0.01    • Immature Grans, Absolute 08/30/2023 0.05    • nRBC 08/30/2023 0.0    • WBC 08/31/2023 8.81    • RBC 08/31/2023 2.98 (L)    • Hemoglobin 08/31/2023 9.6 (L)    • Hematocrit 08/31/2023 29.4 (L)    • MCV 08/31/2023 98.7 (H)    • MCH 08/31/2023 32.2    • MCHC 08/31/2023 32.7    • RDW 08/31/2023 13.2    • RDW-SD 08/31/2023 47.3    • MPV 08/31/2023 11.5    • Platelets 08/31/2023 150    • Neutrophil % 08/31/2023 74.1    • Lymphocyte % 08/31/2023 18.7 (L)    • Monocyte % 08/31/2023 6.4    • Eosinophil % 08/31/2023 0.0 (L)    • Basophil % 08/31/2023 0.3    • Immature Grans % 08/31/2023 0.5    • Neutrophils, Absolute 08/31/2023 6.53    • Lymphocytes, Absolute 08/31/2023 1.65    • Monocytes, Absolute 08/31/2023 0.56    • Eosinophils, Absolute 08/31/2023 0.00    • Basophils, Absolute 08/31/2023 0.03    • Immature Grans, Absolute 08/31/2023 0.04    • nRBC 08/31/2023 0.0          PATHOLOGY:   8/29/23              Assessment & Plan  Memo Steven is a very pleasant 79 y.o.  male who presents in new consultation at the request of Dr. Lora  Lowell for further management and evaluation of colon adenocarcinoma (Stage IIB, T4a, N0).    Colon adenocarcinoma  Mediastinal/hilar adenopathy  - Patient was diagnosed with a T4a, N0 colon adenocarcinoma in August 2023 after he had a right ileocolic resection performed by Dr. Etienne. At the end of April 2024 he began experiencing recurrent abdominal pain that was felt to be related to diverticulitis and would resolve with antibiotic treatment. He also had a repeat colonoscopy in June 2024 with Dr. Etienne that did not reveal any evidence of malignancy. However repeat CT imaging in September 2024 showed an interval increase in size in nodules that are medial to the ileocolic anastomosis, recently measuring 11 x 11 mm and doubling over 5 months which was felt to be concerning for malignancy. PET/CT obtained October 2024 did not reveal any hypermetabolic masses or nodules seen in the area of the iliac colic anastomosis and no hypermetabolic abdominal or pelvic adenopathy changes were seen. However there was increased hypermetabolic mediastinal and hilar adenopathy following in the range of malignancy.    - Given CT findings will obtain MRI imaging to further evaluate nodules seen near anastomosis site. Will also place pulmonary referral for bronchoscopy to further evaluate hypermetabolic activity seen in the chest. CEA pending.      ACO / KAYKAY/Other  Quality measures  -  Memo Steven  reports that he has never smoked. He has never used smokeless tobacco.   -  Memo Steven received 2024 flu vaccine.  -  Memo Steven reports a pain score of 0.  Given his pain assessment as noted, treatment options were discussed and the following options were decided upon as a follow-up plan to address the patient's pain: continuation of current treatment plan for pain.  -  Current outpatient and discharge medications have been reconciled for the patient.  Reviewed by: Ila Monterroso MD      I will have the patient return in follow up  appointment to review test results in two weeks. He understands that should he have any questions or concerns prior to his appointment he should give us a call at any time and I would be happy to see him sooner. It was a pleasure to see this patient in clinic today, thank you for allowing me to participate in the care of this patient.    I spent 60 minutes caring for patient on this date of service. This time includes time spent by me in the following activities: preparing for the visit, reviewing tests, obtaining and/or reviewing a separately obtained history, performing a medically appropriate examination and/or evaluation, counseling and educating the patient/family/caregiver, ordering medications, tests, or procedures, documenting information in the medical record, independently interpreting results and communicating that information with the patient/family/caregiver, and care coordination as well as answering any questions.      Ila Monterroso MD   10/21/24   13:15 EDT

## 2024-10-22 LAB — CEA SERPL-MCNC: 1.56 NG/ML

## 2024-10-29 ENCOUNTER — PATIENT ROUNDING (BHMG ONLY) (OUTPATIENT)
Dept: ONCOLOGY | Facility: CLINIC | Age: 79
End: 2024-10-29
Payer: MEDICARE

## 2024-11-02 ENCOUNTER — HOSPITAL ENCOUNTER (OUTPATIENT)
Dept: MRI IMAGING | Facility: HOSPITAL | Age: 79
Discharge: HOME | End: 2024-11-02
Payer: MEDICARE

## 2024-11-02 DIAGNOSIS — C18.9 MALIGNANT NEOPLASM OF COLON, UNSPECIFIED PART OF COLON: ICD-10-CM

## 2024-11-02 PROCEDURE — 0 GADOBENATE DIMEGLUMINE 529 MG/ML SOLUTION: Performed by: INTERNAL MEDICINE

## 2024-11-02 PROCEDURE — 72197 MRI PELVIS W/O & W/DYE: CPT

## 2024-11-02 PROCEDURE — A9577 INJ MULTIHANCE: HCPCS | Performed by: INTERNAL MEDICINE

## 2024-11-02 PROCEDURE — 74183 MRI ABD W/O CNTR FLWD CNTR: CPT

## 2024-11-02 RX ADMIN — GADOBENATE DIMEGLUMINE 13 ML: 529 INJECTION, SOLUTION INTRAVENOUS at 15:44

## 2024-11-06 NOTE — PROGRESS NOTES
Memo Steven  [id number]  1945 11/8/2024      Referring Provider:   Guido Etienne MD    Reason for Follow Up:   Colon Adenocarcinoma (Stage IIB, T4a, N0)      Chief Complaint:  Colon Adenocarcinoma (Stage IIB, T4a, N0)       History of Present Illness   Memo Steven is a very pleasant 79 y.o.  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    7/26/23: Ileocolonoscopy: Inverted mucosa at the appendix biopsy to evaluate for potential atypia, sessile serrated tissue demonstrated on previous biopsy fungating from the base of the cecum and extending in proximity to the ileocecal valve.  Cecal neoplasia with recommendation for ileocecal resection to remove for diagnostic and therapeutic benefit  8/18/23: CT A/P: No evidence of obstruction nor inflammation.  There is a 3.8 x 2.3 cm masslike area within the posterior inferior sacrum near the appendiceal orifice presumably representing patient's cecal mass, otherwise no metastatic            disease seen.  8/28/23: Right ileocolic resection: Invasive moderately differentiated adenocarcinoma extending to the serosal surface, pT4a, 0/16 lymph nodes negative, surgical margins negative. Tumor size: 2.2 cm, G2 moderately differentiated, invades visceral peritoneum, tumor perforation not identified, lymph-vascular invasion not identified, perineural invasion not identified. Distance from closest proximal mucosal margin is 4 cm. Large sessile serrated adenoma, negative for dysplasia or carcinoma, multiple benign lymph nodes, surgical margins are free of dysplasia.  5/1/24: CT A/P due to worsening abdominal pain showed markedly abnormal appearance of the cecum. Tiny amount of extraluminal air in this region. There are postoperative changes in this region. He was evaluated by Dr. Paul with surgery who recommended outpatient management and was prescribed Augmentin for diverticulitis with  "close follow up.   6/28/24: Ileocolonoscopy: No evidence of neoplasia in the ileocolic region noting CT with \"abnormal appearance of the cecum\" and previous resection of T4 N0 cancer.  Dense diverticular changes in the left and sigmoid colon.  Internal hemorrhoids.  7/5/24: Was evaluated in the ED for abdominal pain. CT A/P showed a moderate abnormal appearance of the cecum is again noted with moderate surrounding inflammatory changes, fluid, and mild mural colon wall thickening suggestive of acute severe diverticulitis/focal colitis. Increased gaseous distention seen throughout the large and small bowel suggesting a component of ileus, there is hyper enhancing soft tissue nodule adjacent to this region of inflammatory change measuring 8 mm which may reflect a reactive mesenteric lymph node. He was treated with Augmentin for diverticulitis.   9/3/24: CT A/P: Interval increase in size of small enhancing nodule medial to the ileocolic anastomosis.  This now measures 11 x 11 mm and measured less than 5 mm on the study from 5 months prior.  This is consistent with doubling over 5 months and concerning for malignancy.  10/16/24: PET/CT: No hypermetabolic masses or nodule seen in the area of the iliac colic anastomosis no hypermetabolic abdominal or pelvic adenopathy and right hemicolectomy changes seen. Relatively increased hypermetabolic activity involving the right posterior hepatic lobe central aspect greater than the remainder of the background.  Hypermetabolic mediastinal and hilar adenopathy following in the range of malignancy.      Mr. Steven has had worsening constipation since his surgery last year he denies of any blood in the stool. He denies of any weight changes, shortness of breath, abdominal pain.      Interim History:  Mr. Steven denies of any changes since his last visit.            The following portions of the patient's history were reviewed and updated as appropriate: allergies, current medications, " past family history, past medical history, past social history, past surgical history and problem list.    Allergies   Allergen Reactions    Aspirin GI Intolerance    Nsaids GI Intolerance       Past Medical History:   Diagnosis Date    Anxiety disorder     Arthritis     Colon polyps     Depression     Diverticulitis     Fibromyalgia     High blood pressure     High cholesterol     High triglycerides     Panic attacks     Thyroid disorder    Fibromylagia       Past Surgical History:   Procedure Laterality Date    CHOLECYSTECTOMY  2017    COLON RESECTION N/A 8/29/2023    Procedure: ILEOCOLIC RESECTION  LAPAROSCOPIC;  Surgeon: Guido Etienne MD;  Location: [facility];  Service: General;  Laterality: N/A;    COLONOSCOPY      LARYNX SURGERY      NECK SURGERY      disc fusion         Social History     Socioeconomic History    Marital status:    Tobacco Use    Smoking status: Never    Smokeless tobacco: Never   Vaping Use    Vaping status: Never Used   Substance and Sexual Activity    Alcohol use: No    Drug use: No    Sexual activity: Defer         Family History   Problem Relation Age of Onset    Breast cancer Sister     Prostate cancer Brother     Heart disease Brother            Current Outpatient Medications:     ALPRAZolam (XANAX) 1 MG tablet, Take 1 tablet by mouth 3 (Three) Times a Day As Needed., Disp: , Rfl: 5    amLODIPine (NORVASC) 2.5 MG tablet, Take 1 tablet by mouth Daily., Disp: , Rfl: 1    cetirizine (zyrTEC) 10 MG tablet, Take 1 tablet by mouth Daily As Needed., Disp: , Rfl: 3    Cholecalciferol (Vitamin D3) 50 MCG (2000 UT) capsule, Take 1 capsule by mouth Daily., Disp: , Rfl:     cycloSPORINE (RESTASIS) 0.05 % ophthalmic emulsion, 1 drop 2 (Two) Times a Day., Disp: , Rfl:     escitalopram (LEXAPRO) 20 MG tablet, Take 2 tablets by mouth Every Morning., Disp: , Rfl: 5    fluticasone (VERAMYST) 27.5 MCG/SPRAY nasal spray, Administer 2 sprays into the nostril(s) as directed by provider Daily.,  Disp: , Rfl:     levothyroxine sodium (TIROSINT) 50 MCG capsule, Take 1 capsule by mouth Every Morning., Disp: , Rfl: 1    Linzess 72 MCG capsule capsule, Take 1 capsule by mouth Every Morning Before Breakfast., Disp: , Rfl:     OLANZapine zydis (zyPREXA) 20 MG disintegrating tablet, Place 1 tablet on the tongue Every Night., Disp: , Rfl:     polyethylene glycol (MIRALAX) powder, Take 17 g by mouth Daily As Needed., Disp: , Rfl: 5    pravastatin (PRAVACHOL) 20 MG tablet, Take 1 tablet by mouth Every Night., Disp: , Rfl: 1    pregabalin (LYRICA) 100 MG capsule, Take 1 capsule by mouth 2 (Two) Times a Day., Disp: , Rfl:     traMADol (ULTRAM) 50 MG tablet, Take 1 tablet by mouth Daily As Needed for Moderate Pain., Disp: , Rfl:     ZETIA 10 MG tablet, Take 1 tablet by mouth Every Night., Disp: , Rfl: 1    zolpidem (AMBIEN) 10 MG tablet, 1 tablet At Night As Needed., Disp: , Rfl: 5          Review of Systems  Pertinent positives are listed as per history of present of illness, all other systems reviewed and are negative.        Physical Exam  Vital Signs: These were reviewed and listed as per patient’s electronic medical chart  Vitals:    11/08/24 1253   BP: 102/70   Pulse: 82   Resp: 18   Temp: 97.1 °F (36.2 °C)   SpO2: 97%   General: Patient is awake, alert, and in no acute distress.  Head: Normocephalic, atraumatic  Eyes: EOMI. Conjunctivae and sclerae normal.  Ears: Ears appear intact with no abnormalities noted.   Neck: Trachea midline. No obvious JVD.  Lungs: Respirations appear to be regular, even and unlabored with no signs of respiratory distress. No audible wheezing.  Abdomen: No obvious abdominal distension.  MS: Muscle tone appears normal. No gross deformities.  Extremities: No clubbing, cyanosis or edema noted.  Skin: No visible bleeding, bruising, or rash.  Neurologic: Alert and oriented x3. No gross focal deficits.              Pain Score:  Pain Score    11/08/24 1253   PainSc: 0-No pain              PHQ-Score Total:  PHQ-9 Total Score:          PROCEDURES:  7/14/17 6/23/23 7/26/23 6/28/24            IMAGING:  CT Abdomen Pelvis With Contrast (08/18/2023 15:56)   Findings:LUNG BASES:  Unremarkable without mass or infiltrate.LIVER:  Unremarkable parenchyma without focal lesion. BILIARY/GALLBLADDER: Cholecystectomy  SPLEEN:  Unremarkable. PANCREAS:  Unremarkable. ADRENAL:  Unremarkable.KIDNEYS:  Unremarkable parenchyma with no solid mass identified. No obstruction.  No calculus identified.GASTROINTESTINAL/MESENTERY:  No evidence of obstruction nor inflammation.  There is a 3.8 x 2.3 cm masslike area within the posterior inferior sacrum near the appendiceal orifice presumably representing patient's cecal mass (image 86, series 2).MESENTERIC VESSELS:  Patent.AORTA/IVC:  Normal caliber.RETROPERITONEUM/LYMPH NODES:  Unremarkable. REPRODUCTIVE:  Unremarkable. BLADDER:  Unremarkable. OSSEUS STRUCTURES:  Typical for age with no acute process identified.  IMPRESSION: 1.No evidence of metastatic disease within the abdomen or pelvis.2.Vague cecal masslike area presumably representing patient's primary diagnosis.     CT Abdomen Pelvis With Contrast (05/01/2024 13:59)   FINDINGS:ABDOMEN: There is mild right basilar atelectasis. The gallbladder is absent. There is no hydronephrosis or nephrolithiasis. The solid organs are otherwise unremarkable. There is no free fluid or adenopathy.PELVIS: There are postoperative changes in the region of the cecum. There is marked surrounding fluid, inflammation, stranding and possible soft tissue. There is a tiny amount of extraluminal air in this region. The urinary bladder is unremarkable. There is no adenopathy.   IMPRESSION: Markedly abnormal appearance of the cecum. There are postoperative changes in this region. Surrounding fluid and stranding is present. A tiny amount of extraluminal air is noted. This may be postoperative in nature. Alternatively, this may be  inflammatory, infectious or neoplastic in nature.     CT Abdomen Pelvis With Contrast (07/05/2024 20:51)   FINDINGS: LOWER CHEST: The heart is normal in size. Basal atelectasis, right greater than left..ABDOMEN/PELVIS: Liver, gallbladder and bile ducts: The liver enhances homogenously without suspicious focal hepatic lesion. Prior cholecystectomy. No significant biliary ductal dilatation.Adrenal glands: The adrenal glands are morphologically unremarkable without suspicious lesion.Kidneys, ureters and urinary bladder: No suspicious renal lesions. No hydronephrosis. Unremarkable urinary   bladder. Spleen: The spleen is normal in size. Pancreas: The pancreas is unremarkable. Gastrointestinal system and mesentery: There is no evidence of bowel obstruction. Postoperative changes of the sacrum are again noted. Appendix is surgically absent. Moderate abnormal appearance of the cecum is again noted with moderate surrounding inflammatory changes, moderate surrounding fluid and mild mural thickening of the colonic wall within this region. Findings are suggestive of acute severe diverticulitis/focal colitis. No definite evidence to suggest perforation. There is increased gaseous distention seen throughout the large and small bowel suggesting a component of ileus. There is a hyperenhancing soft tissue nodule adjacent to this region of inflammatory change measuring 8 mm which may reflect a reactive mesenteric lymph node (series 2, image 53). Neoplastic involvement is not excluded. No significant mesenteric inflammation. Lymph nodes: No pathologically enlarged abdominal or pelvic lymph nodes are present. Vessels: The abdominal aorta is normal in caliber. The celiac trunk, superior mesenteric artery, inferior mesenteric artery and their branch vessels appear grossly patent. The superior mesenteric vein, splenic vein and main portal veins are patent. The inferior vena cava and hepatic veins   are unremarkable. Peritoneum: No free  intraperitoneal fluid or pneumoperitoneum. Pelvic viscera: No acute findings. Body wall: No acute findings. No significant body wall hernias. Bones: No acute fracture.   IMPRESSION: Similar appearance to the cecum and adjacent large bowel loops on today's study with appearance suggestive of acute focal diverticulitis/colitis. Significant surrounding fluid and inflammatory change. No definite evidence of gross perforation. Adjacent 8 mm soft tissue density may reflect a reactive lymph node, however, neoplastic metastatic theo involvement is not excluded. Increased gaseous distention of the bowel on today's study appears most consistent with a component of ileus.     CT Abdomen Pelvis With Contrast (09/03/2024 16:04)   FINDINGS: There are dependent densities in the lung bases consistent with atelectasis. The gallbladder is surgically absent. There is no biliary ductal dilation. The spleen, adrenal glands, and pancreas are unremarkable. The kidneys demonstrate no stones or hydronephrosis. There is a lower pole right renal 1 cm hypodensity most consistent with a cyst. This is unchanged from the prior study. There are trace vascular calcifications of the aorta. There is no aneurysm. The urinary bladder is unremarkable. The GI tract demonstrates no obstruction. There is increased gas within the stomach. The small bowel is unremarkable. There is no bowel obstruction. The patient is status post ileocecectomy with anastomosis. Significant inflammatory changes seen on the prior study have resolved. A previously described small enhancing nodule medial to the anastomosis now measures 11 x 11 mm and previously measured 8 x 8.5 mm. This measures less than 5 mm on a study from 5 months prior. This   has essentially doubled in 5 months which is concerning for malignancy. A reactive/inflammatory node is still considered in the differential. There is no other mesenteric or retroperitoneal adenopathy identified. There is moderate  pandiverticulosis predominantly involving the ascending, transverse, and descending colon. There is sparing of the sigmoid colon. There is no free air, free fluid or inflammatory process.   IMPRESSION: 1. Resolved inflammatory changes in the right lower quadrant. 2. No obstruction. 3. Moderate pandiverticulosis without diverticulitis. 4. Interval increase in size of a small enhancing nodule medial to the ileocolic anastomosis. This now measures 11 x 11 mm and measured less than 5 mm on the study from 5 months prior. This is consistent with doubling over 5 months. This is concerning for malignancy. 5. There is no free air, free fluid or inflammatory process.     NM PET/CT Skull Base to Mid Thigh (10/16/2024 11:15)   FINDINGS:HEAD:Brain and extra-axial spaces:  Unremarkable as visualized.Nasopharynx:  Unremarkable as visualized.NECK:Hypopharynx:  Unremarkable as visualized.Larynx:  Unremarkable as visualized.Trachea:  Unremarkable as visualized.Retropharyngeal space:  Unremarkable as visualized.Submandibular/parotid glands:  Unremarkable as visualized.  Glands are normal in size.  Thyroid:  Unremarkable as visualized.  No enlarged or calcified nodules. CHEST:Lungs and pleural spaces:  Unremarkable as visualized.  No mass.  No consolidation.  No significant effusion.  No pneumothorax.  Heart:  Unremarkable as visualized.  No cardiomegaly.  No significant pericardial effusion.  Mediastinum:  Unremarkable as visualized.  No mass. ABDOMEN:  Liver:  Focus of relatively increased FDG hypermetabolic activity  involving the right posterior hepatic lobe central aspect greater than the remainder of background hepatic FDG hypermetabolic activity with maximum SUV: 4.0. Consider further evaluation with dedicated liver imaging either CT, MRI, or ultrasound.Gallbladder and bile ducts:  Unremarkable as visualized.  No calcified stones.  No ductal dilation.  Pancreas:  Unremarkable as visualized.  No ductal dilation.  Spleen:   Unremarkable as visualized.  No splenomegaly.  Adrenals:  Unremarkable as visualized.  No mass.  Kidneys and ureters:  Unremarkable as visualized.  Stomach and bowel:  Right hemicolectomy changes are noted.  Colonic diverticulosis without diverticulitis.  No obstruction.  No FDG hypermetabolic masses or nodules are seen in the area of ileocolic anastomosis.PELVIS: Appendix:  See above. Bladder:  Unremarkable as visualized. Reproductive:  Prostate enlargement.WHOLE BODY: Intraperitoneal space:  Unremarkable as visualized.  No significant fluid collection.  No free air.  Bones/joints:  Unremarkable as visualized.  No acute fracture.  No dislocation.  Soft tissues:  Unremarkable as visualized.  Vasculature:  Unremarkable as visualized.  No aortic aneurysm.  Lymph nodes:  1.5 cm left infrahilar lymph node or central pulmonary nodule with marked FDG hypermetabolism and maximum SUV: 7.1.  Left  suprahilar node with FDG hypermetabolism and maximum SUV: 6.2.  A right middle hilar node with FDG hypermetabolism with maximum SUV: 4.4.  No FDG hypermetabolic abdominal or pelvic adenopathy.  IMPRESSION:1.  No FDG hypermetabolic masses or nodules are seen in the area of ileocolic anastomosis.2.  No FDG hypermetabolic abdominal or pelvic adenopathy.  3.  Right hemicolectomy changes are noted.4.  Focus of relatively increased FDG hypermetabolic activity involving the right posterior hepatic lobe central aspect greater than the remainder of background hepatic FDG hypermetabolic activity with maximum SUV: 4.0. Consider further evaluation with dedicated liver imaging either CT, MRI, or ultrasound. 5. FDG hypermetabolic mediastinal and hilar adenopathy with maximum SUV values that fall within the range of malignancy. 6. 1.5 cm left infrahilar lymph node or central pulmonary nodule with marked FDG hypermetabolism and maximum SUV: 7.1.7.  Left suprahilar node with FDG hypermetabolism and maximum SUV: 6.2.  8.  A right middle hilar node  with FDG hypermetabolism with maximum SUV:4.4.    MRI Abdomen With & Without Contrast (11/02/2024 16:16)   FINDINGS:Lung bases:  Unremarkable as visualized.  No mass.  No consolidation.Liver:  Tiny T2 hyperintense lesion right posterior hepatic lobe that is 0.45 cm and is too small to characterize enhancement characteristics but demonstrates restricted diffusion with corresponding low signal on ADC map which increase suspicion for possible metastasis. The liver is otherwise normal throughout. Refer to axial DWI image #70; axial postcontrast sequence image #33; axial T2 sequence image #13.  Gallbladder and bile ducts:  Unremarkable as visualized.  No calcified stones.  No ductal dilation.  Pancreas:  Unremarkable as visualized.  No ductal dilation.  No mass.  Spleen:  Unremarkable as visualized.  No splenomegaly.  Adrenals:  Unremarkable as visualized.  No mass.  Kidneys and ureters:  Simple cyst right and left kidneys.  No  hydronephrosis.  Stomach and bowel:  Right hemicolectomy changes are noted.  Colonic diverticulosis without diverticulitis.  No obstruction.  Intraperitoneal space:  Unremarkable as visualized.  No significant fluid collection.  Soft tissues:  Unremarkable as visualized.  Vasculature:  Unremarkable as visualized.  No abdominal aortic  aneurysm.  Lymph nodes:  Unremarkable as visualized.  No enlarged lymph nodes.  IMPRESSION:1. Tiny T2 hyperintense lesion right posterior hepatic lobe that is 0.45 cm and is too small to characterize enhancement characteristics but demonstrates restricted diffusion with corresponding low signal on ADC map which increase suspicion for possible metastasis. The liver is otherwise normal throughout. Refer to axial DWI image #70; axial postcontrast sequence image #33; axial T2 sequence image #13. This localizes to the same hepatic segment noted with focus of hypermetabolism on previous PET/CT. 2. No evidence of adenopathy. Otherwise unremarkable exam.    MRI Pelvis With  & Without Contrast (11/02/2024 16:17)   FINDINGS:Bowel:  Pelvic portions of the GI tract appear within normal limits except for sigmoid diverticulosis. No diverticulitis.  No obstruction.  No mucosal thickening.  Intraperitoneal space:  Unremarkable as visualized.  No free air.  No free fluid.  Bladder:  Unremarkable as visualized.  No stones.  No mass.  Prostate:  Prostate enlargement with central hyperplasia constituting changes of BPH.  Seminal vesicles:  Unremarkable as visualized.  No nodule or cyst.  Bones/joints:  Unremarkable as visualized.  No dislocation.  No acute bony findings.  Soft tissues:  Right trochanteric bursitis and adjacent edema of the gluteus mary carmen muscle possibly low-grade strain.  Vasculature:  Unremarkable as visualized.  No lower abdominal aortic aneurysm.  Lymph nodes:  Unremarkable as visualized.  No iliac adenopathy identified.  No lower retroperitoneal adenopathy identified.  IMPRESSION:1.  No pelvic adenopathy identified based on MRI size criteria.2.  Prostate enlargement with central hyperplasia constituting changes of BPH.3.  Right trochanteric bursitis and adjacent edema of the gluteus mary carmen muscle possibly low-grade strain.4. Other incidental/nonacute findings above.        LABS/STUDIES:   Consult on 10/21/2024   Component Date Value    Glucose 10/21/2024 167 (H)     BUN 10/21/2024 17     Creatinine 10/21/2024 0.94     Sodium 10/21/2024 140     Potassium 10/21/2024 4.3     Chloride 10/21/2024 102     CO2 10/21/2024 26.9     Calcium 10/21/2024 9.3     Total Protein 10/21/2024 7.2     Albumin 10/21/2024 4.1     ALT (SGPT) 10/21/2024 14     AST (SGOT) 10/21/2024 17     Alkaline Phosphatase 10/21/2024 70     Total Bilirubin 10/21/2024 0.3     Globulin 10/21/2024 3.1     A/G Ratio 10/21/2024 1.3     BUN/Creatinine Ratio 10/21/2024 18.1     Anion Gap 10/21/2024 11.1     eGFR 10/21/2024 83.0     CEA 10/21/2024 1.56     C-Reactive Protein 10/21/2024 1.45 (H)     WBC 10/21/2024 7.42      RBC 10/21/2024 4.24     Hemoglobin 10/21/2024 13.8     Hematocrit 10/21/2024 40.7     MCV 10/21/2024 96.0     MCH 10/21/2024 32.5     MCHC 10/21/2024 33.9     RDW 10/21/2024 13.1     RDW-SD 10/21/2024 46.0     MPV 10/21/2024 11.2     Platelets 10/21/2024 198     Neutrophil % 10/21/2024 72.9     Lymphocyte % 10/21/2024 17.7 (L)     Monocyte % 10/21/2024 7.7     Eosinophil % 10/21/2024 0.5     Basophil % 10/21/2024 0.7     Immature Grans % 10/21/2024 0.5     Neutrophils, Absolute 10/21/2024 5.41     Lymphocytes, Absolute 10/21/2024 1.31     Monocytes, Absolute 10/21/2024 0.57     Eosinophils, Absolute 10/21/2024 0.04     Basophils, Absolute 10/21/2024 0.05     Immature Grans, Absolute 10/21/2024 0.04     nRBC 10/21/2024 0.0          PATHOLOGY:   8/29/23              Assessment & Plan   Memo Steven is a very pleasant 79 y.o.  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    Colon adenocarcinoma  Mediastinal/hilar adenopathy  - Patient was diagnosed with a T4a, N0 colon adenocarcinoma in August 2023 after he had a right ileocolic resection performed by Dr. Etienne. At the end of April 2024 he began experiencing recurrent abdominal pain that was felt to be related to diverticulitis and would resolve with antibiotic treatment. He also had a repeat colonoscopy in June 2024 with Dr. Etienne that did not reveal any evidence of malignancy. However repeat CT imaging in September 2024 showed an interval increase in size in nodules that are medial to the ileocolic anastomosis, recently measuring 11 x 11 mm and doubling over 5 months which was felt to be concerning for malignancy. PET/CT obtained October 2024 did not reveal any hypermetabolic masses or nodules seen in the area of the iliac colic anastomosis and no hypermetabolic abdominal or pelvic adenopathy changes were seen. However there was increased hypermetabolic mediastinal and  hilar adenopathy following in the range of malignancy.    - Given CT findings obtained MRI imaging to further evaluate nodules seen near anastomosis site. MRI significant for a tiny T2 hyperintense lesion right posterior hepatic lobe that is 0.45 cm and is too small to characterize suspicion for possible metastasis. The liver is otherwise normal throughout. I discussed imaging with radiology who felt that it was too small to biopsy. I offered patient referral to [facility] to assess whether this would be amiable to surgical resection however patient would prefer to monitor repeat imaging in 3 months. Also discussed with radiology and did not see any. enhancing nodules medial to the ileocolic anastomosis however recommended repeating CT imaging with oral and IV contrast in one month. Patient has been referred to pulmonary for possible bronchoscopy to further evaluate hypermetabolic activity seen in the chest. CEA is normal.      ACO / KAYKAY/Other  Quality measures  -  Memo Steven  reports that he has never smoked. He has never used smokeless tobacco.   -  Memo Steven received 2024 flu vaccine.  -  Memo Steven reports a pain score of 0.  Given his pain assessment as noted, treatment options were discussed and the following options were decided upon as a follow-up plan to address the patient's pain: continuation of current treatment plan for pain.  -  Current outpatient and discharge medications have been reconciled for the patient.  Reviewed by: Ila Monterroso MD      I will have the patient return in follow up appointment to review test results in three months pending imaging results. He understands that should he have any questions or concerns prior to his appointment he should give us a call at any time and I would be happy to see him sooner. It was a pleasure to see this patient in clinic today, thank you for allowing me to participate in the care of this patient.    I spent 40 minutes caring for patient on this  date of service. This time includes time spent by me in the following activities: preparing for the visit, reviewing tests, obtaining and/or reviewing a separately obtained history, performing a medically appropriate examination and/or evaluation, counseling and educating the patient/family/caregiver, ordering medications, tests, or procedures, documenting information in the medical record, independently interpreting results and communicating that information with the patient/family/caregiver, and care coordination as well as answering any questions.      Ila Monterroso MD   11/08/24   15:43 EST

## 2024-11-08 ENCOUNTER — OFFICE VISIT (OUTPATIENT)
Dept: ONCOLOGY | Facility: CLINIC | Age: 79
End: 2024-11-08
Payer: MEDICARE

## 2024-11-08 VITALS
SYSTOLIC BLOOD PRESSURE: 102 MMHG | BODY MASS INDEX: 22.84 KG/M2 | RESPIRATION RATE: 18 BRPM | HEART RATE: 82 BPM | TEMPERATURE: 97.1 F | WEIGHT: 150.2 LBS | DIASTOLIC BLOOD PRESSURE: 70 MMHG | OXYGEN SATURATION: 97 %

## 2024-11-08 DIAGNOSIS — C18.9 MALIGNANT NEOPLASM OF COLON, UNSPECIFIED PART OF COLON: ICD-10-CM

## 2024-11-08 DIAGNOSIS — R59.0 MEDIASTINAL ADENOPATHY: Primary | ICD-10-CM

## 2024-11-19 ENCOUNTER — OFFICE VISIT (OUTPATIENT)
Dept: PULMONOLOGY | Facility: CLINIC | Age: 79
End: 2024-11-19
Payer: MEDICARE

## 2024-11-19 ENCOUNTER — TELEPHONE (OUTPATIENT)
Dept: PULMONOLOGY | Facility: CLINIC | Age: 79
End: 2024-11-19
Payer: MEDICARE

## 2024-11-19 ENCOUNTER — LAB (OUTPATIENT)
Dept: LAB | Facility: HOSPITAL | Age: 79
End: 2024-11-19
Payer: MEDICARE

## 2024-11-19 VITALS
HEIGHT: 68 IN | SYSTOLIC BLOOD PRESSURE: 98 MMHG | OXYGEN SATURATION: 95 % | WEIGHT: 150 LBS | BODY MASS INDEX: 22.73 KG/M2 | DIASTOLIC BLOOD PRESSURE: 66 MMHG | HEART RATE: 68 BPM | TEMPERATURE: 97.7 F

## 2024-11-19 DIAGNOSIS — R59.0 MEDIASTINAL LYMPHADENOPATHY: Primary | ICD-10-CM

## 2024-11-19 DIAGNOSIS — D49.9 NEOPLASM OF UNSPECIFIED BEHAVIOR OF UNSPECIFIED SITE: ICD-10-CM

## 2024-11-19 LAB
ALBUMIN SERPL-MCNC: 4.2 G/DL (ref 3.5–5.2)
ALBUMIN/GLOB SERPL: 1.4 G/DL
ALP SERPL-CCNC: 60 U/L (ref 39–117)
ALT SERPL W P-5'-P-CCNC: 13 U/L (ref 1–41)
ANION GAP SERPL CALCULATED.3IONS-SCNC: 11.2 MMOL/L (ref 5–15)
APTT PPP: 26.5 SECONDS (ref 26.5–34.5)
AST SERPL-CCNC: 18 U/L (ref 1–40)
BASOPHILS # BLD AUTO: 0.05 10*3/MM3 (ref 0–0.2)
BASOPHILS NFR BLD AUTO: 0.6 % (ref 0–1.5)
BILIRUB SERPL-MCNC: 0.4 MG/DL (ref 0–1.2)
BUN SERPL-MCNC: 19 MG/DL (ref 8–23)
BUN/CREAT SERPL: 21.8 (ref 7–25)
CALCIUM SPEC-SCNC: 9.3 MG/DL (ref 8.6–10.5)
CHLORIDE SERPL-SCNC: 103 MMOL/L (ref 98–107)
CO2 SERPL-SCNC: 23.8 MMOL/L (ref 22–29)
CREAT SERPL-MCNC: 0.87 MG/DL (ref 0.76–1.27)
DEPRECATED RDW RBC AUTO: 46.6 FL (ref 37–54)
EGFRCR SERPLBLD CKD-EPI 2021: 88.3 ML/MIN/1.73
EOSINOPHIL # BLD AUTO: 0.03 10*3/MM3 (ref 0–0.4)
EOSINOPHIL NFR BLD AUTO: 0.4 % (ref 0.3–6.2)
ERYTHROCYTE [DISTWIDTH] IN BLOOD BY AUTOMATED COUNT: 13.2 % (ref 12.3–15.4)
GLOBULIN UR ELPH-MCNC: 3 GM/DL
GLUCOSE SERPL-MCNC: 92 MG/DL (ref 65–99)
HCT VFR BLD AUTO: 40.8 % (ref 37.5–51)
HGB BLD-MCNC: 13.8 G/DL (ref 13–17.7)
IMM GRANULOCYTES # BLD AUTO: 0.04 10*3/MM3 (ref 0–0.05)
IMM GRANULOCYTES NFR BLD AUTO: 0.5 % (ref 0–0.5)
INR PPP: 1.02 (ref 0.9–1.1)
LYMPHOCYTES # BLD AUTO: 1.49 10*3/MM3 (ref 0.7–3.1)
LYMPHOCYTES NFR BLD AUTO: 19 % (ref 19.6–45.3)
MCH RBC QN AUTO: 32.5 PG (ref 26.6–33)
MCHC RBC AUTO-ENTMCNC: 33.8 G/DL (ref 31.5–35.7)
MCV RBC AUTO: 96.2 FL (ref 79–97)
MONOCYTES # BLD AUTO: 0.8 10*3/MM3 (ref 0.1–0.9)
MONOCYTES NFR BLD AUTO: 10.2 % (ref 5–12)
NEUTROPHILS NFR BLD AUTO: 5.42 10*3/MM3 (ref 1.7–7)
NEUTROPHILS NFR BLD AUTO: 69.3 % (ref 42.7–76)
NRBC BLD AUTO-RTO: 0 /100 WBC (ref 0–0.2)
PLATELET # BLD AUTO: 194 10*3/MM3 (ref 140–450)
PMV BLD AUTO: 11.8 FL (ref 6–12)
POTASSIUM SERPL-SCNC: 4.5 MMOL/L (ref 3.5–5.2)
PROT SERPL-MCNC: 7.2 G/DL (ref 6–8.5)
PROTHROMBIN TIME: 13.5 SECONDS (ref 12.1–14.7)
RBC # BLD AUTO: 4.24 10*6/MM3 (ref 4.14–5.8)
SODIUM SERPL-SCNC: 138 MMOL/L (ref 136–145)
WBC NRBC COR # BLD AUTO: 7.83 10*3/MM3 (ref 3.4–10.8)

## 2024-11-19 PROCEDURE — 85610 PROTHROMBIN TIME: CPT

## 2024-11-19 PROCEDURE — 3078F DIAST BP <80 MM HG: CPT | Performed by: NURSE PRACTITIONER

## 2024-11-19 PROCEDURE — 99203 OFFICE O/P NEW LOW 30 MIN: CPT | Performed by: NURSE PRACTITIONER

## 2024-11-19 PROCEDURE — 1159F MED LIST DOCD IN RCRD: CPT | Performed by: NURSE PRACTITIONER

## 2024-11-19 PROCEDURE — 36415 COLL VENOUS BLD VENIPUNCTURE: CPT

## 2024-11-19 PROCEDURE — 85025 COMPLETE CBC W/AUTO DIFF WBC: CPT

## 2024-11-19 PROCEDURE — 1160F RVW MEDS BY RX/DR IN RCRD: CPT | Performed by: NURSE PRACTITIONER

## 2024-11-19 PROCEDURE — 85730 THROMBOPLASTIN TIME PARTIAL: CPT

## 2024-11-19 PROCEDURE — 3074F SYST BP LT 130 MM HG: CPT | Performed by: NURSE PRACTITIONER

## 2024-11-19 PROCEDURE — 80053 COMPREHEN METABOLIC PANEL: CPT

## 2024-11-19 NOTE — PROGRESS NOTES
"Chief Complaint  EVALUATE FOR BRONCH (Not mentioned in recent PET but reported on past CT's )    Subjective          Memo Steven presents to [facility] for   History of Present Illness    Mr. Stveen is a 79 year old male with a medical history significant for anxiety, arthritis, depression, fibromyalgia, hypertension, Colon cancer and hypothyroidism.      He presents today for evaluation for bronchoscopy.  He is currently following with Dr. Monterroso, hematology/oncology for Colon Adenocarcinoma (Stage IIB, T4a, N0).  He states that he underwent resection with clean margins and required no chemotherapy or radiation following.  He denies any shortness of breath or cough during today's exam.  He is a life long non smoker.  He recently had imaging completed that showed a previously noted small enhancing nodule medial to the anastomosis now measures 11 x 11 mm and previously measured 8 x 8.5   mm. This is concerning for malignancy. A PET/CT was completed and showed hypermetabolism of mediastinal and hilar adenopathy, 1.5 cm left infrahilar lymph node or central pulmonary nodule, left suprahilar node and right middle hilar node.      Objective   Vital Signs:   BP 98/66   Pulse 68   Temp 97.7 °F (36.5 °C)   Ht 172.7 cm (68\")   Wt 68 kg (150 lb)   SpO2 95%   BMI 22.81 kg/m²         Physical Exam    GENERAL APPEARANCE: Well developed, well nourished, alert and cooperative, and appears to be in no acute distress.    HEAD: normocephalic. Atraumatic.    EYES: PERRL, EOMI. Vision is grossly intact.    THROAT: Oral cavity and pharynx normal. No inflammation, swelling, exudate, or lesions.     NECK: Neck supple.  No thyromegaly.    CARDIAC: Normal S1 and S2. No S3, S4 or murmurs. Rhythm is regular.     RESPIRATORY:Bilateral air entry positive.  No wheezing, crackles or rhonchi noted.    GI: Positive bowel sounds. Soft, nondistended, nontender.     MUSCULOSKELETAL: No " "significant deformity or joint abnormality. No edema. Peripheral pulses intact. No varicosities.    NEUROLOGICAL: Strength and sensation symmetric and intact throughout.     PSYCHIATRIC: The mental examination revealed the patient was oriented to person, place, and time.       Estimated body mass index is 22.81 kg/m² as calculated from the following:    Height as of this encounter: 172.7 cm (68\").    Weight as of this encounter: 68 kg (150 lb).        Result Review :   The following data was reviewed by: MAUDE Hui on 11/19/2024:  Common labs          10/21/2024    13:36 11/19/2024    13:45   Common Labs   Glucose 167  92    BUN 17  19    Creatinine 0.94  0.87    Sodium 140  138    Potassium 4.3  4.5    Chloride 102  103    Calcium 9.3  9.3    Albumin 4.1  4.2    Total Bilirubin 0.3  0.4    Alkaline Phosphatase 70  60    AST (SGOT) 17  18    ALT (SGPT) 14  13    WBC 7.42  7.83    Hemoglobin 13.8  13.8    Hematocrit 40.7  40.8    Platelets 198  194           PFT:NA    Low dose lung cancer screening:NA    Previous chest imaging:    PET/CT: 10/16/24  FINDINGS:      HEAD:    Brain and extra-axial spaces:  Unremarkable as visualized.    Nasopharynx:  Unremarkable as visualized.      NECK:    Hypopharynx:  Unremarkable as visualized.    Larynx:  Unremarkable as visualized.    Trachea:  Unremarkable as visualized.    Retropharyngeal space:  Unremarkable as visualized.    Submandibular/parotid glands:  Unremarkable as visualized.  Glands are  normal in size.    Thyroid:  Unremarkable as visualized.  No enlarged or calcified  nodules.      CHEST:    Lungs and pleural spaces:  Unremarkable as visualized.  No mass.  No  consolidation.  No significant effusion.  No pneumothorax.    Heart:  Unremarkable as visualized.  No cardiomegaly.  No significant  pericardial effusion.    Mediastinum:  Unremarkable as visualized.  No mass.      ABDOMEN:    Liver:  Focus of relatively increased FDG hypermetabolic " activity  involving the right posterior hepatic lobe central aspect greater than  the remainder of background hepatic FDG hypermetabolic activity with  maximum SUV: 4.0. Consider further evaluation with dedicated liver  imaging either CT, MRI, or ultrasound.    Gallbladder and bile ducts:  Unremarkable as visualized.  No calcified  stones.  No ductal dilation.    Pancreas:  Unremarkable as visualized.  No ductal dilation.    Spleen:  Unremarkable as visualized.  No splenomegaly.    Adrenals:  Unremarkable as visualized.  No mass.    Kidneys and ureters:  Unremarkable as visualized.    Stomach and bowel:  Right hemicolectomy changes are noted.  Colonic  diverticulosis without diverticulitis.  No obstruction.  No FDG  hypermetabolic masses or nodules are seen in the area of ileocolic  anastomosis.      PELVIS:    Appendix:  See above.    Bladder:  Unremarkable as visualized.    Reproductive:  Prostate enlargement.      WHOLE BODY:    Intraperitoneal space:  Unremarkable as visualized.  No significant  fluid collection.  No free air.    Bones/joints:  Unremarkable as visualized.  No acute fracture.  No  dislocation.    Soft tissues:  Unremarkable as visualized.    Vasculature:  Unremarkable as visualized.  No aortic aneurysm.    Lymph nodes:  1.5 cm left infrahilar lymph node or central pulmonary  nodule with marked FDG hypermetabolism and maximum SUV: 7.1.  Left  suprahilar node with FDG hypermetabolism and maximum SUV: 6.2.  A right  middle hilar node with FDG hypermetabolism with maximum SUV: 4.4.  No  FDG hypermetabolic abdominal or pelvic adenopathy.     IMPRESSION:  1.  No FDG hypermetabolic masses or nodules are seen in the area of  ileocolic anastomosis.  2.  No FDG hypermetabolic abdominal or pelvic adenopathy.  3.  Right hemicolectomy changes are noted.  4.  Focus of relatively increased FDG hypermetabolic activity involving  the right posterior hepatic lobe central aspect greater than the  remainder of  background hepatic FDG hypermetabolic activity with maximum  SUV: 4.0. Consider further evaluation with dedicated liver imaging  either CT, MRI, or ultrasound.  5. FDG hypermetabolic mediastinal and hilar adenopathy with maximum SUV  values that fall within the range of malignancy.  6. 1.5 cm left infrahilar lymph node or central pulmonary nodule with  marked FDG hypermetabolism and maximum SUV: 7.1.  7.  Left suprahilar node with FDG hypermetabolism and maximum SUV: 6.2.  8.  A right middle hilar node with FDG hypermetabolism with maximum SUV:  4.4.        This report was finalized on 10/16/2024 1:40 PM by Dr. Nilo Davidson MD.      CT abdomen/pelvis: 9/3/24    FINDINGS:   There are dependent densities in the lung bases consistent with   atelectasis. The gallbladder is surgically absent. There is no biliary   ductal dilation. The spleen, adrenal glands, and pancreas are   unremarkable. The kidneys demonstrate no stones or hydronephrosis. There   is a lower pole right renal 1 cm hypodensity most consistent with a   cyst. This is unchanged from the prior study. There are trace vascular   calcifications of the aorta. There is no aneurysm. The urinary bladder   is unremarkable. The GI tract demonstrates no obstruction. There is   increased gas within the stomach. The small bowel is unremarkable. There   is no bowel obstruction. The patient is status post ileocecectomy with   anastomosis. Significant inflammatory changes seen on the prior study   have resolved. A previously described small enhancing nodule medial to   the anastomosis now measures 11 x 11 mm and previously measured 8 x 8.5   mm. This measures less than 5 mm on a study from 5 months prior. This   has essentially doubled in 5 months which is concerning for malignancy.   A reactive/inflammatory node is still considered in the differential.   There is no other mesenteric or retroperitoneal adenopathy identified.   There is moderate pandiverticulosis  "predominantly involving the   ascending, transverse, and descending colon. There is sparing of the   sigmoid colon. There is no free air, free fluid or inflammatory process.     Alpha-1 antitrypsin screening:NA    STOP-Bang Score:   NA  [place] Sleepiness Scale:   NA      ABG:    pH No results found for: \"PHART\"   pO2 No results found for: \"PO2ART\"   pCO2 No results found for: \"XIF6WHP\"   HCO3 No results found for: \"NID3DVP\"                      Assessment and Plan    Problem List Items Addressed This Visit          Symptoms and Signs    Mediastinal lymphadenopathy - Primary    Relevant Orders    CBC & Differential (Completed)    Comprehensive Metabolic Panel (Completed)    aPTT (Completed)    Case Request (Completed)     Other Visit Diagnoses       Neoplasm of unspecified behavior of unspecified site        Relevant Orders    aPTT (Completed)            Memo Steven  reports that he has never smoked. He has never been exposed to tobacco smoke. He has never used smokeless tobacco.        We discussed PET/CT imaging in detail.    He is already set up for MRI of the Abdomen and Pelvis.    We will plan to do EBUS with Dr. Tate on 11/002/24. Procedure was discussed with him in detail, including risk.  Ordered CBC, CMP, pt-INR and Ptt for pre op evaluation.    Follow Up   Return in about 1 week (around 11/26/2024).  Patient was given instructions and counseling regarding his condition or for health maintenance advice. Please see specific information pulled into the AVS if appropriate.        "

## 2024-11-19 NOTE — TELEPHONE ENCOUNTER
Spoke with pt during office visit. Provided arrival time of 9:00 AM on 11/20 for bronch with Dr. Tate. Advised NPO at midnight and 2 hours after procedure, pt may take morning blood pressure meds with a sip of water. Pt is aware to have a  present and will get pre-procedure labs drawn at the C when he leaves our office.

## 2024-11-20 ENCOUNTER — ANESTHESIA EVENT (OUTPATIENT)
Dept: PERIOP | Facility: HOSPITAL | Age: 79
End: 2024-11-20
Payer: MEDICARE

## 2024-11-20 ENCOUNTER — APPOINTMENT (OUTPATIENT)
Dept: GENERAL RADIOLOGY | Facility: HOSPITAL | Age: 79
End: 2024-11-20
Payer: MEDICARE

## 2024-11-20 ENCOUNTER — HOSPITAL ENCOUNTER (OUTPATIENT)
Facility: HOSPITAL | Age: 79
Setting detail: HOSPITAL OUTPATIENT SURGERY
Discharge: HOME | End: 2024-11-20
Attending: INTERNAL MEDICINE | Admitting: INTERNAL MEDICINE
Payer: MEDICARE

## 2024-11-20 ENCOUNTER — ANESTHESIA (OUTPATIENT)
Dept: PERIOP | Facility: HOSPITAL | Age: 79
End: 2024-11-20
Payer: MEDICARE

## 2024-11-20 VITALS
SYSTOLIC BLOOD PRESSURE: 114 MMHG | OXYGEN SATURATION: 95 % | HEIGHT: 68 IN | DIASTOLIC BLOOD PRESSURE: 79 MMHG | WEIGHT: 150 LBS | HEART RATE: 86 BPM | BODY MASS INDEX: 22.73 KG/M2 | RESPIRATION RATE: 18 BRPM | TEMPERATURE: 98.2 F

## 2024-11-20 DIAGNOSIS — R59.0 MEDIASTINAL LYMPHADENOPATHY: ICD-10-CM

## 2024-11-20 PROCEDURE — 25010000002 ONDANSETRON PER 1 MG: Performed by: NURSE ANESTHETIST, CERTIFIED REGISTERED

## 2024-11-20 PROCEDURE — 25010000002 PROPOFOL 200 MG/20ML EMULSION: Performed by: NURSE ANESTHETIST, CERTIFIED REGISTERED

## 2024-11-20 PROCEDURE — 87102 FUNGUS ISOLATION CULTURE: CPT | Performed by: INTERNAL MEDICINE

## 2024-11-20 PROCEDURE — 25010000002 FENTANYL CITRATE (PF) 50 MCG/ML SOLUTION: Performed by: NURSE ANESTHETIST, CERTIFIED REGISTERED

## 2024-11-20 PROCEDURE — 94799 UNLISTED PULMONARY SVC/PX: CPT

## 2024-11-20 PROCEDURE — 87205 SMEAR GRAM STAIN: CPT | Performed by: INTERNAL MEDICINE

## 2024-11-20 PROCEDURE — 87070 CULTURE OTHR SPECIMN AEROBIC: CPT | Performed by: INTERNAL MEDICINE

## 2024-11-20 PROCEDURE — 87116 MYCOBACTERIA CULTURE: CPT | Performed by: INTERNAL MEDICINE

## 2024-11-20 PROCEDURE — 94640 AIRWAY INHALATION TREATMENT: CPT

## 2024-11-20 PROCEDURE — 25010000002 LIDOCAINE HCL (PF) 4 % SOLUTION: Performed by: INTERNAL MEDICINE

## 2024-11-20 PROCEDURE — 25010000002 LIDOCAINE PF 1% 1 % SOLUTION: Performed by: INTERNAL MEDICINE

## 2024-11-20 PROCEDURE — 87071 CULTURE AEROBIC QUANT OTHER: CPT | Performed by: INTERNAL MEDICINE

## 2024-11-20 PROCEDURE — 87206 SMEAR FLUORESCENT/ACID STAI: CPT | Performed by: INTERNAL MEDICINE

## 2024-11-20 RX ORDER — ONDANSETRON 2 MG/ML
4 INJECTION INTRAMUSCULAR; INTRAVENOUS AS NEEDED
Status: DISCONTINUED | OUTPATIENT
Start: 2024-11-20 | End: 2024-11-20 | Stop reason: HOSPADM

## 2024-11-20 RX ORDER — SODIUM CHLORIDE 9 MG/ML
INJECTION, SOLUTION INTRAVENOUS AS NEEDED
Status: DISCONTINUED | OUTPATIENT
Start: 2024-11-20 | End: 2024-11-20 | Stop reason: HOSPADM

## 2024-11-20 RX ORDER — PROPOFOL 10 MG/ML
INJECTION, EMULSION INTRAVENOUS AS NEEDED
Status: DISCONTINUED | OUTPATIENT
Start: 2024-11-20 | End: 2024-11-20 | Stop reason: SURG

## 2024-11-20 RX ORDER — LIDOCAINE HYDROCHLORIDE 40 MG/ML
3 INJECTION, SOLUTION RETROBULBAR ONCE
Status: COMPLETED | OUTPATIENT
Start: 2024-11-20 | End: 2024-11-20

## 2024-11-20 RX ORDER — EPHEDRINE SULFATE 5 MG/ML
INJECTION INTRAVENOUS AS NEEDED
Status: DISCONTINUED | OUTPATIENT
Start: 2024-11-20 | End: 2024-11-20 | Stop reason: SURG

## 2024-11-20 RX ORDER — FAMOTIDINE 10 MG/ML
INJECTION, SOLUTION INTRAVENOUS AS NEEDED
Status: DISCONTINUED | OUTPATIENT
Start: 2024-11-20 | End: 2024-11-20 | Stop reason: SURG

## 2024-11-20 RX ORDER — OXYCODONE AND ACETAMINOPHEN 5; 325 MG/1; MG/1
1 TABLET ORAL ONCE AS NEEDED
Status: DISCONTINUED | OUTPATIENT
Start: 2024-11-20 | End: 2024-11-20 | Stop reason: HOSPADM

## 2024-11-20 RX ORDER — FENTANYL CITRATE 50 UG/ML
INJECTION, SOLUTION INTRAMUSCULAR; INTRAVENOUS AS NEEDED
Status: DISCONTINUED | OUTPATIENT
Start: 2024-11-20 | End: 2024-11-20 | Stop reason: SURG

## 2024-11-20 RX ORDER — FENTANYL CITRATE 50 UG/ML
50 INJECTION, SOLUTION INTRAMUSCULAR; INTRAVENOUS
Status: DISCONTINUED | OUTPATIENT
Start: 2024-11-20 | End: 2024-11-20 | Stop reason: HOSPADM

## 2024-11-20 RX ORDER — ONDANSETRON 2 MG/ML
INJECTION INTRAMUSCULAR; INTRAVENOUS AS NEEDED
Status: DISCONTINUED | OUTPATIENT
Start: 2024-11-20 | End: 2024-11-20 | Stop reason: SURG

## 2024-11-20 RX ORDER — ALBUTEROL SULFATE 0.83 MG/ML
2.5 SOLUTION RESPIRATORY (INHALATION) ONCE
Status: COMPLETED | OUTPATIENT
Start: 2024-11-20 | End: 2024-11-20

## 2024-11-20 RX ORDER — IPRATROPIUM BROMIDE AND ALBUTEROL SULFATE 2.5; .5 MG/3ML; MG/3ML
3 SOLUTION RESPIRATORY (INHALATION) ONCE AS NEEDED
Status: DISCONTINUED | OUTPATIENT
Start: 2024-11-20 | End: 2024-11-20 | Stop reason: HOSPADM

## 2024-11-20 RX ORDER — LIDOCAINE HYDROCHLORIDE 10 MG/ML
INJECTION, SOLUTION EPIDURAL; INFILTRATION; INTRACAUDAL; PERINEURAL AS NEEDED
Status: DISCONTINUED | OUTPATIENT
Start: 2024-11-20 | End: 2024-11-20 | Stop reason: HOSPADM

## 2024-11-20 RX ORDER — MEPERIDINE HYDROCHLORIDE 25 MG/ML
12.5 INJECTION INTRAMUSCULAR; INTRAVENOUS; SUBCUTANEOUS
Status: DISCONTINUED | OUTPATIENT
Start: 2024-11-20 | End: 2024-11-20 | Stop reason: HOSPADM

## 2024-11-20 RX ADMIN — LIDOCAINE HYDROCHLORIDE 3 ML: 40 INJECTION, SOLUTION RETROBULBAR; TOPICAL at 09:59

## 2024-11-20 RX ADMIN — FENTANYL CITRATE 25 MCG: 50 INJECTION INTRAMUSCULAR; INTRAVENOUS at 10:46

## 2024-11-20 RX ADMIN — ONDANSETRON 4 MG: 2 INJECTION INTRAMUSCULAR; INTRAVENOUS at 10:36

## 2024-11-20 RX ADMIN — FENTANYL CITRATE 25 MCG: 50 INJECTION INTRAMUSCULAR; INTRAVENOUS at 10:55

## 2024-11-20 RX ADMIN — ALBUTEROL SULFATE 2.5 MG: 2.5 SOLUTION RESPIRATORY (INHALATION) at 09:52

## 2024-11-20 RX ADMIN — PROPOFOL 150 MG: 10 INJECTION, EMULSION INTRAVENOUS at 10:38

## 2024-11-20 RX ADMIN — FENTANYL CITRATE 25 MCG: 50 INJECTION INTRAMUSCULAR; INTRAVENOUS at 11:00

## 2024-11-20 RX ADMIN — FENTANYL CITRATE 25 MCG: 50 INJECTION INTRAMUSCULAR; INTRAVENOUS at 10:36

## 2024-11-20 RX ADMIN — EPHEDRINE SULFATE 10 MG: 5 INJECTION INTRAVENOUS at 10:45

## 2024-11-20 RX ADMIN — FAMOTIDINE 20 MG: 10 INJECTION, SOLUTION INTRAVENOUS at 10:36

## 2024-11-20 NOTE — OP NOTE
Bronchoscopy Procedure Note    Date of Operation: 11/20/2024    Pre-op Diagnosis: Abnormal PET/CT    Post-op Diagnosis: Abnormal PET/CT    Surgeon: Heriberto Tate MD    Assistants: None    Anesthesia: Please see anesthesia report for details    Operation: Flexible fiberoptic bronchoscopy, diagnostic, EBUS    Findings: Mucopurulent secretions were seen in the left lower lobe otherwise no endobronchial masses or lesions were seen.    Specimen:   Bronchial washings    Bronchoalveolar lavage of lingula  Bronchoalveolar lavage of right middle lobe  Estimated Blood Loss: None    Complications: None    Indications and History:  The patient is a 79 y.o. male with abnormal PET CT chest.  The risks, benefits, complications, treatment options and expected outcomes were discussed with the patient.  The possibilities of reaction to medication, pulmonary aspiration, perforation of a viscus, bleeding, failure to diagnose a condition and creating a complication requiring transfusion or operation were discussed with the patient who freely signed the consent.      Description of Procedure:  The patient was seen in the Holding Room and the site of surgery properly noted/marked.  The patient was taken to endoscopy suite, identified as Memo Steven and the procedure verified as Flexible Fiberoptic Bronchoscopy.  A Time Out was held and the above information confirmed.     the patient was positioned  and the bronchoscope was passed through the LMA . The vocal cords were visualized and  2 ml 1 % lidocaine was topically placed onto the cords. The cords were normal. The scope was then passed into the trachea.      2 ml 1 % lidocaine was used topically on the deborah.  Careful inspection of the tracheal lumen was accomplished. The scope was sequentially passed into the left main and then left upper and lower bronchi and segmental bronchi.       The scope was then withdrawn and advanced into the right main bronchus and then into the RUL,  RML, and RLL bronchi and segmental bronchi.     No endobronchial masses or lesions were seen on normal bronchoscopy.    On endobronchial ultrasound no major lymphadenopathy or masses were identified.  Samples-  Bronchial washings    Then the bronchoscope was wedged into the right middle lobe for a bronchoalveolar lavage close to 40 cc of saline was given once and close to 16 cc was aspirated back.    Then the bronchoscope was wedged into the lingula for a bronchoalveolar lavage and close to 40 cc of saline was given once and close to 14 cc was aspirated back.    Then the endobronchial ultrasound was introduced and mediastinal survey was done.  No mediastinal lymphadenopathy was identified.          Samples were sent for cytology and microbiology.  Please follow up the results  The Patient was taken to the Endoscopy Recovery area in satisfactory condition.        Heriberto Tate MD

## 2024-11-20 NOTE — H&P
Chief Complaint:  Here for a bronchoscopy for abnormal imaging - ct chest    Ct chest reviewed     Subjective     Interval History: no changes since the last time since was seen in office        Review of Systems:    Review of system system is negative for shortness of breath chest pain lightheadedness dizziness any numbness in any area of the body belly pain nausea vomiting diarrhea shortness of breath cough          Vital Signs  Temp:  [97 °F (36.1 °C)-97.7 °F (36.5 °C)] 97 °F (36.1 °C)  Heart Rate:  [61-74] 66  Resp:  [18] 18  BP: ()/(66-68) 105/68  Body mass index is 22.81 kg/m².  No intake or output data in the 24 hours ending 11/20/24 1032  No intake/output data recorded.    Physical Exam:   General- normal in appearance, not in any acute distress    HEENT- pupils equally reactive to light, normal in size, no scleral icterus    Neck-supple    Chest-respirations normal-on auscultation no wheezing no crackles    S1 and S2 normal    Abdomen-nontender nondistended bowel sounds positive    CNS-nonfocal    Extremities-no edema    Psychiatric-mood good, good eye contact, alert awake oriented     Results Review:     I reviewed the patient's new clinical results.  Results from last 7 days   Lab Units 11/19/24  1345   WBC 10*3/mm3 7.83   HEMOGLOBIN g/dL 13.8   PLATELETS 10*3/mm3 194     Results from last 7 days   Lab Units 11/19/24  1345   SODIUM mmol/L 138   POTASSIUM mmol/L 4.5   CHLORIDE mmol/L 103   CO2 mmol/L 23.8   BUN mg/dL 19   CREATININE mg/dL 0.87   CALCIUM mg/dL 9.3   GLUCOSE mg/dL 92     Lab Results   Component Value Date    INR 1.02 11/19/2024    PROTIME 13.5 11/19/2024     Results from last 7 days   Lab Units 11/19/24  1345   ALK PHOS U/L 60   BILIRUBIN mg/dL 0.4   ALT (SGPT) U/L 13   AST (SGOT) U/L 18         Imaging Results (Last 24 Hours)       Procedure Component Value Units Date/Time    FL Surgery Fluoro [[id number]] Resulted: 11/20/24 1024     Updated: 11/20/24 1030                   No  current facility-administered medications for this encounter.      Medication Review:     Assessment & Plan     Abnormal ct chest- all the medications history physical labs are reviewed.  We will do a bronchoscopy with bronchoalveolar lavage his bronchial brushings.  We'll send it for cytology and microbiology.    Informed consent taken.      Mediastinal lymphadenopathy               Heriberto Tate MD  11/20/24  10:32 EST

## 2024-11-20 NOTE — ANESTHESIA POSTPROCEDURE EVALUATION
Patient: Memo Steven    Procedure Summary       Date: 11/20/24 Room / Location:  [facility] OR 09 /  [facility]    Anesthesia Start: 1036 Anesthesia Stop: 1102    Procedure: BRONCHOSCOPY WITH ENDOBRONCHIAL ULTRASOUND (Bronchus) Diagnosis:       Mediastinal lymphadenopathy      (Mediastinal lymphadenopathy [R59.0])    Surgeons: Heriberto Tate MD Provider: Azeem Corcoran DO    Anesthesia Type: general ASA Status: 2            Anesthesia Type: general    Vitals  Vitals Value Taken Time   /79 11/20/24 1133   Temp 98.2 °F (36.8 °C) 11/20/24 1103   Pulse 86 11/20/24 1133   Resp 18 11/20/24 1133   SpO2 95 % 11/20/24 1123           Post Anesthesia Care and Evaluation    Patient location during evaluation: PHASE II  Patient participation: complete - patient participated  Level of consciousness: awake and alert  Pain score: 1  Pain management: adequate    Airway patency: patent  Anesthetic complications: No anesthetic complications  PONV Status: controlled  Cardiovascular status: acceptable  Respiratory status: acceptable  Hydration status: acceptable

## 2024-11-20 NOTE — ANESTHESIA PROCEDURE NOTES
Airway  Urgency: elective    Date/Time: 11/20/2024 10:38 AM  End Time:11/20/2024 10:38 AM    General Information and Staff    Patient location during procedure: OR  CRNA/CAA: Sunday Torres CRNA    Indications and Patient Condition  Indications for airway management: airway protection    Preoxygenated: yes  MILS maintained throughout  Mask difficulty assessment: 0 - not attempted    Final Airway Details  Final airway type: supraglottic airway      Successful airway: unique  Size 3  Airway Seal Pressure (cm H2O): 20     Number of attempts at approach: 1  Assessment: lips, teeth, and gum same as pre-op and atraumatic intubation    Additional Comments  Atraumatic

## 2024-11-20 NOTE — ANESTHESIA PREPROCEDURE EVALUATION
Anesthesia Evaluation     Patient summary reviewed and Nursing notes reviewed   no history of anesthetic complications:                Airway   Mallampati: II  TM distance: >3 FB  Neck ROM: full  No difficulty expected  Dental - normal exam     Pulmonary - negative pulmonary ROS and normal exam   Cardiovascular - normal exam  Exercise tolerance: poor (<4 METS)    NYHA Classification: II    (+) hypertension, hyperlipidemia      Neuro/Psych  (+) psychiatric history  GI/Hepatic/Renal/Endo    (+) thyroid problem hypothyroidism    Musculoskeletal     Abdominal  - normal exam    Bowel sounds: normal.   Substance History - negative use     OB/GYN negative ob/gyn ROS         Other   arthritis,                   Anesthesia Plan    ASA 2     general     (TAP blocks)  intravenous induction     Anesthetic plan, risks, benefits, and alternatives have been provided, discussed and informed consent has been obtained with: patient.    Plan discussed with CRNA.    CODE STATUS:

## 2024-11-21 LAB
ACID FAST STN SPEC: NEGATIVE
REF LAB TEST METHOD: NORMAL
SPECIMEN PREPARATION: NORMAL

## 2024-11-22 LAB
BACTERIA SPEC AEROBE CULT: NO GROWTH
BACTERIA SPEC AEROBE CULT: NO GROWTH
BACTERIA SPEC RESP CULT: NORMAL
GRAM STN SPEC: NORMAL

## 2024-11-23 LAB
FUNGUS SPEC CULT: NORMAL
FUNGUS SPEC FUNGUS STN: NORMAL

## 2024-11-26 ENCOUNTER — TELEMEDICINE (OUTPATIENT)
Dept: PULMONOLOGY | Facility: CLINIC | Age: 79
End: 2024-11-26
Payer: MEDICARE

## 2024-11-26 DIAGNOSIS — R59.0 MEDIASTINAL LYMPHADENOPATHY: Primary | ICD-10-CM

## 2024-11-26 NOTE — PROGRESS NOTES
"Chief Complaint  Mediastinal lymphadenopathy    Subjective          Memo Steven presents to [facility] for   History of Present Illness      Mr. Steven is a 79 year old male with a medical history significant for hypertension, hypothyroidism, anxiety, depression, fibromyalgia, and colon cancer.    He is doing a telehealth visit today for bronchoscopy results. He is currently following with Dr. Monterroso, hematology/oncology for Colon Adenocarcinoma (Stage IIB, T4a, N0).  He states that he underwent resection with clean margins and required no chemotherapy or radiation following.      He underwent bronchoscopy with Dr. Tate on 11/20/24.  BAL, AFB and fungal cultures were noted to be negative.    He reports that his is doing well today and denies any shortness of breath.    Objective   Vital Signs:   There were no vitals taken for this visit.        Physical Exam    Physical exam is deferred as visit was done via telehealth.    Estimated body mass index is 22.81 kg/m² as calculated from the following:    Height as of 11/20/24: 172.7 cm (68\").    Weight as of 11/20/24: 68 kg (150 lb).        Result Review :   The following data was reviewed by: MAUDE Hui on 11/26/2024:  Common labs          10/21/2024    13:36 11/19/2024    13:45   Common Labs   Glucose 167  92    BUN 17  19    Creatinine 0.94  0.87    Sodium 140  138    Potassium 4.3  4.5    Chloride 102  103    Calcium 9.3  9.3    Albumin 4.1  4.2    Total Bilirubin 0.3  0.4    Alkaline Phosphatase 70  60    AST (SGOT) 17  18    ALT (SGPT) 14  13    WBC 7.42  7.83    Hemoglobin 13.8  13.8    Hematocrit 40.7  40.8    Platelets 198  194           PFT:NA     Low dose lung cancer screening:NA     Previous chest imaging:     PET/CT: 10/16/24  FINDINGS:      HEAD:    Brain and extra-axial spaces:  Unremarkable as visualized.    Nasopharynx:  Unremarkable as visualized.      NECK:    Hypopharynx:  " Unremarkable as visualized.    Larynx:  Unremarkable as visualized.    Trachea:  Unremarkable as visualized.    Retropharyngeal space:  Unremarkable as visualized.    Submandibular/parotid glands:  Unremarkable as visualized.  Glands are  normal in size.    Thyroid:  Unremarkable as visualized.  No enlarged or calcified  nodules.      CHEST:    Lungs and pleural spaces:  Unremarkable as visualized.  No mass.  No  consolidation.  No significant effusion.  No pneumothorax.    Heart:  Unremarkable as visualized.  No cardiomegaly.  No significant  pericardial effusion.    Mediastinum:  Unremarkable as visualized.  No mass.      ABDOMEN:    Liver:  Focus of relatively increased FDG hypermetabolic activity  involving the right posterior hepatic lobe central aspect greater than  the remainder of background hepatic FDG hypermetabolic activity with  maximum SUV: 4.0. Consider further evaluation with dedicated liver  imaging either CT, MRI, or ultrasound.    Gallbladder and bile ducts:  Unremarkable as visualized.  No calcified  stones.  No ductal dilation.    Pancreas:  Unremarkable as visualized.  No ductal dilation.    Spleen:  Unremarkable as visualized.  No splenomegaly.    Adrenals:  Unremarkable as visualized.  No mass.    Kidneys and ureters:  Unremarkable as visualized.    Stomach and bowel:  Right hemicolectomy changes are noted.  Colonic  diverticulosis without diverticulitis.  No obstruction.  No FDG  hypermetabolic masses or nodules are seen in the area of ileocolic  anastomosis.      PELVIS:    Appendix:  See above.    Bladder:  Unremarkable as visualized.    Reproductive:  Prostate enlargement.      WHOLE BODY:    Intraperitoneal space:  Unremarkable as visualized.  No significant  fluid collection.  No free air.    Bones/joints:  Unremarkable as visualized.  No acute fracture.  No  dislocation.    Soft tissues:  Unremarkable as visualized.    Vasculature:  Unremarkable as visualized.  No aortic aneurysm.     Lymph nodes:  1.5 cm left infrahilar lymph node or central pulmonary  nodule with marked FDG hypermetabolism and maximum SUV: 7.1.  Left  suprahilar node with FDG hypermetabolism and maximum SUV: 6.2.  A right  middle hilar node with FDG hypermetabolism with maximum SUV: 4.4.  No  FDG hypermetabolic abdominal or pelvic adenopathy.     IMPRESSION:  1.  No FDG hypermetabolic masses or nodules are seen in the area of  ileocolic anastomosis.  2.  No FDG hypermetabolic abdominal or pelvic adenopathy.  3.  Right hemicolectomy changes are noted.  4.  Focus of relatively increased FDG hypermetabolic activity involving  the right posterior hepatic lobe central aspect greater than the  remainder of background hepatic FDG hypermetabolic activity with maximum  SUV: 4.0. Consider further evaluation with dedicated liver imaging  either CT, MRI, or ultrasound.  5. FDG hypermetabolic mediastinal and hilar adenopathy with maximum SUV  values that fall within the range of malignancy.  6. 1.5 cm left infrahilar lymph node or central pulmonary nodule with  marked FDG hypermetabolism and maximum SUV: 7.1.  7.  Left suprahilar node with FDG hypermetabolism and maximum SUV: 6.2.  8.  A right middle hilar node with FDG hypermetabolism with maximum SUV:  4.4.        This report was finalized on 10/16/2024 1:40 PM by Dr. Nilo Davidson MD.        CT abdomen/pelvis: 9/3/24     FINDINGS:   There are dependent densities in the lung bases consistent with   atelectasis. The gallbladder is surgically absent. There is no biliary   ductal dilation. The spleen, adrenal glands, and pancreas are   unremarkable. The kidneys demonstrate no stones or hydronephrosis. There   is a lower pole right renal 1 cm hypodensity most consistent with a   cyst. This is unchanged from the prior study. There are trace vascular   calcifications of the aorta. There is no aneurysm. The urinary bladder   is unremarkable. The GI tract demonstrates no obstruction. There is  "  increased gas within the stomach. The small bowel is unremarkable. There   is no bowel obstruction. The patient is status post ileocecectomy with   anastomosis. Significant inflammatory changes seen on the prior study   have resolved. A previously described small enhancing nodule medial to   the anastomosis now measures 11 x 11 mm and previously measured 8 x 8.5   mm. This measures less than 5 mm on a study from 5 months prior. This   has essentially doubled in 5 months which is concerning for malignancy.   A reactive/inflammatory node is still considered in the differential.   There is no other mesenteric or retroperitoneal adenopathy identified.   There is moderate pandiverticulosis predominantly involving the   ascending, transverse, and descending colon. There is sparing of the   sigmoid colon. There is no free air, free fluid or inflammatory process.      Alpha-1 antitrypsin screening:NA     STOP-Bang Score:   NA  Newberry Sleepiness Scale:   NA          ABG:    pH No results found for: \"PHART\"   pO2 No results found for: \"PO2ART\"   pCO2 No results found for: \"XLL3YJV\"   HCO3 No results found for: \"ADL6YPN\"                      Assessment and Plan    Problem List Items Addressed This Visit          Symptoms and Signs    Mediastinal lymphadenopathy - Primary     Memo Steven  reports that he has never smoked. He has never been exposed to tobacco smoke. He has never used smokeless tobacco.       He underwent bronchoscopy with Dr. Tate on 11/20/24.  BAL, AFB and fungal cultures were noted to be negative.    Cytology was negative for malignancy.    He is scheduled for MRI of the abdomen and pelvis in February.  He is currently being followed by oncology.      Follow Up   No follow-ups on file.  Patient was given instructions and counseling regarding his condition or for health maintenance advice. Please see specific information pulled into the AVS if appropriate.        "

## 2024-11-30 LAB
FUNGUS SPEC CULT: ABNORMAL
FUNGUS SPEC FUNGUS STN: ABNORMAL

## 2024-12-09 LAB
FUNGUS SPEC CULT: NORMAL
FUNGUS SPEC FUNGUS STN: NORMAL

## 2024-12-11 ENCOUNTER — HOSPITAL ENCOUNTER (OUTPATIENT)
Dept: CT IMAGING | Facility: HOSPITAL | Age: 79
Discharge: HOME | End: 2024-12-11
Admitting: INTERNAL MEDICINE
Payer: MEDICARE

## 2024-12-11 DIAGNOSIS — C18.9 MALIGNANT NEOPLASM OF COLON, UNSPECIFIED PART OF COLON: ICD-10-CM

## 2024-12-11 LAB
CREAT BLDA-MCNC: 1.1 MG/DL (ref 0.6–1.3)
FUNGUS SPEC CULT: ABNORMAL
FUNGUS SPEC FUNGUS STN: ABNORMAL

## 2024-12-11 PROCEDURE — 74177 CT ABD & PELVIS W/CONTRAST: CPT

## 2024-12-11 PROCEDURE — 82565 ASSAY OF CREATININE: CPT

## 2024-12-11 PROCEDURE — 25510000001 IOPAMIDOL 61 % SOLUTION: Performed by: INTERNAL MEDICINE

## 2024-12-11 RX ORDER — IOPAMIDOL 612 MG/ML
100 INJECTION, SOLUTION INTRAVASCULAR
Status: COMPLETED | OUTPATIENT
Start: 2024-12-11 | End: 2024-12-11

## 2024-12-11 RX ADMIN — IOPAMIDOL 80 ML: 612 INJECTION, SOLUTION INTRAVENOUS at 15:20

## 2024-12-13 DIAGNOSIS — B40.9 INFECTION DUE TO BLASTOMYCES: Primary | ICD-10-CM

## 2024-12-13 DIAGNOSIS — R59.0 MEDIASTINAL LYMPHADENOPATHY: ICD-10-CM

## 2024-12-13 NOTE — PROGRESS NOTES
Fungal Culture from bronchoscopy was noted to be positive for blastomyces.  Placed referral for ID and will obtain repeat imaging in January 2025.  Notified the patient of plan of care.

## 2024-12-19 LAB
FUNGUS SPEC CULT: ABNORMAL
FUNGUS SPEC FUNGUS STN: ABNORMAL

## 2024-12-20 LAB
FUNGUS SPEC CULT: ABNORMAL
FUNGUS SPEC FUNGUS STN: ABNORMAL

## 2024-12-23 ENCOUNTER — HOSPITAL ENCOUNTER (OUTPATIENT)
Dept: CT IMAGING | Facility: HOSPITAL | Age: 79
Discharge: HOME | End: 2024-12-23
Admitting: INTERNAL MEDICINE
Payer: MEDICARE

## 2024-12-23 ENCOUNTER — OFFICE VISIT (OUTPATIENT)
Dept: INFECTIOUS DISEASES | Facility: CLINIC | Age: 79
End: 2024-12-23
Payer: MEDICARE

## 2024-12-23 VITALS
HEIGHT: 68 IN | WEIGHT: 150.6 LBS | BODY MASS INDEX: 22.82 KG/M2 | HEART RATE: 80 BPM | OXYGEN SATURATION: 92 % | DIASTOLIC BLOOD PRESSURE: 91 MMHG | SYSTOLIC BLOOD PRESSURE: 140 MMHG

## 2024-12-23 DIAGNOSIS — B40.1: ICD-10-CM

## 2024-12-23 DIAGNOSIS — B49 FUNGAL INFECTION: ICD-10-CM

## 2024-12-23 DIAGNOSIS — B49 FUNGAL INFECTION: Primary | ICD-10-CM

## 2024-12-23 LAB
CRP SERPL-MCNC: 0.34 MG/DL (ref 0–0.5)
FUNGUS SPEC CULT: ABNORMAL
FUNGUS SPEC FUNGUS STN: ABNORMAL

## 2024-12-23 PROCEDURE — 86606 ASPERGILLUS ANTIBODY: CPT | Performed by: INTERNAL MEDICINE

## 2024-12-23 PROCEDURE — 71250 CT THORAX DX C-: CPT

## 2024-12-23 PROCEDURE — 71250 CT THORAX DX C-: CPT | Performed by: RADIOLOGY

## 2024-12-23 PROCEDURE — 86140 C-REACTIVE PROTEIN: CPT | Performed by: INTERNAL MEDICINE

## 2024-12-23 PROCEDURE — 87449 NOS EACH ORGANISM AG IA: CPT | Performed by: INTERNAL MEDICINE

## 2024-12-23 PROCEDURE — 86612 BLASTOMYCES ANTIBODY: CPT | Performed by: INTERNAL MEDICINE

## 2024-12-23 NOTE — PROGRESS NOTES
[facility]         Referring Provider: Kellen Silverio, APRN  [address] [facility]  [address]  [place] [address]    Subjective      Chief Complaint  Initial Evaluation (Infection die to blastomyces/ abnormal culture)    History of Present Illness  Memo Steven is a 79 y.o. male who presents today to [facility] for Initial Consultation  .     Memo Steven is a 79 y.o. male pt here for follow up Requesting referral to psych at [facility] in [place]. He was seeing DR Tyler who closed his practice due to cancer.  Patient wife reports she has called and was told to get a referral from PCP     Hypertension is stable. He is taking amlodipine  Blood pressure doing well at home     Hyperlipidemia.  He is taking zetia and pravastatin     Hypothyroidism on levothyroxine     Vitamin D deficiency on vitamin D3     Anxiety/depression.  Xanax 3 times a day, Ambien 10 mg daily, Lexapro 20 mg daily, Zyprexa 20 mg daily  Sleep about 8 hrs at night.  Patient reports anxiety depression is stable on the current regimen        GERD symptoms are better.  Takes Prilosec as needed        He is taking miralax every day.He is taking linzess as needed     He is seeing oncology regarding: Adenocarcinoma     No other concerns.    Patient has no respiratory symptoms whatsoever today but because of nodule went for bronch and showing growth of Candida and Blastomyces.    Past Medical History:   Diagnosis Date    Anxiety disorder     Arthritis     Colon polyps     Depression     Diverticulitis     Fibromyalgia     High blood pressure     High cholesterol     High triglycerides     Panic attacks     Thyroid disorder        Past Surgical History:   Procedure Laterality Date    BRONCHOSCOPY N/A 11/20/2024    Procedure: BRONCHOSCOPY WITH ENDOBRONCHIAL ULTRASOUND;  Surgeon: Heriberto Tate MD;  Location: [facility];  Service: Pulmonary;  Laterality: N/A;    CHOLECYSTECTOMY  2017    COLON RESECTION N/A  8/29/2023    Procedure: ILEOCOLIC RESECTION  LAPAROSCOPIC;  Surgeon: Guido Etienne MD;  Location[facility];  Service: General;  Laterality: N/A;    COLONOSCOPY      LARYNX SURGERY      NECK SURGERY      disc fusion       Social History     Socioeconomic History    Marital status:    Tobacco Use    Smoking status: Never     Passive exposure: Never    Smokeless tobacco: Never   Vaping Use    Vaping status: Never Used   Substance and Sexual Activity    Alcohol use: No    Drug use: No    Sexual activity: Not Currently     Partners: Female     Birth control/protection: Post-menopausal       Family History  family history includes Breast cancer in his sister; Diabetes in his mother; Heart disease in his brother; Hypertension in his mother; Prostate cancer in his brother.    Immunization History   Administered Date(s) Administered    COVID-19 (MODERNA) 1st,2nd,3rd Dose Monovalent 01/14/2021, 02/17/2021    COVID-19 (MODERNA) BIVALENT 12+YRS 09/30/2022    COVID-19 (MODERNA) Monovalent Original Booster 10/29/2021    FLUAD TRI 65YR+ 10/10/2017, 09/24/2024    Fluzone (or Fluarix & Flulaval for VFC) >6mos 10/01/2018, 09/25/2019    Fluzone High-Dose 65+yrs 09/27/2021, 09/11/2022, 09/18/2023    Pneumococcal Conjugate 13-Valent (PCV13) 06/11/2019    Pneumococcal Conjugate 20-Valent (PCV20) 03/13/2024    Tdap 02/19/2013        Allergies  Allergies   Allergen Reactions    Aspirin GI Intolerance    Nsaids GI Intolerance       The medication list has been reviewed and updated.   Current Medications    Current Outpatient Medications:     ALPRAZolam (XANAX) 1 MG tablet, Take 1 tablet by mouth 3 (Three) Times a Day As Needed., Disp: , Rfl: 5    amLODIPine (NORVASC) 2.5 MG tablet, Take 1 tablet by mouth Daily., Disp: , Rfl: 1    cetirizine (zyrTEC) 10 MG tablet, Take 1 tablet by mouth Daily As Needed., Disp: , Rfl: 3    Cholecalciferol (Vitamin D3) 50 MCG (2000 UT) capsule, Take 1 capsule by mouth Daily., Disp: , Rfl:      cycloSPORINE (RESTASIS) 0.05 % ophthalmic emulsion, 1 drop 2 (Two) Times a Day., Disp: , Rfl:     escitalopram (LEXAPRO) 20 MG tablet, Take 2 tablets by mouth Every Morning., Disp: , Rfl: 5    fluticasone (VERAMYST) 27.5 MCG/SPRAY nasal spray, Administer 2 sprays into the nostril(s) as directed by provider Daily., Disp: , Rfl:     levothyroxine sodium (TIROSINT) 50 MCG capsule, Take 1 capsule by mouth Every Morning., Disp: , Rfl: 1    Linzess 72 MCG capsule capsule, Take 1 capsule by mouth Every Morning Before Breakfast., Disp: , Rfl:     OLANZapine zydis (zyPREXA) 20 MG disintegrating tablet, Place 1 tablet on the tongue Every Night., Disp: , Rfl:     polyethylene glycol (MIRALAX) powder, Take 17 g by mouth Daily As Needed., Disp: , Rfl: 5    pravastatin (PRAVACHOL) 20 MG tablet, Take 1 tablet by mouth Every Night., Disp: , Rfl: 1    pregabalin (LYRICA) 100 MG capsule, Take 1 capsule by mouth 2 (Two) Times a Day., Disp: , Rfl:     traMADol (ULTRAM) 50 MG tablet, Take 1 tablet by mouth Daily As Needed for Moderate Pain., Disp: , Rfl:     ZETIA 10 MG tablet, Take 1 tablet by mouth Every Night., Disp: , Rfl: 1    zolpidem (AMBIEN) 10 MG tablet, 1 tablet At Night As Needed., Disp: , Rfl: 5      Review of Systems    Review of Systems   Constitutional:  Negative for activity change, appetite change, chills, diaphoresis and fever.   HENT:  Negative for congestion, dental problem, drooling, mouth sores, sinus pressure and sneezing.    Eyes:  Negative for blurred vision, double vision, photophobia and discharge.   Respiratory:  Negative for apnea, cough, choking, chest tightness, shortness of breath and wheezing.    Cardiovascular:  Negative for chest pain, palpitations and leg swelling.   Gastrointestinal:  Negative for abdominal distention, abdominal pain, diarrhea, nausea and vomiting.   Genitourinary:  Negative for dysuria, flank pain and frequency.   Musculoskeletal:  Negative for arthralgias, gait problem, neck pain  "and neck stiffness.   Skin:  Negative for rash.   Neurological:  Negative for dizziness, tremors, seizures, syncope, speech difficulty, numbness, headache and confusion.   Psychiatric/Behavioral:  Negative for agitation, behavioral problems, hallucinations and suicidal ideas.         Objective     Vital Signs:  /91 (BP Location: Right arm, Patient Position: Sitting, Cuff Size: Small Adult)   Pulse 80   Ht 172.7 cm (68\")   Wt 68.3 kg (150 lb 9.6 oz)   SpO2 92%   BMI 22.90 kg/m²   Estimated body mass index is 22.9 kg/m² as calculated from the following:    Height as of this encounter: 172.7 cm (68\").    Weight as of this encounter: 68.3 kg (150 lb 9.6 oz).    Physical Exam  Constitutional:       General: He is not in acute distress.     Appearance: Normal appearance. He is normal weight. He is not ill-appearing, toxic-appearing or diaphoretic.   HENT:      Head: Normocephalic and atraumatic.      Nose: Nose normal. No congestion or rhinorrhea.      Mouth/Throat:      Mouth: Mucous membranes are moist.      Pharynx: Oropharynx is clear.   Eyes:      General: No scleral icterus.     Extraocular Movements: Extraocular movements intact.      Pupils: Pupils are equal, round, and reactive to light.   Neck:      Vascular: No carotid bruit.   Cardiovascular:      Rate and Rhythm: Normal rate and regular rhythm.      Pulses: Normal pulses.      Heart sounds: No murmur heard.  Pulmonary:      Effort: Pulmonary effort is normal. No respiratory distress.      Breath sounds: Normal breath sounds. No stridor. No wheezing, rhonchi or rales.   Chest:      Chest wall: No tenderness.   Abdominal:      General: Abdomen is flat. Bowel sounds are normal. There is no distension.      Palpations: Abdomen is soft.      Tenderness: There is no abdominal tenderness. There is no guarding or rebound.   Musculoskeletal:      Cervical back: Normal range of motion and neck supple. No rigidity or tenderness.   Lymphadenopathy:      " "Cervical: No cervical adenopathy.   Skin:     Coloration: Skin is not jaundiced.      Findings: No lesion or rash.   Neurological:      General: No focal deficit present.      Mental Status: He is alert and oriented to person, place, and time.   Psychiatric:         Mood and Affect: Mood normal.         Behavior: Behavior normal.          Result Review :  The following data was reviewed by Iram Mills MD     Lab Results  Lab Results   Component Value Date    WBC 7.83 11/19/2024    HGB 13.8 11/19/2024    HCT 40.8 11/19/2024    MCV 96.2 11/19/2024     11/19/2024     Lab Results   Component Value Date    GLUCOSE 92 11/19/2024    BUN 19 11/19/2024    CREATININE 1.10 12/11/2024    BCR 21.8 11/19/2024    K 4.5 11/19/2024    CO2 23.8 11/19/2024    CALCIUM 9.3 11/19/2024    ALBUMIN 4.2 11/19/2024    AST 18 11/19/2024    ALT 13 11/19/2024      Lab Results   Component Value Date    CRP 1.45 (H) 10/21/2024        No results found for: \"ACANTHNAEG\", \"AFBCX\", \"BPERTUSSISCX\", \"BLOODCX\"  No results found for: \"BCIDPCR\", \"CXREFLEX\", \"CSFCX\", \"CULTURETIS\"  No results found for: \"CULTURES\", \"HSVCX\", \"URCX\"  No results found for: \"EYECULTURE\", \"GCCX\", \"HSVCULTURE\", \"LABHSV\"  No results found for: \"LEGIONELLA\", \"MRSACX\", \"MUMPSCX\", \"MYCOPLASCX\"  No results found for: \"NOCARDIACX\", \"STOOLCX\"  No results found for: \"THROATCX\", \"UNSTIMCULT\", \"URINECX\", \"CULTURE\", \"VZVCULTUR\"  No results found for: \"VIRALCULTU\", \"WOUNDCX\"    Radiology Results  CT Abdomen Pelvis With Contrast    Result Date: 12/11/2024  Impression:   Question anterior gastric wall thickening versus mixing of contrast and gastric material. This may be further evaluated with upper endoscopy.  This report was finalized on 12/11/2024 3:30 PM by Dr. Jalen Heredia MD.              Assessment / Plan        Diagnoses and all orders for this visit:    1. Fungal infection (Primary)  -     CT Chest Without Contrast Diagnostic; Future  -     Fungitell B-D Glucan; " Future  -     Fungal Antibodies, Quantitative Double Immunodiffusion; Future  -     C-reactive Protein; Future  -     Blastomyces Antibodies; Future    2. Chronic pulmonary blastomycosis  -     CT Chest Without Contrast Diagnostic; Future  -     Fungitell B-D Glucan; Future  -     Fungal Antibodies, Quantitative Double Immunodiffusion; Future  -     C-reactive Protein; Future  -     Blastomyces Antibodies; Future        11/20/24 BAL   Culture Final report Abnormal    Fungus (Mycology) Result 1 Comment Abnormal     Comment: Blastomyces dermatitidis     I am concerned about the bronchoscopy showing growth of Blastomyces dermatitides but it could be colonization especially that patient has no respiratory complaints at this time.  The bronchoscopy showed growth of Candida as well which could be another sign of colonization.  Will proceed with a stat CT scan of the chest which I have ordered to be done stat today as well as blood work to include Fungitell, fungal antibodies, blastomyces antibodies and a CRP level.    Based on results might have to initiate treatment with itraconazole if patient is found to have true pulmonary blastomycosis.      I spent 52 minutes caring for Memo on this date of service. This time includes time spent by me in the following activities:reviewing tests, obtaining and/or reviewing a separately obtained history, performing a medically appropriate examination and/or evaluation , counseling and educating the patient/family/caregiver, ordering medications, tests, or procedures, referring and communicating with other health care professionals , documenting information in the medical record, and independently interpreting results and communicating that information with the patient/family/caregiver    Follow Up   Return in about 2 weeks (around 1/6/2025) for Follow up, With Dr. Mills.    Visit Diagnoses:    ICD-10-CM ICD-9-CM   1. Fungal infection  B49 117.9   2. Chronic pulmonary blastomycosis   B40.1 116.0       Patient was given instructions and counseling regarding his condition or for health maintenance advice. Please see specific information pulled into the AVS if appropriate.     This document has been electronically signed by Iram Mills MD   December 23, 2024 11:41 EST      No orders of the defined types were placed in this encounter.     Dictated Utilizing Dragon Dictation: Part of this note may be an electronic transcription/translation of spoken language to printed text using the Dragon Dictation System.

## 2024-12-26 LAB
A FLAVUS AB SER QL ID: NEGATIVE
A FUMIGATUS AB SER QL ID: NEGATIVE
A NIGER AB SER QL ID: NEGATIVE
B DERMAT AB TITR SER: NEGATIVE {TITER}
B DERMAT AB TITR SER: NEGATIVE {TITER}

## 2024-12-27 LAB
FUNGUS SPEC CULT ORG #2: ABNORMAL
FUNGUS SPEC CULT: ABNORMAL
FUNGUS SPEC FUNGUS STN: ABNORMAL

## 2024-12-30 LAB
DPYD GENE MUT ANL BLD/T: NEGATIVE
FUNGITELL VALUE: <31.25 PG/ML
IMP & REVIEW OF LAB RESULTS: NORMAL
Lab: NORMAL
Lab: NORMAL
PATHOLOGIST NAME: NORMAL
REFERENCE VALUE: NORMAL

## 2025-01-04 LAB
ACID FAST STN SPEC: NEGATIVE
MYCOBACTERIUM SPEC QL CULT: NEGATIVE
SPECIMEN PREPARATION: NORMAL

## 2025-01-23 NOTE — PROGRESS NOTES
I discussed patient's imaging results with them over the phone which showed gastric thickening.  He is currently not having any abdominal pain. I offered referral for endoscopic evaluation. However, he would like to wait until the results of the MRI of the abdomen pelvis prior to being reevaluated by his surgeon or prior to further evaluation for gastric thickening seen on CT of the abdomen.  I also discussed his imaging with radiology who noted that the previous nodules seen medial to the iliac colic anastomosis is stable from his previous imaging and could also possibly represent diverticulum.  No liver lesions are seen on most recent imaging.  They would like to make an appointment with his surgeon to be reevaluated after MRI scans.

## 2025-02-06 NOTE — PROGRESS NOTES
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    7/26/23: Ileocolonoscopy: Inverted mucosa at the appendix biopsy to evaluate for potential atypia, sessile serrated tissue demonstrated on previous biopsy fungating from the base of the cecum and extending in proximity to the ileocecal valve.  Cecal neoplasia with recommendation for ileocecal resection to remove for diagnostic and therapeutic benefit  8/18/23: CT A/P: No evidence of obstruction nor inflammation.  There is a 3.8 x 2.3 cm masslike area within the posterior inferior sacrum near the appendiceal orifice presumably representing patient's cecal mass, otherwise no metastatic            disease seen.  8/28/23: Right ileocolic resection: Invasive moderately differentiated adenocarcinoma extending to the serosal surface, pT4a, 0/16 lymph nodes negative, surgical margins negative. Tumor size: 2.2 cm, G2 moderately differentiated, invades visceral peritoneum, tumor perforation not identified, lymph-vascular invasion not identified, perineural invasion not identified. Distance from closest proximal mucosal margin is 4 cm. Large sessile serrated adenoma, negative for dysplasia or carcinoma, multiple benign lymph nodes, surgical margins are free of dysplasia.  5/1/24: CT A/P due to worsening abdominal pain showed markedly abnormal appearance of the cecum. Tiny amount of extraluminal air in this region. There are postoperative changes in this region. He was evaluated by Dr. Paul with surgery who recommended outpatient management and was prescribed Augmentin for diverticulitis with  "close follow up.   6/28/24: Ileocolonoscopy: No evidence of neoplasia in the ileocolic region noting CT with \"abnormal appearance of the cecum\" and previous resection of T4 N0 cancer.  Dense diverticular changes in the left and sigmoid colon.  Internal hemorrhoids.  7/5/24: Was evaluated in the ED for abdominal pain. CT A/P showed a moderate abnormal appearance of the cecum is again noted with moderate surrounding inflammatory changes, fluid, and mild mural colon wall thickening suggestive of acute severe diverticulitis/focal colitis. Increased gaseous distention seen throughout the large and small bowel suggesting a component of ileus, there is hyper enhancing soft tissue nodule adjacent to this region of inflammatory change measuring 8 mm which may reflect a reactive mesenteric lymph node. He was treated with Augmentin for diverticulitis.   9/3/24: CT A/P: Interval increase in size of small enhancing nodule medial to the ileocolic anastomosis.  This now measures 11 x 11 mm and measured less than 5 mm on the study from 5 months prior.  This is consistent with doubling over 5 months and concerning for malignancy.  10/16/24: PET/CT: No hypermetabolic masses or nodule seen in the area of the iliac colic anastomosis no hypermetabolic abdominal or pelvic adenopathy and right hemicolectomy changes seen. Relatively increased hypermetabolic activity involving the right posterior hepatic lobe central aspect greater than the remainder of the background.  Hypermetabolic mediastinal and hilar adenopathy following in the range of malignancy.    He was referred to pulmonary due to findings on PET/CT and bronchoscopy was performed by Dr. Tate on 11/20/24 which was negative for malignancy. 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 Mills who felt that this might be colonization and repeat CT chest was negative as well as workup. CEA is normal.   12/11/24: CT A/P: Question anterior gastric " wall thickening versus mixing of contrast and gastric material. This may be further evaluated with upper endoscopy.     Mr. Steven has had worsening constipation since his colon surgery but denies of any blood in the stool. He denies of any weight changes, shortness of breath, abdominal pain.      Interim History:  Mr. Steven denies of any changes since his last visit.  He presents today to follow-up on imaging results.            The following portions of the patient's history were reviewed and updated as appropriate: allergies, current medications, past family history, past medical history, past social history, past surgical history and problem list.    Allergies   Allergen Reactions    Aspirin GI Intolerance    Nsaids GI Intolerance       Past Medical History:   Diagnosis Date    Anxiety disorder     Arthritis     Colon polyps     Depression     Diverticulitis     Fibromyalgia     High blood pressure     High cholesterol     High triglycerides     Panic attacks     Thyroid disorder    Fibromylagia       Past Surgical History:   Procedure Laterality Date    BRONCHOSCOPY N/A 11/20/2024    Procedure: BRONCHOSCOPY WITH ENDOBRONCHIAL ULTRASOUND;  Surgeon: Heriberto Tate MD;  Location:  [facility];  Service: Pulmonary;  Laterality: N/A;    CHOLECYSTECTOMY  2017    COLON RESECTION N/A 8/29/2023    Procedure: ILEOCOLIC RESECTION  LAPAROSCOPIC;  Surgeon: Guido Etienne MD;  Location:  [facility];  Service: General;  Laterality: N/A;    COLONOSCOPY      LARYNX SURGERY      NECK SURGERY      disc fusion         Social History     Socioeconomic History    Marital status:    Tobacco Use    Smoking status: Never     Passive exposure: Never    Smokeless tobacco: Never   Vaping Use    Vaping status: Never Used   Substance and Sexual Activity    Alcohol use: No    Drug use: No    Sexual activity: Not Currently     Partners: Female     Birth control/protection: Post-menopausal         Family History   Problem Relation Age of  Onset    Breast cancer Sister     Prostate cancer Brother     Heart disease Brother     Diabetes Mother     Hypertension Mother            Current Outpatient Medications:     ALPRAZolam (XANAX) 1 MG tablet, Take 1 tablet by mouth 3 (Three) Times a Day As Needed., Disp: , Rfl: 5    amLODIPine (NORVASC) 2.5 MG tablet, Take 1 tablet by mouth Daily., Disp: , Rfl: 1    cetirizine (zyrTEC) 10 MG tablet, Take 1 tablet by mouth Daily As Needed., Disp: , Rfl: 3    Cholecalciferol (Vitamin D3) 50 MCG (2000 UT) capsule, Take 1 capsule by mouth Daily., Disp: , Rfl:     cycloSPORINE (RESTASIS) 0.05 % ophthalmic emulsion, 1 drop 2 (Two) Times a Day., Disp: , Rfl:     escitalopram (LEXAPRO) 20 MG tablet, Take 2 tablets by mouth Every Morning., Disp: , Rfl: 5    fluticasone (VERAMYST) 27.5 MCG/SPRAY nasal spray, Administer 2 sprays into the nostril(s) as directed by provider Daily., Disp: , Rfl:     levothyroxine sodium (TIROSINT) 50 MCG capsule, Take 1 capsule by mouth Every Morning., Disp: , Rfl: 1    Linzess 72 MCG capsule capsule, Take 1 capsule by mouth Every Morning Before Breakfast., Disp: , Rfl:     OLANZapine zydis (zyPREXA) 20 MG disintegrating tablet, Place 1 tablet on the tongue Every Night., Disp: , Rfl:     polyethylene glycol (MIRALAX) powder, Take 17 g by mouth Daily As Needed., Disp: , Rfl: 5    pravastatin (PRAVACHOL) 20 MG tablet, Take 1 tablet by mouth Every Night., Disp: , Rfl: 1    pregabalin (LYRICA) 100 MG capsule, Take 1 capsule by mouth 2 (Two) Times a Day., Disp: , Rfl:     traMADol (ULTRAM) 50 MG tablet, Take 1 tablet by mouth Daily As Needed for Moderate Pain., Disp: , Rfl:     ZETIA 10 MG tablet, Take 1 tablet by mouth Every Night., Disp: , Rfl: 1    zolpidem (AMBIEN) 10 MG tablet, 1 tablet At Night As Needed., Disp: , Rfl: 5  No current facility-administered medications for this visit.          Review of Systems  Pertinent positives are listed as per history of present of illness, all other systems  reviewed and are negative. No complaints.         Physical Exam  Vital Signs: These were reviewed and listed as per patient’s electronic medical chart  Vitals:    02/10/25 1406   BP: 105/74   Pulse: 86   Resp: 18   Temp: 97.3 °F (36.3 °C)   SpO2: 96%     General: Patient is awake, alert, and in no acute distress. Normal mood.   Head: Normocephalic, atraumatic  Eyes: EOMI. Conjunctivae and sclerae normal.  Ears: Ears appear intact with no abnormalities noted.   Neck: Trachea midline. No obvious JVD.  Lungs: Respirations appear to be regular, even and unlabored with no signs of respiratory distress. No audible wheezing.  Abdomen: No obvious abdominal distension.  MS: Muscle tone appears normal. No gross deformities.  Extremities: No clubbing, cyanosis or edema noted.  Skin: No visible bleeding, bruising, or rash.  Neurologic: Alert and oriented x3. No gross focal deficits.              Pain Score:  Pain Score    02/10/25 1406   PainSc:   3               PHQ-Score Total:  PHQ-9 Total Score:          PROCEDURES:  7/14/17 6/23/23 7/26/23 6/28/24            IMAGING:  CT Abdomen Pelvis With Contrast (08/18/2023 15:56)   Findings:LUNG BASES:  Unremarkable without mass or infiltrate.LIVER:  Unremarkable parenchyma without focal lesion. BILIARY/GALLBLADDER: Cholecystectomy  SPLEEN:  Unremarkable. PANCREAS:  Unremarkable. ADRENAL:  Unremarkable.KIDNEYS:  Unremarkable parenchyma with no solid mass identified. No obstruction.  No calculus identified.GASTROINTESTINAL/MESENTERY:  No evidence of obstruction nor inflammation.  There is a 3.8 x 2.3 cm masslike area within the posterior inferior sacrum near the appendiceal orifice presumably representing patient's cecal mass (image 86, series 2).MESENTERIC VESSELS:  Patent.AORTA/IVC:  Normal caliber.RETROPERITONEUM/LYMPH NODES:  Unremarkable. REPRODUCTIVE:  Unremarkable. BLADDER:  Unremarkable. OSSEUS STRUCTURES:  Typical for age with no acute process  identified.  IMPRESSION: 1.No evidence of metastatic disease within the abdomen or pelvis.2.Vague cecal masslike area presumably representing patient's primary diagnosis.     CT Abdomen Pelvis With Contrast (05/01/2024 13:59)   FINDINGS:ABDOMEN: There is mild right basilar atelectasis. The gallbladder is absent. There is no hydronephrosis or nephrolithiasis. The solid organs are otherwise unremarkable. There is no free fluid or adenopathy.PELVIS: There are postoperative changes in the region of the cecum. There is marked surrounding fluid, inflammation, stranding and possible soft tissue. There is a tiny amount of extraluminal air in this region. The urinary bladder is unremarkable. There is no adenopathy.   IMPRESSION: Markedly abnormal appearance of the cecum. There are postoperative changes in this region. Surrounding fluid and stranding is present. A tiny amount of extraluminal air is noted. This may be postoperative in nature. Alternatively, this may be inflammatory, infectious or neoplastic in nature.     CT Abdomen Pelvis With Contrast (07/05/2024 20:51)   FINDINGS: LOWER CHEST: The heart is normal in size. Basal atelectasis, right greater than left..ABDOMEN/PELVIS: Liver, gallbladder and bile ducts: The liver enhances homogenously without suspicious focal hepatic lesion. Prior cholecystectomy. No significant biliary ductal dilatation.Adrenal glands: The adrenal glands are morphologically unremarkable without suspicious lesion.Kidneys, ureters and urinary bladder: No suspicious renal lesions. No hydronephrosis. Unremarkable urinary   bladder. Spleen: The spleen is normal in size. Pancreas: The pancreas is unremarkable. Gastrointestinal system and mesentery: There is no evidence of bowel obstruction. Postoperative changes of the sacrum are again noted. Appendix is surgically absent. Moderate abnormal appearance of the cecum is again noted with moderate surrounding inflammatory changes, moderate surrounding  fluid and mild mural thickening of the colonic wall within this region. Findings are suggestive of acute severe diverticulitis/focal colitis. No definite evidence to suggest perforation. There is increased gaseous distention seen throughout the large and small bowel suggesting a component of ileus. There is a hyperenhancing soft tissue nodule adjacent to this region of inflammatory change measuring 8 mm which may reflect a reactive mesenteric lymph node (series 2, image 53). Neoplastic involvement is not excluded. No significant mesenteric inflammation. Lymph nodes: No pathologically enlarged abdominal or pelvic lymph nodes are present. Vessels: The abdominal aorta is normal in caliber. The celiac trunk, superior mesenteric artery, inferior mesenteric artery and their branch vessels appear grossly patent. The superior mesenteric vein, splenic vein and main portal veins are patent. The inferior vena cava and hepatic veins   are unremarkable. Peritoneum: No free intraperitoneal fluid or pneumoperitoneum. Pelvic viscera: No acute findings. Body wall: No acute findings. No significant body wall hernias. Bones: No acute fracture.   IMPRESSION: Similar appearance to the cecum and adjacent large bowel loops on today's study with appearance suggestive of acute focal diverticulitis/colitis. Significant surrounding fluid and inflammatory change. No definite evidence of gross perforation. Adjacent 8 mm soft tissue density may reflect a reactive lymph node, however, neoplastic metastatic theo involvement is not excluded. Increased gaseous distention of the bowel on today's study appears most consistent with a component of ileus.     CT Abdomen Pelvis With Contrast (09/03/2024 16:04)   FINDINGS: There are dependent densities in the lung bases consistent with atelectasis. The gallbladder is surgically absent. There is no biliary ductal dilation. The spleen, adrenal glands, and pancreas are unremarkable. The kidneys demonstrate  no stones or hydronephrosis. There is a lower pole right renal 1 cm hypodensity most consistent with a cyst. This is unchanged from the prior study. There are trace vascular calcifications of the aorta. There is no aneurysm. The urinary bladder is unremarkable. The GI tract demonstrates no obstruction. There is increased gas within the stomach. The small bowel is unremarkable. There is no bowel obstruction. The patient is status post ileocecectomy with anastomosis. Significant inflammatory changes seen on the prior study have resolved. A previously described small enhancing nodule medial to the anastomosis now measures 11 x 11 mm and previously measured 8 x 8.5 mm. This measures less than 5 mm on a study from 5 months prior. This   has essentially doubled in 5 months which is concerning for malignancy. A reactive/inflammatory node is still considered in the differential. There is no other mesenteric or retroperitoneal adenopathy identified. There is moderate pandiverticulosis predominantly involving the ascending, transverse, and descending colon. There is sparing of the sigmoid colon. There is no free air, free fluid or inflammatory process.   IMPRESSION: 1. Resolved inflammatory changes in the right lower quadrant. 2. No obstruction. 3. Moderate pandiverticulosis without diverticulitis. 4. Interval increase in size of a small enhancing nodule medial to the ileocolic anastomosis. This now measures 11 x 11 mm and measured less than 5 mm on the study from 5 months prior. This is consistent with doubling over 5 months. This is concerning for malignancy. 5. There is no free air, free fluid or inflammatory process.     NM PET/CT Skull Base to Mid Thigh (10/16/2024 11:15)   FINDINGS:HEAD:Brain and extra-axial spaces:  Unremarkable as visualized.Nasopharynx:  Unremarkable as visualized.NECK:Hypopharynx:  Unremarkable as visualized.Larynx:  Unremarkable as visualized.Trachea:  Unremarkable as visualized.Retropharyngeal  space:  Unremarkable as visualized.Submandibular/parotid glands:  Unremarkable as visualized.  Glands are normal in size.  Thyroid:  Unremarkable as visualized.  No enlarged or calcified nodules. CHEST:Lungs and pleural spaces:  Unremarkable as visualized.  No mass.  No consolidation.  No significant effusion.  No pneumothorax.  Heart:  Unremarkable as visualized.  No cardiomegaly.  No significant pericardial effusion.  Mediastinum:  Unremarkable as visualized.  No mass. ABDOMEN:  Liver:  Focus of relatively increased FDG hypermetabolic activity  involving the right posterior hepatic lobe central aspect greater than the remainder of background hepatic FDG hypermetabolic activity with maximum SUV: 4.0. Consider further evaluation with dedicated liver imaging either CT, MRI, or ultrasound.Gallbladder and bile ducts:  Unremarkable as visualized.  No calcified stones.  No ductal dilation.  Pancreas:  Unremarkable as visualized.  No ductal dilation.  Spleen:  Unremarkable as visualized.  No splenomegaly.  Adrenals:  Unremarkable as visualized.  No mass.  Kidneys and ureters:  Unremarkable as visualized.  Stomach and bowel:  Right hemicolectomy changes are noted.  Colonic diverticulosis without diverticulitis.  No obstruction.  No FDG hypermetabolic masses or nodules are seen in the area of ileocolic anastomosis.PELVIS: Appendix:  See above. Bladder:  Unremarkable as visualized. Reproductive:  Prostate enlargement.WHOLE BODY: Intraperitoneal space:  Unremarkable as visualized.  No significant fluid collection.  No free air.  Bones/joints:  Unremarkable as visualized.  No acute fracture.  No dislocation.  Soft tissues:  Unremarkable as visualized.  Vasculature:  Unremarkable as visualized.  No aortic aneurysm.  Lymph nodes:  1.5 cm left infrahilar lymph node or central pulmonary nodule with marked FDG hypermetabolism and maximum SUV: 7.1.  Left  suprahilar node with FDG hypermetabolism and maximum SUV: 6.2.  A right middle  hilar node with FDG hypermetabolism with maximum SUV: 4.4.  No FDG hypermetabolic abdominal or pelvic adenopathy.  IMPRESSION:1.  No FDG hypermetabolic masses or nodules are seen in the area of ileocolic anastomosis.2.  No FDG hypermetabolic abdominal or pelvic adenopathy.  3.  Right hemicolectomy changes are noted.4.  Focus of relatively increased FDG hypermetabolic activity involving the right posterior hepatic lobe central aspect greater than the remainder of background hepatic FDG hypermetabolic activity with maximum SUV: 4.0. Consider further evaluation with dedicated liver imaging either CT, MRI, or ultrasound. 5. FDG hypermetabolic mediastinal and hilar adenopathy with maximum SUV values that fall within the range of malignancy. 6. 1.5 cm left infrahilar lymph node or central pulmonary nodule with marked FDG hypermetabolism and maximum SUV: 7.1.7.  Left suprahilar node with FDG hypermetabolism and maximum SUV: 6.2.  8.  A right middle hilar node with FDG hypermetabolism with maximum SUV:4.4.    MRI Abdomen With & Without Contrast (11/02/2024 16:16)   FINDINGS:Lung bases:  Unremarkable as visualized.  No mass.  No consolidation.Liver:  Tiny T2 hyperintense lesion right posterior hepatic lobe that is 0.45 cm and is too small to characterize enhancement characteristics but demonstrates restricted diffusion with corresponding low signal on ADC map which increase suspicion for possible metastasis. The liver is otherwise normal throughout. Refer to axial DWI image #70; axial postcontrast sequence image #33; axial T2 sequence image #13.  Gallbladder and bile ducts:  Unremarkable as visualized.  No calcified stones.  No ductal dilation.  Pancreas:  Unremarkable as visualized.  No ductal dilation.  No mass.  Spleen:  Unremarkable as visualized.  No splenomegaly.  Adrenals:  Unremarkable as visualized.  No mass.  Kidneys and ureters:  Simple cyst right and left kidneys.  No  hydronephrosis.  Stomach and bowel:  Right  hemicolectomy changes are noted.  Colonic diverticulosis without diverticulitis.  No obstruction.  Intraperitoneal space:  Unremarkable as visualized.  No significant fluid collection.  Soft tissues:  Unremarkable as visualized.  Vasculature:  Unremarkable as visualized.  No abdominal aortic  aneurysm.  Lymph nodes:  Unremarkable as visualized.  No enlarged lymph nodes.  IMPRESSION:1. Tiny T2 hyperintense lesion right posterior hepatic lobe that is 0.45 cm and is too small to characterize enhancement characteristics but demonstrates restricted diffusion with corresponding low signal on ADC map which increase suspicion for possible metastasis. The liver is otherwise normal throughout. Refer to axial DWI image #70; axial postcontrast sequence image #33; axial T2 sequence image #13. This localizes to the same hepatic segment noted with focus of hypermetabolism on previous PET/CT. 2. No evidence of adenopathy. Otherwise unremarkable exam.    MRI Pelvis With & Without Contrast (11/02/2024 16:17)   FINDINGS:Bowel:  Pelvic portions of the GI tract appear within normal limits except for sigmoid diverticulosis. No diverticulitis.  No obstruction.  No mucosal thickening.  Intraperitoneal space:  Unremarkable as visualized.  No free air.  No free fluid.  Bladder:  Unremarkable as visualized.  No stones.  No mass.  Prostate:  Prostate enlargement with central hyperplasia constituting changes of BPH.  Seminal vesicles:  Unremarkable as visualized.  No nodule or cyst.  Bones/joints:  Unremarkable as visualized.  No dislocation.  No acute bony findings.  Soft tissues:  Right trochanteric bursitis and adjacent edema of the gluteus mary carmen muscle possibly low-grade strain.  Vasculature:  Unremarkable as visualized.  No lower abdominal aortic aneurysm.  Lymph nodes:  Unremarkable as visualized.  No iliac adenopathy identified.  No lower retroperitoneal adenopathy identified.  IMPRESSION:1.  No pelvic adenopathy identified based on  MRI size criteria.2.  Prostate enlargement with central hyperplasia constituting changes of BPH.3.  Right trochanteric bursitis and adjacent edema of the gluteus mary carmen muscle possibly low-grade strain.4. Other incidental/nonacute findings above.    CT Abdomen Pelvis With Contrast (12/11/2024 15:19)   FINDINGS:LUNG BASES:  Unremarkable as visualized.  No mass.  No consolidation.ABDOMEN:LIVER:  Unremarkable as visualized.  No mass.GALLBLADDER AND BILE DUCTS:  Cholecystectomy clips.  No ductal dilation.  PANCREAS:  Unremarkable as visualized.  No mass.  No ductal dilation.  SPLEEN:  Unremarkable as visualized.  No splenomegaly.  ADRENALS:  Unremarkable as visualized.  No mass.  KIDNEYS AND URETERS:  Unremarkable as visualized.  No solid mass.  No  hydronephrosis.  STOMACH AND BOWEL:  Question anterior gastric wall thickening versus mixing of contrast and gastric material.  Moderate colonic stool. Scattered diverticula in the colon.  No diverticulitis.  No obstruction.PELVIS: APPENDIX:  No findings to suggest acute appendicitis. BLADDER:  Unremarkable as visualized.  No mass.  REPRODUCTIVE:  Unremarkable as visualized. ABDOMEN and PELVIS:  INTRAPERITONEAL SPACE:  Unremarkable as visualized.  No free air.  No  significant fluid collection.  BONES/JOINTS:  No acute fracture.  No dislocation.  SOFT TISSUES:  Unremarkable as visualized.  VASCULATURE:  Unremarkable as visualized.  No abdominal aortic aneurysm.  LYMPH NODES:  Unremarkable as visualized.  No enlarged lymph nodes.  IMPRESSION:  Question anterior gastric wall thickening versus mixing of contrast and gastric material. This may be further evaluated with upper endoscopy.    CT Chest Without Contrast Diagnostic (12/23/2024 12:16)   FINDINGS:LIMITATIONS:  Please note that reported measurements are made manually, as computer generated (AI) measurements can over measure lesions. Additionally, lesions identified by AI may have been present presently, significant  nodules will be discussed in the report and the visual depictions of lesions provided by AI are for general reference only.  LUNGS AND PLEURAL SPACES:  Unremarkable as visualized.  No mass.  No consolidation.  No pneumothorax.  No significant effusion.  HEART:  Unremarkable as visualized.  No cardiomegaly.  No significant pericardial effusion.  No significant coronary artery calcifications.  BONES/JOINTS:  Unremarkable as visualized.  No acute fracture.  No dislocation.  SOFT TISSUES:  Unremarkable as visualized.  VASCULATURE:  Atherosclerotic disease.  No thoracic aortic aneurysm.  LYMPH NODES:  Unremarkable as visualized.  No enlarged lymph nodes.  IMPRESSION:No evidence of fungal disease.        LABS/STUDIES:   Hospital Outpatient Visit on 02/09/2025   Component Date Value    Creatinine 02/09/2025 1.00    Office Visit on 12/23/2024   Component Date Value    Result 12/23/2024 Negative     Fungitell Value 12/23/2024 <31.25     Reference Value 12/23/2024 Comment     Interpretation 12/23/2024 Notes     Clinical Relevance 12/23/2024 Notes     Disclaimer 12/23/2024 Notes     Electronically signed by: 12/23/2024 Comment     Aspergillus fumigatus 12/23/2024 Negative     Aspergillus flavus 12/23/2024 Negative     Aspergillus niger 12/23/2024 Negative     Blastomyces Abs, Qn, DID 12/23/2024 Negative     C-Reactive Protein 12/23/2024 0.34     Blastomyces Abs, Qn, DID 12/23/2024 Negative    Hospital Outpatient Visit on 12/11/2024   Component Date Value    Creatinine 12/11/2024 1.10    Admission on 11/20/2024, Discharged on 11/20/2024   Component Date Value    Fungus Stain 11/20/2024 Final report     KOH/Calcofluor preparati* 11/20/2024 Comment     Culture 11/20/2024 Final report (A)     Fungus (Mycology) Result* 11/20/2024 Comment (A)     AFB Specimen Processing 11/20/2024 Concentration     Acid Fast Smear 11/20/2024 Negative     Culture 11/20/2024 Negative     BAL Culture 11/20/2024 No growth     Gram Stain 11/20/2024 WBCs  seen     Gram Stain 2024 Gram positive cocci     Reference Lab Report 2024                      Value:[facility]  [address] [place] [address]  Phone: [phone] or [phone]  Fax: [phone]  Rajesh Rodriguez M.D., Medical Director    PATIENT NAME                                    LABORATORY NO.  [id number]   MERLINE GUO                                  [id number]  [id number]                                  AGE                  SEX      SSN            CLIENT REF #  [facility] CRISTINA                       79        1945            [id number]    [id number]  [address]                              REQUESTING M.D.         ATTENDING M.D..         [place] [address]                            THAPA, EFRAIN  DATE COLLECTED          DATE RECEIVED           DATE REPORTED  2024    DIAGNOSIS:  A.       BRONCHIAL LAVAGE, RIGHT MIDDLE LOBE:  Negative for malignant cells.  B.       BRONCHIAL LAVAGE, LINGULA:  Negative for malignant cells.  C.       BRONCH WASH:  Negative                           for malignant cells.    MICROSCOPIC DESCRIPTION:  A.     Many histiocytes and few bronchial cells are present.  B.     Bronchial cells and pulmonary macrophages are present.  C.     Pulmonary macrophages, Inflammatory cells and squamous cells are present.    Professional interpretation rendered by Staci Aguirre D.O., F.C.A.P. at [facility], [place], [address] [place] [address], [place] [address].    CLINICAL HISTORY:  Mediastinal lymphadenopathy      SPECIMENS SUBMITTED:  A.    BRONCHIAL LAVAGE , RIGHT MIDDLE LOBE  B.    BRONCHIAL LAVAGE , LINGULA  C.    BRONCH WASH    GROSS SPECIMEN DESCRIPTION:  A.     35 ccs of clear, colorless fluid, scant mucoid sediment, received in fixative  Cell block has been examined.  B.     40 ccs of clear, colorless fluid, scant sediment, received in fixative  Automated paraffin  embedded cell block has been examined.  C.     45 ccs of hazy, colorless fluid, with mucoid sediment, received in fixative  Cell block has been examined.    CYTOTECHNOLOGIST:         MIKE MENARD (ASCP)                       REVIEWED, DIAGNOSED AND ELECTRONICALLY  SIGNED BY:    Staci Aguirre D.O., VERONICA.  CPT CODES:  88112x3, 88305x3      Fungus Stain 11/20/2024 Final report     KOH/Calcofluor preparati* 11/20/2024 Comment     Culture 11/20/2024 Final report (A)     Fungus (Mycology) Result* 11/20/2024 Comment (A)     AFB Specimen Processing 11/20/2024 Concentration     Acid Fast Smear 11/20/2024 Negative     Culture 11/20/2024 Negative     BAL Culture 11/20/2024 No growth     Gram Stain 11/20/2024 WBCs seen     Gram Stain 11/20/2024 No organisms seen     Fungus Stain 11/20/2024 Final report     KOH/Calcofluor preparati* 11/20/2024 Comment     Culture 11/20/2024 Final report (A)     Fungus (Mycology) Result* 11/20/2024 Candida albicans (A)     Fungus (Mycology) Result* 11/20/2024 Comment (A)     AFB Specimen Processing 11/20/2024 Concentration     Acid Fast Smear 11/20/2024 Negative     Culture 11/20/2024 Negative     Respiratory Culture 11/20/2024 Light growth (2+) Normal respiratory amanuel. No S. aureus or Pseudomonas aeruginosa detected. Final report.     Gram Stain 11/20/2024 Many (4+) WBCs seen     Gram Stain 11/20/2024 Few (2+) Gram positive cocci in pairs and clusters     Gram Stain 11/20/2024 Rare (1+) Gram positive bacilli     Gram Stain 11/20/2024 Rare (1+) Gram negative bacilli    Lab on 11/19/2024   Component Date Value    Glucose 11/19/2024 92     BUN 11/19/2024 19     Creatinine 11/19/2024 0.87     Sodium 11/19/2024 138     Potassium 11/19/2024 4.5     Chloride 11/19/2024 103     CO2 11/19/2024 23.8     Calcium 11/19/2024 9.3     Total Protein 11/19/2024 7.2     Albumin 11/19/2024 4.2     ALT (SGPT) 11/19/2024 13     AST (SGOT) 11/19/2024 18     Alkaline Phosphatase  11/19/2024 60     Total Bilirubin 11/19/2024 0.4     Globulin 11/19/2024 3.0     A/G Ratio 11/19/2024 1.4     BUN/Creatinine Ratio 11/19/2024 21.8     Anion Gap 11/19/2024 11.2     eGFR 11/19/2024 88.3     WBC 11/19/2024 7.83     RBC 11/19/2024 4.24     Hemoglobin 11/19/2024 13.8     Hematocrit 11/19/2024 40.8     MCV 11/19/2024 96.2     MCH 11/19/2024 32.5     MCHC 11/19/2024 33.8     RDW 11/19/2024 13.2     RDW-SD 11/19/2024 46.6     MPV 11/19/2024 11.8     Platelets 11/19/2024 194     Neutrophil % 11/19/2024 69.3     Lymphocyte % 11/19/2024 19.0 (L)     Monocyte % 11/19/2024 10.2     Eosinophil % 11/19/2024 0.4     Basophil % 11/19/2024 0.6     Immature Grans % 11/19/2024 0.5     Neutrophils, Absolute 11/19/2024 5.42     Lymphocytes, Absolute 11/19/2024 1.49     Monocytes, Absolute 11/19/2024 0.80     Eosinophils, Absolute 11/19/2024 0.03     Basophils, Absolute 11/19/2024 0.05     Immature Grans, Absolute 11/19/2024 0.04     nRBC 11/19/2024 0.0     Protime 11/19/2024 13.5     INR 11/19/2024 1.02     PTT 11/19/2024 26.5    Consult on 10/21/2024   Component Date Value    Glucose 10/21/2024 167 (H)     BUN 10/21/2024 17     Creatinine 10/21/2024 0.94     Sodium 10/21/2024 140     Potassium 10/21/2024 4.3     Chloride 10/21/2024 102     CO2 10/21/2024 26.9     Calcium 10/21/2024 9.3     Total Protein 10/21/2024 7.2     Albumin 10/21/2024 4.1     ALT (SGPT) 10/21/2024 14     AST (SGOT) 10/21/2024 17     Alkaline Phosphatase 10/21/2024 70     Total Bilirubin 10/21/2024 0.3     Globulin 10/21/2024 3.1     A/G Ratio 10/21/2024 1.3     BUN/Creatinine Ratio 10/21/2024 18.1     Anion Gap 10/21/2024 11.1     eGFR 10/21/2024 83.0     CEA 10/21/2024 1.56     C-Reactive Protein 10/21/2024 1.45 (H)     WBC 10/21/2024 7.42     RBC 10/21/2024 4.24     Hemoglobin 10/21/2024 13.8     Hematocrit 10/21/2024 40.7     MCV 10/21/2024 96.0     MCH 10/21/2024 32.5     MCHC 10/21/2024 33.9     RDW 10/21/2024 13.1     RDW-SD 10/21/2024 46.0      MPV 10/21/2024 11.2     Platelets 10/21/2024 198     Neutrophil % 10/21/2024 72.9     Lymphocyte % 10/21/2024 17.7 (L)     Monocyte % 10/21/2024 7.7     Eosinophil % 10/21/2024 0.5     Basophil % 10/21/2024 0.7     Immature Grans % 10/21/2024 0.5     Neutrophils, Absolute 10/21/2024 5.41     Lymphocytes, Absolute 10/21/2024 1.31     Monocytes, Absolute 10/21/2024 0.57     Eosinophils, Absolute 10/21/2024 0.04     Basophils, Absolute 10/21/2024 0.05     Immature Grans, Absolute 10/21/2024 0.04     nRBC 10/21/2024 0.0          PATHOLOGY:   8/29/23 11/20/24          Assessment & Plan   Memo Steven is a very pleasant 79 y.o.  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    Colon adenocarcinoma  Mediastinal/hilar adenopathy  - Patient was diagnosed with a T4a, N0 colon adenocarcinoma in August 2023 after he had a right ileocolic resection performed by Dr. Etienne. At the end of April 2024 he began experiencing recurrent abdominal pain that was felt to be related to diverticulitis and would resolve with antibiotic treatment. He also had a repeat colonoscopy in June 2024 with Dr. Etienne that did not reveal any evidence of malignancy. However repeat CT imaging in September 2024 showed an interval increase in size in nodules that are medial to the ileocolic anastomosis, recently measuring 11 x 11 mm and doubling over 5 months which was felt to be concerning for malignancy. PET/CT obtained October 2024 did not reveal any hypermetabolic masses or nodules seen in the area of the iliac colic anastomosis and no hypermetabolic abdominal or pelvic adenopathy changes were seen. However there was increased hypermetabolic mediastinal and hilar adenopathy following in the range of malignancy.    - Given CT findings obtained MRI imaging to further evaluate nodules seen near anastomosis site. MRI significant for a tiny T2 hyperintense  lesion right posterior hepatic lobe that is 0.45 cm and is too small to characterize suspicion for possible metastasis. The liver is otherwise normal throughout. I discussed imaging with radiology who felt that it was too small to biopsy and they also did not see any enhancing nodules medial to the ileocolic anastomosis however recommended repeating CT imaging with oral and IV contrast in one month.   - Patient was also referred to pulmonary for possible bronchoscopy due to PET/CT findings and this was performed by Dr. Tate on 11/20/24 which was negative for malignancy. 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 Mills who felt that this might be colonization and repeat CT chest was negative. CEA is normal.   - He had a CT A/P in December 2024 which I  discussed imaging results with them over the phone as imaging had showed gastric thickening. I offered referral for endoscopic evaluation. However, he wanted to wait until the results of the MRI of the abdomen pelvis prior to being reevaluated by his surgeon. I also discussed his imaging with radiology who noted that the previous nodules seen medial to the iliac colic anastomosis is stable from his previous imaging and could also possibly represent diverticulum. No liver lesions were seen on most recent imaging.    - MRI A/P was obtained on 2/9/25 which showed a single focus of T2 hyperintensity posterior right lobe of the liver unchanged from the previous exam and there is no evidence of interval development of new metastatic disease to the abdomen and there is mild thickening of the sigmoid colon wall that is nonspecific otherwise no adenopathy, soft tissue masses, or free fluid identified.  - Given gastric and sigmoid thickening seen on imaging I recommended that he be re-evaluated by Dr. Etienne for endoscopic evaluation. Will request appointment for patient.   - Will repeat CT imaging in three months.     ACO / KAYKAY/Other   Quality measures  -  Memo Steven  reports that he has never smoked. He has never been exposed to tobacco smoke. He has never used smokeless tobacco.   -  Memo Steven received 2024 flu vaccine.  -  Memo Steven reports a pain score of 3.  Given his pain assessment as noted, treatment options were discussed and the following options were decided upon as a follow-up plan to address the patient's pain: continuation of current treatment plan for pain.  -  Current outpatient and discharge medications have been reconciled for the patient.  Reviewed by: Ila Monterroso MD      I will have the patient return in follow up appointment to review test results in three months with CT C/A/P with oral and IV contrast. He understands that should he have any questions or concerns prior to his appointment he should give us a call at any time and I would be happy to see him sooner. It was a pleasure to see this patient in clinic today, thank you for allowing me to participate in the care of this patient.      Ila Monterroso MD   02/10/25   14:37 EST

## 2025-02-09 ENCOUNTER — HOSPITAL ENCOUNTER (OUTPATIENT)
Dept: MRI IMAGING | Facility: HOSPITAL | Age: 80
Discharge: HOME | End: 2025-02-09
Payer: MEDICARE

## 2025-02-09 DIAGNOSIS — C18.9 MALIGNANT NEOPLASM OF COLON, UNSPECIFIED PART OF COLON: ICD-10-CM

## 2025-02-09 LAB — CREAT BLDA-MCNC: 1 MG/DL (ref 0.6–1.3)

## 2025-02-09 PROCEDURE — A9577 INJ MULTIHANCE: HCPCS | Performed by: INTERNAL MEDICINE

## 2025-02-09 PROCEDURE — 72196 MRI PELVIS W/DYE: CPT

## 2025-02-09 PROCEDURE — 74182 MRI ABDOMEN W/CONTRAST: CPT

## 2025-02-09 PROCEDURE — 25510000002 GADOBENATE DIMEGLUMINE 529 MG/ML SOLUTION: Performed by: INTERNAL MEDICINE

## 2025-02-09 PROCEDURE — 82565 ASSAY OF CREATININE: CPT

## 2025-02-09 RX ADMIN — GADOBENATE DIMEGLUMINE 13 ML: 529 INJECTION, SOLUTION INTRAVENOUS at 16:25

## 2025-02-10 ENCOUNTER — OFFICE VISIT (OUTPATIENT)
Dept: ONCOLOGY | Facility: CLINIC | Age: 80
End: 2025-02-10
Payer: MEDICARE

## 2025-02-10 VITALS
HEART RATE: 86 BPM | WEIGHT: 146.2 LBS | OXYGEN SATURATION: 96 % | BODY MASS INDEX: 22.23 KG/M2 | SYSTOLIC BLOOD PRESSURE: 105 MMHG | TEMPERATURE: 97.3 F | RESPIRATION RATE: 18 BRPM | DIASTOLIC BLOOD PRESSURE: 74 MMHG

## 2025-02-10 DIAGNOSIS — R59.0 MEDIASTINAL ADENOPATHY: Primary | ICD-10-CM

## 2025-02-10 DIAGNOSIS — C18.9 MALIGNANT NEOPLASM OF COLON, UNSPECIFIED PART OF COLON: ICD-10-CM

## 2025-02-10 PROCEDURE — 74182 MRI ABDOMEN W/CONTRAST: CPT | Performed by: RADIOLOGY

## 2025-02-10 PROCEDURE — 72196 MRI PELVIS W/DYE: CPT | Performed by: RADIOLOGY

## 2025-02-10 NOTE — LETTER
February 10, 2025     Guido Etienne MD  [address] [place] [address]    Patient: Memo Steven   YOB: 1945   Date of Visit: 2/10/2025     Dear Guido Etienne MD:       Thank you for referring Memo Steven to me for evaluation. Below are the relevant portions of my assessment and plan of care.    If you have questions, please do not hesitate to call me. I look forward to following Memo along with you.         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    7/26/23: Ileocolonoscopy: Inverted mucosa at the appendix biopsy to evaluate for potential atypia, sessile serrated tissue demonstrated on previous biopsy fungating from the base of the cecum and extending in proximity to the ileocecal valve.  Cecal neoplasia with recommendation for ileocecal resection to remove for diagnostic and therapeutic benefit  8/18/23: CT A/P: No evidence of obstruction nor inflammation.  There is a 3.8 x 2.3 cm masslike area within the posterior inferior sacrum near the appendiceal orifice presumably representing patient's cecal mass, otherwise no metastatic            disease seen.  8/28/23: Right ileocolic resection: Invasive moderately differentiated adenocarcinoma extending to the serosal surface, pT4a, 0/16 lymph nodes negative, surgical margins negative. Tumor size: 2.2 cm, G2 moderately differentiated, invades visceral peritoneum, tumor perforation not identified, lymph-vascular invasion not  "identified, perineural invasion not identified. Distance from closest proximal mucosal margin is 4 cm. Large sessile serrated adenoma, negative for dysplasia or carcinoma, multiple benign lymph nodes, surgical margins are free of dysplasia.  5/1/24: CT A/P due to worsening abdominal pain showed markedly abnormal appearance of the cecum. Tiny amount of extraluminal air in this region. There are postoperative changes in this region. He was evaluated by Dr. Paul with surgery who recommended outpatient management and was prescribed Augmentin for diverticulitis with close follow up.   6/28/24: Ileocolonoscopy: No evidence of neoplasia in the ileocolic region noting CT with \"abnormal appearance of the cecum\" and previous resection of T4 N0 cancer.  Dense diverticular changes in the left and sigmoid colon.  Internal hemorrhoids.  7/5/24: Was evaluated in the ED for abdominal pain. CT A/P showed a moderate abnormal appearance of the cecum is again noted with moderate surrounding inflammatory changes, fluid, and mild mural colon wall thickening suggestive of acute severe diverticulitis/focal colitis. Increased gaseous distention seen throughout the large and small bowel suggesting a component of ileus, there is hyper enhancing soft tissue nodule adjacent to this region of inflammatory change measuring 8 mm which may reflect a reactive mesenteric lymph node. He was treated with Augmentin for diverticulitis.   9/3/24: CT A/P: Interval increase in size of small enhancing nodule medial to the ileocolic anastomosis.  This now measures 11 x 11 mm and measured less than 5 mm on the study from 5 months prior.  This is consistent with doubling over 5 months and concerning for malignancy.  10/16/24: PET/CT: No hypermetabolic masses or nodule seen in the area of the iliac colic anastomosis no hypermetabolic abdominal or pelvic adenopathy and right hemicolectomy changes seen. Relatively increased hypermetabolic activity involving the " right posterior hepatic lobe central aspect greater than the remainder of the background.  Hypermetabolic mediastinal and hilar adenopathy following in the range of malignancy.    He was referred to pulmonary due to findings on PET/CT and bronchoscopy was performed by Dr. Tate on 11/20/24 which was negative for malignancy. 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 Mills who felt that this might be colonization and repeat CT chest was negative as well as workup. CEA is normal.   12/11/24: CT A/P: Question anterior gastric wall thickening versus mixing of contrast and gastric material. This may be further evaluated with upper endoscopy.     Mr. Steven has had worsening constipation since his colon surgery but denies of any blood in the stool. He denies of any weight changes, shortness of breath, abdominal pain.      Interim History:  Mr. Steven denies of any changes since his last visit.  He presents today to follow-up on imaging results.            The following portions of the patient's history were reviewed and updated as appropriate: allergies, current medications, past family history, past medical history, past social history, past surgical history and problem list.    Allergies   Allergen Reactions   • Aspirin GI Intolerance   • Nsaids GI Intolerance       Past Medical History:   Diagnosis Date   • Anxiety disorder    • Arthritis    • Colon polyps    • Depression    • Diverticulitis    • Fibromyalgia    • High blood pressure    • High cholesterol    • High triglycerides    • Panic attacks    • Thyroid disorder    Fibromylagia       Past Surgical History:   Procedure Laterality Date   • BRONCHOSCOPY N/A 11/20/2024    Procedure: BRONCHOSCOPY WITH ENDOBRONCHIAL ULTRASOUND;  Surgeon: Heriberto Tate MD;  Location: [facility];  Service: Pulmonary;  Laterality: N/A;   • CHOLECYSTECTOMY  2017   • COLON RESECTION N/A 8/29/2023    Procedure: ILEOCOLIC RESECTION  LAPAROSCOPIC;   Surgeon: Guido Etienne MD;  Location[facility];  Service: General;  Laterality: N/A;   • COLONOSCOPY     • LARYNX SURGERY     • NECK SURGERY      disc fusion         Social History     Socioeconomic History   • Marital status:    Tobacco Use   • Smoking status: Never     Passive exposure: Never   • Smokeless tobacco: Never   Vaping Use   • Vaping status: Never Used   Substance and Sexual Activity   • Alcohol use: No   • Drug use: No   • Sexual activity: Not Currently     Partners: Female     Birth control/protection: Post-menopausal         Family History   Problem Relation Age of Onset   • Breast cancer Sister    • Prostate cancer Brother    • Heart disease Brother    • Diabetes Mother    • Hypertension Mother            Current Outpatient Medications:   •  ALPRAZolam (XANAX) 1 MG tablet, Take 1 tablet by mouth 3 (Three) Times a Day As Needed., Disp: , Rfl: 5  •  amLODIPine (NORVASC) 2.5 MG tablet, Take 1 tablet by mouth Daily., Disp: , Rfl: 1  •  cetirizine (zyrTEC) 10 MG tablet, Take 1 tablet by mouth Daily As Needed., Disp: , Rfl: 3  •  Cholecalciferol (Vitamin D3) 50 MCG (2000 UT) capsule, Take 1 capsule by mouth Daily., Disp: , Rfl:   •  cycloSPORINE (RESTASIS) 0.05 % ophthalmic emulsion, 1 drop 2 (Two) Times a Day., Disp: , Rfl:   •  escitalopram (LEXAPRO) 20 MG tablet, Take 2 tablets by mouth Every Morning., Disp: , Rfl: 5  •  fluticasone (VERAMYST) 27.5 MCG/SPRAY nasal spray, Administer 2 sprays into the nostril(s) as directed by provider Daily., Disp: , Rfl:   •  levothyroxine sodium (TIROSINT) 50 MCG capsule, Take 1 capsule by mouth Every Morning., Disp: , Rfl: 1  •  Linzess 72 MCG capsule capsule, Take 1 capsule by mouth Every Morning Before Breakfast., Disp: , Rfl:   •  OLANZapine zydis (zyPREXA) 20 MG disintegrating tablet, Place 1 tablet on the tongue Every Night., Disp: , Rfl:   •  polyethylene glycol (MIRALAX) powder, Take 17 g by mouth Daily As Needed., Disp: , Rfl: 5  •  pravastatin  (PRAVACHOL) 20 MG tablet, Take 1 tablet by mouth Every Night., Disp: , Rfl: 1  •  pregabalin (LYRICA) 100 MG capsule, Take 1 capsule by mouth 2 (Two) Times a Day., Disp: , Rfl:   •  traMADol (ULTRAM) 50 MG tablet, Take 1 tablet by mouth Daily As Needed for Moderate Pain., Disp: , Rfl:   •  ZETIA 10 MG tablet, Take 1 tablet by mouth Every Night., Disp: , Rfl: 1  •  zolpidem (AMBIEN) 10 MG tablet, 1 tablet At Night As Needed., Disp: , Rfl: 5  No current facility-administered medications for this visit.          Review of Systems  Pertinent positives are listed as per history of present of illness, all other systems reviewed and are negative. No complaints.         Physical Exam  Vital Signs: These were reviewed and listed as per patient’s electronic medical chart  Vitals:    02/10/25 1406   BP: 105/74   Pulse: 86   Resp: 18   Temp: 97.3 °F (36.3 °C)   SpO2: 96%     General: Patient is awake, alert, and in no acute distress. Normal mood.   Head: Normocephalic, atraumatic  Eyes: EOMI. Conjunctivae and sclerae normal.  Ears: Ears appear intact with no abnormalities noted.   Neck: Trachea midline. No obvious JVD.  Lungs: Respirations appear to be regular, even and unlabored with no signs of respiratory distress. No audible wheezing.  Abdomen: No obvious abdominal distension.  MS: Muscle tone appears normal. No gross deformities.  Extremities: No clubbing, cyanosis or edema noted.  Skin: No visible bleeding, bruising, or rash.  Neurologic: Alert and oriented x3. No gross focal deficits.              Pain Score:  Pain Score    02/10/25 1406   PainSc:   3               PHQ-Score Total:  PHQ-9 Total Score:          PROCEDURES:  7/14/17 6/23/23 7/26/23 6/28/24            IMAGING:  CT Abdomen Pelvis With Contrast (08/18/2023 15:56)   Findings:LUNG BASES:  Unremarkable without mass or infiltrate.LIVER:  Unremarkable parenchyma without focal lesion. BILIARY/GALLBLADDER: Cholecystectomy  SPLEEN:  Unremarkable.  PANCREAS:  Unremarkable. ADRENAL:  Unremarkable.KIDNEYS:  Unremarkable parenchyma with no solid mass identified. No obstruction.  No calculus identified.GASTROINTESTINAL/MESENTERY:  No evidence of obstruction nor inflammation.  There is a 3.8 x 2.3 cm masslike area within the posterior inferior sacrum near the appendiceal orifice presumably representing patient's cecal mass (image 86, series 2).MESENTERIC VESSELS:  Patent.AORTA/IVC:  Normal caliber.RETROPERITONEUM/LYMPH NODES:  Unremarkable. REPRODUCTIVE:  Unremarkable. BLADDER:  Unremarkable. OSSEUS STRUCTURES:  Typical for age with no acute process identified.  IMPRESSION: 1.No evidence of metastatic disease within the abdomen or pelvis.2.Vague cecal masslike area presumably representing patient's primary diagnosis.     CT Abdomen Pelvis With Contrast (05/01/2024 13:59)   FINDINGS:ABDOMEN: There is mild right basilar atelectasis. The gallbladder is absent. There is no hydronephrosis or nephrolithiasis. The solid organs are otherwise unremarkable. There is no free fluid or adenopathy.PELVIS: There are postoperative changes in the region of the cecum. There is marked surrounding fluid, inflammation, stranding and possible soft tissue. There is a tiny amount of extraluminal air in this region. The urinary bladder is unremarkable. There is no adenopathy.   IMPRESSION: Markedly abnormal appearance of the cecum. There are postoperative changes in this region. Surrounding fluid and stranding is present. A tiny amount of extraluminal air is noted. This may be postoperative in nature. Alternatively, this may be inflammatory, infectious or neoplastic in nature.     CT Abdomen Pelvis With Contrast (07/05/2024 20:51)   FINDINGS: LOWER CHEST: The heart is normal in size. Basal atelectasis, right greater than left..ABDOMEN/PELVIS: Liver, gallbladder and bile ducts: The liver enhances homogenously without suspicious focal hepatic lesion. Prior cholecystectomy. No significant  biliary ductal dilatation.Adrenal glands: The adrenal glands are morphologically unremarkable without suspicious lesion.Kidneys, ureters and urinary bladder: No suspicious renal lesions. No hydronephrosis. Unremarkable urinary   bladder. Spleen: The spleen is normal in size. Pancreas: The pancreas is unremarkable. Gastrointestinal system and mesentery: There is no evidence of bowel obstruction. Postoperative changes of the sacrum are again noted. Appendix is surgically absent. Moderate abnormal appearance of the cecum is again noted with moderate surrounding inflammatory changes, moderate surrounding fluid and mild mural thickening of the colonic wall within this region. Findings are suggestive of acute severe diverticulitis/focal colitis. No definite evidence to suggest perforation. There is increased gaseous distention seen throughout the large and small bowel suggesting a component of ileus. There is a hyperenhancing soft tissue nodule adjacent to this region of inflammatory change measuring 8 mm which may reflect a reactive mesenteric lymph node (series 2, image 53). Neoplastic involvement is not excluded. No significant mesenteric inflammation. Lymph nodes: No pathologically enlarged abdominal or pelvic lymph nodes are present. Vessels: The abdominal aorta is normal in caliber. The celiac trunk, superior mesenteric artery, inferior mesenteric artery and their branch vessels appear grossly patent. The superior mesenteric vein, splenic vein and main portal veins are patent. The inferior vena cava and hepatic veins   are unremarkable. Peritoneum: No free intraperitoneal fluid or pneumoperitoneum. Pelvic viscera: No acute findings. Body wall: No acute findings. No significant body wall hernias. Bones: No acute fracture.   IMPRESSION: Similar appearance to the cecum and adjacent large bowel loops on today's study with appearance suggestive of acute focal diverticulitis/colitis. Significant surrounding fluid and  inflammatory change. No definite evidence of gross perforation. Adjacent 8 mm soft tissue density may reflect a reactive lymph node, however, neoplastic metastatic theo involvement is not excluded. Increased gaseous distention of the bowel on today's study appears most consistent with a component of ileus.     CT Abdomen Pelvis With Contrast (09/03/2024 16:04)   FINDINGS: There are dependent densities in the lung bases consistent with atelectasis. The gallbladder is surgically absent. There is no biliary ductal dilation. The spleen, adrenal glands, and pancreas are unremarkable. The kidneys demonstrate no stones or hydronephrosis. There is a lower pole right renal 1 cm hypodensity most consistent with a cyst. This is unchanged from the prior study. There are trace vascular calcifications of the aorta. There is no aneurysm. The urinary bladder is unremarkable. The GI tract demonstrates no obstruction. There is increased gas within the stomach. The small bowel is unremarkable. There is no bowel obstruction. The patient is status post ileocecectomy with anastomosis. Significant inflammatory changes seen on the prior study have resolved. A previously described small enhancing nodule medial to the anastomosis now measures 11 x 11 mm and previously measured 8 x 8.5 mm. This measures less than 5 mm on a study from 5 months prior. This   has essentially doubled in 5 months which is concerning for malignancy. A reactive/inflammatory node is still considered in the differential. There is no other mesenteric or retroperitoneal adenopathy identified. There is moderate pandiverticulosis predominantly involving the ascending, transverse, and descending colon. There is sparing of the sigmoid colon. There is no free air, free fluid or inflammatory process.   IMPRESSION: 1. Resolved inflammatory changes in the right lower quadrant. 2. No obstruction. 3. Moderate pandiverticulosis without diverticulitis. 4. Interval increase in size  of a small enhancing nodule medial to the ileocolic anastomosis. This now measures 11 x 11 mm and measured less than 5 mm on the study from 5 months prior. This is consistent with doubling over 5 months. This is concerning for malignancy. 5. There is no free air, free fluid or inflammatory process.     NM PET/CT Skull Base to Mid Thigh (10/16/2024 11:15)   FINDINGS:HEAD:Brain and extra-axial spaces:  Unremarkable as visualized.Nasopharynx:  Unremarkable as visualized.NECK:Hypopharynx:  Unremarkable as visualized.Larynx:  Unremarkable as visualized.Trachea:  Unremarkable as visualized.Retropharyngeal space:  Unremarkable as visualized.Submandibular/parotid glands:  Unremarkable as visualized.  Glands are normal in size.  Thyroid:  Unremarkable as visualized.  No enlarged or calcified nodules. CHEST:Lungs and pleural spaces:  Unremarkable as visualized.  No mass.  No consolidation.  No significant effusion.  No pneumothorax.  Heart:  Unremarkable as visualized.  No cardiomegaly.  No significant pericardial effusion.  Mediastinum:  Unremarkable as visualized.  No mass. ABDOMEN:  Liver:  Focus of relatively increased FDG hypermetabolic activity  involving the right posterior hepatic lobe central aspect greater than the remainder of background hepatic FDG hypermetabolic activity with maximum SUV: 4.0. Consider further evaluation with dedicated liver imaging either CT, MRI, or ultrasound.Gallbladder and bile ducts:  Unremarkable as visualized.  No calcified stones.  No ductal dilation.  Pancreas:  Unremarkable as visualized.  No ductal dilation.  Spleen:  Unremarkable as visualized.  No splenomegaly.  Adrenals:  Unremarkable as visualized.  No mass.  Kidneys and ureters:  Unremarkable as visualized.  Stomach and bowel:  Right hemicolectomy changes are noted.  Colonic diverticulosis without diverticulitis.  No obstruction.  No FDG hypermetabolic masses or nodules are seen in the area of ileocolic anastomosis.PELVIS:  Appendix:  See above. Bladder:  Unremarkable as visualized. Reproductive:  Prostate enlargement.WHOLE BODY: Intraperitoneal space:  Unremarkable as visualized.  No significant fluid collection.  No free air.  Bones/joints:  Unremarkable as visualized.  No acute fracture.  No dislocation.  Soft tissues:  Unremarkable as visualized.  Vasculature:  Unremarkable as visualized.  No aortic aneurysm.  Lymph nodes:  1.5 cm left infrahilar lymph node or central pulmonary nodule with marked FDG hypermetabolism and maximum SUV: 7.1.  Left  suprahilar node with FDG hypermetabolism and maximum SUV: 6.2.  A right middle hilar node with FDG hypermetabolism with maximum SUV: 4.4.  No FDG hypermetabolic abdominal or pelvic adenopathy.  IMPRESSION:1.  No FDG hypermetabolic masses or nodules are seen in the area of ileocolic anastomosis.2.  No FDG hypermetabolic abdominal or pelvic adenopathy.  3.  Right hemicolectomy changes are noted.4.  Focus of relatively increased FDG hypermetabolic activity involving the right posterior hepatic lobe central aspect greater than the remainder of background hepatic FDG hypermetabolic activity with maximum SUV: 4.0. Consider further evaluation with dedicated liver imaging either CT, MRI, or ultrasound. 5. FDG hypermetabolic mediastinal and hilar adenopathy with maximum SUV values that fall within the range of malignancy. 6. 1.5 cm left infrahilar lymph node or central pulmonary nodule with marked FDG hypermetabolism and maximum SUV: 7.1.7.  Left suprahilar node with FDG hypermetabolism and maximum SUV: 6.2.  8.  A right middle hilar node with FDG hypermetabolism with maximum SUV:4.4.    MRI Abdomen With & Without Contrast (11/02/2024 16:16)   FINDINGS:Lung bases:  Unremarkable as visualized.  No mass.  No consolidation.Liver:  Tiny T2 hyperintense lesion right posterior hepatic lobe that is 0.45 cm and is too small to characterize enhancement characteristics but demonstrates restricted diffusion  with corresponding low signal on ADC map which increase suspicion for possible metastasis. The liver is otherwise normal throughout. Refer to axial DWI image #70; axial postcontrast sequence image #33; axial T2 sequence image #13.  Gallbladder and bile ducts:  Unremarkable as visualized.  No calcified stones.  No ductal dilation.  Pancreas:  Unremarkable as visualized.  No ductal dilation.  No mass.  Spleen:  Unremarkable as visualized.  No splenomegaly.  Adrenals:  Unremarkable as visualized.  No mass.  Kidneys and ureters:  Simple cyst right and left kidneys.  No  hydronephrosis.  Stomach and bowel:  Right hemicolectomy changes are noted.  Colonic diverticulosis without diverticulitis.  No obstruction.  Intraperitoneal space:  Unremarkable as visualized.  No significant fluid collection.  Soft tissues:  Unremarkable as visualized.  Vasculature:  Unremarkable as visualized.  No abdominal aortic  aneurysm.  Lymph nodes:  Unremarkable as visualized.  No enlarged lymph nodes.  IMPRESSION:1. Tiny T2 hyperintense lesion right posterior hepatic lobe that is 0.45 cm and is too small to characterize enhancement characteristics but demonstrates restricted diffusion with corresponding low signal on ADC map which increase suspicion for possible metastasis. The liver is otherwise normal throughout. Refer to axial DWI image #70; axial postcontrast sequence image #33; axial T2 sequence image #13. This localizes to the same hepatic segment noted with focus of hypermetabolism on previous PET/CT. 2. No evidence of adenopathy. Otherwise unremarkable exam.    MRI Pelvis With & Without Contrast (11/02/2024 16:17)   FINDINGS:Bowel:  Pelvic portions of the GI tract appear within normal limits except for sigmoid diverticulosis. No diverticulitis.  No obstruction.  No mucosal thickening.  Intraperitoneal space:  Unremarkable as visualized.  No free air.  No free fluid.  Bladder:  Unremarkable as visualized.  No stones.  No mass.   Prostate:  Prostate enlargement with central hyperplasia constituting changes of BPH.  Seminal vesicles:  Unremarkable as visualized.  No nodule or cyst.  Bones/joints:  Unremarkable as visualized.  No dislocation.  No acute bony findings.  Soft tissues:  Right trochanteric bursitis and adjacent edema of the gluteus mary carmen muscle possibly low-grade strain.  Vasculature:  Unremarkable as visualized.  No lower abdominal aortic aneurysm.  Lymph nodes:  Unremarkable as visualized.  No iliac adenopathy identified.  No lower retroperitoneal adenopathy identified.  IMPRESSION:1.  No pelvic adenopathy identified based on MRI size criteria.2.  Prostate enlargement with central hyperplasia constituting changes of BPH.3.  Right trochanteric bursitis and adjacent edema of the gluteus mary carmen muscle possibly low-grade strain.4. Other incidental/nonacute findings above.    CT Abdomen Pelvis With Contrast (12/11/2024 15:19)   FINDINGS:LUNG BASES:  Unremarkable as visualized.  No mass.  No consolidation.ABDOMEN:LIVER:  Unremarkable as visualized.  No mass.GALLBLADDER AND BILE DUCTS:  Cholecystectomy clips.  No ductal dilation.  PANCREAS:  Unremarkable as visualized.  No mass.  No ductal dilation.  SPLEEN:  Unremarkable as visualized.  No splenomegaly.  ADRENALS:  Unremarkable as visualized.  No mass.  KIDNEYS AND URETERS:  Unremarkable as visualized.  No solid mass.  No  hydronephrosis.  STOMACH AND BOWEL:  Question anterior gastric wall thickening versus mixing of contrast and gastric material.  Moderate colonic stool. Scattered diverticula in the colon.  No diverticulitis.  No obstruction.PELVIS: APPENDIX:  No findings to suggest acute appendicitis. BLADDER:  Unremarkable as visualized.  No mass.  REPRODUCTIVE:  Unremarkable as visualized. ABDOMEN and PELVIS:  INTRAPERITONEAL SPACE:  Unremarkable as visualized.  No free air.  No  significant fluid collection.  BONES/JOINTS:  No acute fracture.  No dislocation.  SOFT TISSUES:   Unremarkable as visualized.  VASCULATURE:  Unremarkable as visualized.  No abdominal aortic aneurysm.  LYMPH NODES:  Unremarkable as visualized.  No enlarged lymph nodes.  IMPRESSION:  Question anterior gastric wall thickening versus mixing of contrast and gastric material. This may be further evaluated with upper endoscopy.    CT Chest Without Contrast Diagnostic (12/23/2024 12:16)   FINDINGS:LIMITATIONS:  Please note that reported measurements are made manually, as computer generated (AI) measurements can over measure lesions. Additionally, lesions identified by AI may have been present presently, significant nodules will be discussed in the report and the visual depictions of lesions provided by AI are for general reference only.  LUNGS AND PLEURAL SPACES:  Unremarkable as visualized.  No mass.  No consolidation.  No pneumothorax.  No significant effusion.  HEART:  Unremarkable as visualized.  No cardiomegaly.  No significant pericardial effusion.  No significant coronary artery calcifications.  BONES/JOINTS:  Unremarkable as visualized.  No acute fracture.  No dislocation.  SOFT TISSUES:  Unremarkable as visualized.  VASCULATURE:  Atherosclerotic disease.  No thoracic aortic aneurysm.  LYMPH NODES:  Unremarkable as visualized.  No enlarged lymph nodes.  IMPRESSION:No evidence of fungal disease.        LABS/STUDIES:   Hospital Outpatient Visit on 02/09/2025   Component Date Value   • Creatinine 02/09/2025 1.00    Office Visit on 12/23/2024   Component Date Value   • Result 12/23/2024 Negative    • Fungitell Value 12/23/2024 <31.25    • Reference Value 12/23/2024 Comment    • Interpretation 12/23/2024 Notes    • Clinical Relevance 12/23/2024 Notes    • Disclaimer 12/23/2024 Notes    • Electronically signed by: 12/23/2024 Comment    • Aspergillus fumigatus 12/23/2024 Negative    • Aspergillus flavus 12/23/2024 Negative    • Aspergillus niger 12/23/2024 Negative    • Blastomyces Abs, Qn, DID 12/23/2024 Negative    •  C-Reactive Protein 2024 0.34    • Blastomyces Abs, Qn, DID 2024 Negative    Hospital Outpatient Visit on 2024   Component Date Value   • Creatinine 2024 1.10    Admission on 2024, Discharged on 2024   Component Date Value   • Fungus Stain 2024 Final report    • KOH/Calcofluor preparati* 2024 Comment    • Culture 2024 Final report (A)    • Fungus (Mycology) Result* 2024 Comment (A)    • AFB Specimen Processing 2024 Concentration    • Acid Fast Smear 2024 Negative    • Culture 2024 Negative    • BAL Culture 2024 No growth    • Gram Stain 2024 WBCs seen    • Gram Stain 2024 Gram positive cocci    • Reference Lab Report 2024                      Value:[facility]  [address] [place] [address]  Phone: [phone] or [phone]  Fax: [phone]  Rajesh Rodriguez M.D., Medical Director    PATIENT NAME                                    LABORATORY NO.  [id number]   MERLINE GUO                                  [id number]  [id number]                                  AGE                  SEX      N            CLIENT REF #  [facility] CRISTINA                       79        1945            [id number]    [id number]  [address]                              REQUESTING M.D.         ATTENDING M.D..         [place] [address]                            EFRAIN THAPA  DATE COLLECTED          DATE RECEIVED           DATE REPORTED  2024    DIAGNOSIS:  A.       BRONCHIAL LAVAGE, RIGHT MIDDLE LOBE:  Negative for malignant cells.  B.       BRONCHIAL LAVAGE, LINGULA:  Negative for malignant cells.  C.       BRONCH WASH:  Negative                           for malignant cells.    MICROSCOPIC DESCRIPTION:  A.     Many histiocytes and few bronchial cells are present.  B.     Bronchial cells and pulmonary macrophages are  present.  C.     Pulmonary macrophages, Inflammatory cells and squamous cells are present.    Professional interpretation rendered by Staci Aguirre D.O., TESSA at [facility] [employer], [address] [place] [address].    CLINICAL HISTORY:  Mediastinal lymphadenopathy      SPECIMENS SUBMITTED:  A.    BRONCHIAL LAVAGE , RIGHT MIDDLE LOBE  B.    BRONCHIAL LAVAGE , LINGULA  C.    BRONCH WASH    GROSS SPECIMEN DESCRIPTION:  A.     35 ccs of clear, colorless fluid, scant mucoid sediment, received in fixative  Cell block has been examined.  B.     40 ccs of clear, colorless fluid, scant sediment, received in fixative  Automated paraffin embedded cell block has been examined.  C.     45 ccs of hazy, colorless fluid, with mucoid sediment, received in fixative  Cell block has been examined.    CYTOTECHNOLOGIST:         MIKE MENARD (ASCP)                       REVIEWED, DIAGNOSED AND ELECTRONICALLY  SIGNED BY:    Staci Aguirre D.O., TESSA  CPT CODES:  88112x3, 88305x3     • Fungus Stain 11/20/2024 Final report    • KOH/Calcofluor preparati* 11/20/2024 Comment    • Culture 11/20/2024 Final report (A)    • Fungus (Mycology) Result* 11/20/2024 Comment (A)    • AFB Specimen Processing 11/20/2024 Concentration    • Acid Fast Smear 11/20/2024 Negative    • Culture 11/20/2024 Negative    • BAL Culture 11/20/2024 No growth    • Gram Stain 11/20/2024 WBCs seen    • Gram Stain 11/20/2024 No organisms seen    • Fungus Stain 11/20/2024 Final report    • KOH/Calcofluor preparati* 11/20/2024 Comment    • Culture 11/20/2024 Final report (A)    • Fungus (Mycology) Result* 11/20/2024 Candida albicans (A)    • Fungus (Mycology) Result* 11/20/2024 Comment (A)    • AFB Specimen Processing 11/20/2024 Concentration    • Acid Fast Smear 11/20/2024 Negative    • Culture 11/20/2024 Negative    • Respiratory Culture 11/20/2024 Light growth (2+) Normal respiratory amanuel. No S. aureus or Pseudomonas aeruginosa  detected. Final report.    • Gram Stain 11/20/2024 Many (4+) WBCs seen    • Gram Stain 11/20/2024 Few (2+) Gram positive cocci in pairs and clusters    • Gram Stain 11/20/2024 Rare (1+) Gram positive bacilli    • Gram Stain 11/20/2024 Rare (1+) Gram negative bacilli    Lab on 11/19/2024   Component Date Value   • Glucose 11/19/2024 92    • BUN 11/19/2024 19    • Creatinine 11/19/2024 0.87    • Sodium 11/19/2024 138    • Potassium 11/19/2024 4.5    • Chloride 11/19/2024 103    • CO2 11/19/2024 23.8    • Calcium 11/19/2024 9.3    • Total Protein 11/19/2024 7.2    • Albumin 11/19/2024 4.2    • ALT (SGPT) 11/19/2024 13    • AST (SGOT) 11/19/2024 18    • Alkaline Phosphatase 11/19/2024 60    • Total Bilirubin 11/19/2024 0.4    • Globulin 11/19/2024 3.0    • A/G Ratio 11/19/2024 1.4    • BUN/Creatinine Ratio 11/19/2024 21.8    • Anion Gap 11/19/2024 11.2    • eGFR 11/19/2024 88.3    • WBC 11/19/2024 7.83    • RBC 11/19/2024 4.24    • Hemoglobin 11/19/2024 13.8    • Hematocrit 11/19/2024 40.8    • MCV 11/19/2024 96.2    • MCH 11/19/2024 32.5    • MCHC 11/19/2024 33.8    • RDW 11/19/2024 13.2    • RDW-SD 11/19/2024 46.6    • MPV 11/19/2024 11.8    • Platelets 11/19/2024 194    • Neutrophil % 11/19/2024 69.3    • Lymphocyte % 11/19/2024 19.0 (L)    • Monocyte % 11/19/2024 10.2    • Eosinophil % 11/19/2024 0.4    • Basophil % 11/19/2024 0.6    • Immature Grans % 11/19/2024 0.5    • Neutrophils, Absolute 11/19/2024 5.42    • Lymphocytes, Absolute 11/19/2024 1.49    • Monocytes, Absolute 11/19/2024 0.80    • Eosinophils, Absolute 11/19/2024 0.03    • Basophils, Absolute 11/19/2024 0.05    • Immature Grans, Absolute 11/19/2024 0.04    • nRBC 11/19/2024 0.0    • Protime 11/19/2024 13.5    • INR 11/19/2024 1.02    • PTT 11/19/2024 26.5    Consult on 10/21/2024   Component Date Value   • Glucose 10/21/2024 167 (H)    • BUN 10/21/2024 17    • Creatinine 10/21/2024 0.94    • Sodium 10/21/2024 140    • Potassium 10/21/2024 4.3    •  Chloride 10/21/2024 102    • CO2 10/21/2024 26.9    • Calcium 10/21/2024 9.3    • Total Protein 10/21/2024 7.2    • Albumin 10/21/2024 4.1    • ALT (SGPT) 10/21/2024 14    • AST (SGOT) 10/21/2024 17    • Alkaline Phosphatase 10/21/2024 70    • Total Bilirubin 10/21/2024 0.3    • Globulin 10/21/2024 3.1    • A/G Ratio 10/21/2024 1.3    • BUN/Creatinine Ratio 10/21/2024 18.1    • Anion Gap 10/21/2024 11.1    • eGFR 10/21/2024 83.0    • CEA 10/21/2024 1.56    • C-Reactive Protein 10/21/2024 1.45 (H)    • WBC 10/21/2024 7.42    • RBC 10/21/2024 4.24    • Hemoglobin 10/21/2024 13.8    • Hematocrit 10/21/2024 40.7    • MCV 10/21/2024 96.0    • MCH 10/21/2024 32.5    • MCHC 10/21/2024 33.9    • RDW 10/21/2024 13.1    • RDW-SD 10/21/2024 46.0    • MPV 10/21/2024 11.2    • Platelets 10/21/2024 198    • Neutrophil % 10/21/2024 72.9    • Lymphocyte % 10/21/2024 17.7 (L)    • Monocyte % 10/21/2024 7.7    • Eosinophil % 10/21/2024 0.5    • Basophil % 10/21/2024 0.7    • Immature Grans % 10/21/2024 0.5    • Neutrophils, Absolute 10/21/2024 5.41    • Lymphocytes, Absolute 10/21/2024 1.31    • Monocytes, Absolute 10/21/2024 0.57    • Eosinophils, Absolute 10/21/2024 0.04    • Basophils, Absolute 10/21/2024 0.05    • Immature Grans, Absolute 10/21/2024 0.04    • nRBC 10/21/2024 0.0          PATHOLOGY:   8/29/23 11/20/24          Assessment & Plan  Memo Steven is a very pleasant 79 y.o.  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    Colon adenocarcinoma  Mediastinal/hilar adenopathy  - Patient was diagnosed with a T4a, N0 colon adenocarcinoma in August 2023 after he had a right ileocolic resection performed by Dr. Etienne. At the end of April 2024 he began experiencing recurrent abdominal pain that was felt to be related to diverticulitis and would resolve with antibiotic treatment. He also had a repeat colonoscopy in June 2024 with  Dr. Etienne that did not reveal any evidence of malignancy. However repeat CT imaging in September 2024 showed an interval increase in size in nodules that are medial to the ileocolic anastomosis, recently measuring 11 x 11 mm and doubling over 5 months which was felt to be concerning for malignancy. PET/CT obtained October 2024 did not reveal any hypermetabolic masses or nodules seen in the area of the iliac colic anastomosis and no hypermetabolic abdominal or pelvic adenopathy changes were seen. However there was increased hypermetabolic mediastinal and hilar adenopathy following in the range of malignancy.    - Given CT findings obtained MRI imaging to further evaluate nodules seen near anastomosis site. MRI significant for a tiny T2 hyperintense lesion right posterior hepatic lobe that is 0.45 cm and is too small to characterize suspicion for possible metastasis. The liver is otherwise normal throughout. I discussed imaging with radiology who felt that it was too small to biopsy and they also did not see any enhancing nodules medial to the ileocolic anastomosis however recommended repeating CT imaging with oral and IV contrast in one month.   - Patient was also referred to pulmonary for possible bronchoscopy due to PET/CT findings and this was performed by Dr. Tate on 11/20/24 which was negative for malignancy. 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 Mills who felt that this might be colonization and repeat CT chest was negative. CEA is normal.   - He had a CT A/P in December 2024 which I  discussed imaging results with them over the phone as imaging had showed gastric thickening. I offered referral for endoscopic evaluation. However, he wanted to wait until the results of the MRI of the abdomen pelvis prior to being reevaluated by his surgeon. I also discussed his imaging with radiology who noted that the previous nodules seen medial to the iliac colic anastomosis is  stable from his previous imaging and could also possibly represent diverticulum. No liver lesions were seen on most recent imaging.    - MRI A/P was obtained on 2/9/25 which showed a single focus of T2 hyperintensity posterior right lobe of the liver unchanged from the previous exam and there is no evidence of interval development of new metastatic disease to the abdomen and there is mild thickening of the sigmoid colon wall that is nonspecific otherwise no adenopathy, soft tissue masses, or free fluid identified.  - Given gastric and sigmoid thickening seen on imaging I recommended that he be re-evaluated by Dr. Etienne for endoscopic evaluation. Will request appointment for patient.   - Will repeat CT imaging in three months.     ACO / KAYKAY/Other  Quality measures  -  Memo Steven  reports that he has never smoked. He has never been exposed to tobacco smoke. He has never used smokeless tobacco.   -  Memo Steven received 2024 flu vaccine.  -  Memo Steven reports a pain score of 3.  Given his pain assessment as noted, treatment options were discussed and the following options were decided upon as a follow-up plan to address the patient's pain: continuation of current treatment plan for pain.  -  Current outpatient and discharge medications have been reconciled for the patient.  Reviewed by: Ila Monterroso MD      I will have the patient return in follow up appointment to review test results in three months with CT C/A/P with oral and IV contrast. He understands that should he have any questions or concerns prior to his appointment he should give us a call at any time and I would be happy to see him sooner. It was a pleasure to see this patient in clinic today, thank you for allowing me to participate in the care of this patient.      Ila Monterroso MD   02/10/25   14:37 EST

## 2025-02-14 ENCOUNTER — TELEPHONE (OUTPATIENT)
Dept: ONCOLOGY | Facility: CLINIC | Age: 80
End: 2025-02-14

## 2025-02-14 NOTE — TELEPHONE ENCOUNTER
Caller: Emily Steven    Relationship: Emergency Contact    Best call back number: [phone]    What is the best time to reach you: ANYTIME    Who are you requesting to speak with (clinical staff, provider,  specific staff member): CLINICAL       What was the call regarding:     WHEN LUIS WAS SEEN ON MONDAY 02/10 DR ENGEL HAD MENTIONED SHE WANTED HIM TO GET EGD AND COLONOSCOPY WITH DR CLAYTON ARNOLD OFFICE DID NOT GET THE ORDER FOR COLONOSCOPY ONLY THE EGD   WANTED TO CLARIFY ON THIS TO SEE IF DR ENGEL STILL WANTING MERLINE TO HAVE COLONOSCOPY TOO AND IF SO WILL NEED TO SEND THE ORDER

## 2025-02-27 DIAGNOSIS — C18.9 MALIGNANT NEOPLASM OF COLON, UNSPECIFIED PART OF COLON: Primary | ICD-10-CM

## 2025-02-27 NOTE — TELEPHONE ENCOUNTER
Called Dr Augustin office spoke with Patricia who instructed that we would have to place a GI referral to get EGD done along with Colonoscopy. Spoke with Jeanette FUNEZ who is placing referral to get EGD done a the same time. Once its placed will fax records to [phone] per Patricia to get tem scheduled together.

## 2025-04-09 ENCOUNTER — TELEPHONE (OUTPATIENT)
Dept: ONCOLOGY | Facility: CLINIC | Age: 80
End: 2025-04-09
Payer: MEDICARE

## 2025-04-09 NOTE — TELEPHONE ENCOUNTER
Caller: Memo Steven    Relationship to patient: Self    Best call back number: [phone]    Chief complaint: PT INQUIRING IF HE NEEDS TO STILL GET CT SCAN ON 4/30/25 & COME FOR F/U ON 5/7/25 AS HE SAW HIS GASTROLOGIST ON 4/4/25 WHO STATED PT WAS CANCER FREE, PLEASE ADVISE    Type of visit: CT SCAN & F/U 1    When is the original appointment: SCAN - 4/30/25 & F/U 5/7/25

## 2025-04-11 NOTE — TELEPHONE ENCOUNTER
RN informed patient that he does need to have his CT scan performed and follow up as scheduled. Patient voiced understanding.

## 2025-04-11 NOTE — TELEPHONE ENCOUNTER
PATIENT CALLING BACK TO CHECK THE STATUS OF SOMEONE LETTING HIM KNOW IF HE NEEDS THE CT SCAN AND THE FOLLOW UP

## 2025-04-24 ENCOUNTER — TELEPHONE (OUTPATIENT)
Dept: ONCOLOGY | Facility: CLINIC | Age: 80
End: 2025-04-24

## 2025-04-24 NOTE — TELEPHONE ENCOUNTER
Caller: Memo Steven    Relationship: Self    Best call back number: [phone]     What is the best time to reach you: ANYTIME    Who are you requesting to speak with (clinical staff, provider,  specific staff member): CLINICAL      What was the call regarding: PATIENT HAS CT 4/30, ALREADY HAS THE LIQUID, BUT ASKING WHEN HE SHOULD DRINK IT, PLEASE CALL PT TO GIVE INSTRUCTIONS

## 2025-04-30 ENCOUNTER — HOSPITAL ENCOUNTER (OUTPATIENT)
Dept: CT IMAGING | Facility: HOSPITAL | Age: 80
Discharge: HOME | End: 2025-04-30
Payer: MEDICARE

## 2025-04-30 DIAGNOSIS — R59.0 MEDIASTINAL ADENOPATHY: ICD-10-CM

## 2025-04-30 DIAGNOSIS — C18.9 MALIGNANT NEOPLASM OF COLON, UNSPECIFIED PART OF COLON: ICD-10-CM

## 2025-04-30 LAB — CREAT BLDA-MCNC: 1 MG/DL (ref 0.6–1.3)

## 2025-04-30 PROCEDURE — 82565 ASSAY OF CREATININE: CPT

## 2025-04-30 PROCEDURE — 74176 CT ABD & PELVIS W/O CONTRAST: CPT

## 2025-04-30 PROCEDURE — 71250 CT THORAX DX C-: CPT | Performed by: RADIOLOGY

## 2025-04-30 PROCEDURE — 74176 CT ABD & PELVIS W/O CONTRAST: CPT | Performed by: RADIOLOGY

## 2025-04-30 PROCEDURE — 71250 CT THORAX DX C-: CPT

## 2025-05-05 NOTE — PROGRESS NOTES
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    7/26/23: Ileocolonoscopy: Inverted mucosa at the appendix biopsy to evaluate for potential atypia, sessile serrated tissue demonstrated on previous biopsy fungating from the base of the cecum and extending in proximity to the ileocecal valve.  Cecal neoplasia with recommendation for ileocecal resection to remove for diagnostic and therapeutic benefit  8/18/23: CT A/P: No evidence of obstruction nor inflammation.  There is a 3.8 x 2.3 cm masslike area within the posterior inferior sacrum near the appendiceal orifice presumably representing patient's cecal mass, otherwise no metastatic            disease seen.  8/28/23: Right ileocolic resection: Invasive moderately differentiated adenocarcinoma extending to the serosal surface, pT4a, 0/16 lymph nodes negative, surgical margins negative. Tumor size: 2.2 cm, G2 moderately differentiated, invades visceral peritoneum, tumor perforation not identified, lymph-vascular invasion not identified, perineural invasion not identified. Distance from closest proximal mucosal margin is 4 cm. Large sessile serrated adenoma, negative for dysplasia or carcinoma, multiple benign lymph nodes, surgical margins are free of dysplasia.  5/1/24: CT A/P due to worsening abdominal pain showed markedly abnormal appearance of the cecum. Tiny amount of extraluminal air in this region. There are postoperative changes in this region. He was evaluated by Dr. Paul with surgery who recommended outpatient management and was prescribed Augmentin for diverticulitis with  "close follow up.   6/28/24: Ileocolonoscopy: No evidence of neoplasia in the ileocolic region noting CT with \"abnormal appearance of the cecum\" and previous resection of T4 N0 cancer.  Dense diverticular changes in the left and sigmoid colon.  Internal hemorrhoids.  7/5/24: Was evaluated in the ED for abdominal pain. CT A/P showed a moderate abnormal appearance of the cecum is again noted with moderate surrounding inflammatory changes, fluid, and mild mural colon wall thickening suggestive of acute severe diverticulitis/focal colitis. Increased gaseous distention seen throughout the large and small bowel suggesting a component of ileus, there is hyper enhancing soft tissue nodule adjacent to this region of inflammatory change measuring 8 mm which may reflect a reactive mesenteric lymph node. He was treated with Augmentin for diverticulitis.   9/3/24: CT A/P: Interval increase in size of small enhancing nodule medial to the ileocolic anastomosis.  This now measures 11 x 11 mm and measured less than 5 mm on the study from 5 months prior.  This is consistent with doubling over 5 months and concerning for malignancy.  10/16/24: PET/CT: No hypermetabolic masses or nodule seen in the area of the iliac colic anastomosis no hypermetabolic abdominal or pelvic adenopathy and right hemicolectomy changes seen. Relatively increased hypermetabolic activity involving the right posterior hepatic lobe central aspect greater than the remainder of the background.  Hypermetabolic mediastinal and hilar adenopathy following in the range of malignancy.    He was referred to pulmonary due to findings on PET/CT and bronchoscopy was performed by Dr. Tate on 11/20/24 which was negative for malignancy. 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 Mills who felt that this might be colonization and repeat CT chest was negative as well as workup. CEA is normal.   12/11/24: CT A/P: Question anterior gastric " wall thickening versus mixing of contrast and gastric material. This may be further evaluated with upper endoscopy.     Mr. Steven has had worsening constipation since his colon surgery but denies of any blood in the stool. He denies of any weight changes, shortness of breath, abdominal pain.      Interim History:  Mr. Steven denies of any changes since his last visit.  He presents today to follow-up on imaging results.  He denies of any recent infections or shortness of breath and reports that he is feeling well without any significant complaints today.  He recently underwent a colonoscopy with Dr. Etienne and as per patient there were no abnormalities.            The following portions of the patient's history were reviewed and updated as appropriate: allergies, current medications, past family history, past medical history, past social history, past surgical history and problem list.    Allergies   Allergen Reactions    Aspirin GI Intolerance    Nsaids GI Intolerance       Past Medical History:   Diagnosis Date    Anxiety disorder     Arthritis     Colon polyps     Depression     Diverticulitis     Fibromyalgia     High blood pressure     High cholesterol     High triglycerides     Panic attacks     Thyroid disorder    Fibromylagia       Past Surgical History:   Procedure Laterality Date    BRONCHOSCOPY N/A 11/20/2024    Procedure: BRONCHOSCOPY WITH ENDOBRONCHIAL ULTRASOUND;  Surgeon: Heriberto Tate MD;  Location:  [facility] OR;  Service: Pulmonary;  Laterality: N/A;    CHOLECYSTECTOMY  2017    COLON RESECTION N/A 8/29/2023    Procedure: ILEOCOLIC RESECTION  LAPAROSCOPIC;  Surgeon: Guido Etienne MD;  Location: [facility] beds at the [facility] OR;  Service: General;  Laterality: N/A;    COLONOSCOPY      LARYNX SURGERY      NECK SURGERY      disc fusion         Social History     Socioeconomic History    Marital status:    Tobacco Use    Smoking status: Never     Passive exposure: Never    Smokeless tobacco: Never   Vaping Use     Vaping status: Never Used   Substance and Sexual Activity    Alcohol use: No    Drug use: No    Sexual activity: Not Currently     Partners: Female     Birth control/protection: Post-menopausal         Family History   Problem Relation Age of Onset    Breast cancer Sister     Prostate cancer Brother     Heart disease Brother     Diabetes Mother     Hypertension Mother            Current Outpatient Medications:     ALPRAZolam (XANAX) 1 MG tablet, Take 1 tablet by mouth 3 (Three) Times a Day As Needed., Disp: , Rfl: 5    amLODIPine (NORVASC) 2.5 MG tablet, Take 1 tablet by mouth Daily., Disp: , Rfl: 1    cetirizine (zyrTEC) 10 MG tablet, Take 1 tablet by mouth Daily As Needed., Disp: , Rfl: 3    Cholecalciferol (Vitamin D3) 50 MCG (2000 UT) capsule, Take 1 capsule by mouth Daily., Disp: , Rfl:     cycloSPORINE (RESTASIS) 0.05 % ophthalmic emulsion, 1 drop 2 (Two) Times a Day., Disp: , Rfl:     escitalopram (LEXAPRO) 20 MG tablet, Take 2 tablets by mouth Every Morning., Disp: , Rfl: 5    fluticasone (VERAMYST) 27.5 MCG/SPRAY nasal spray, Administer 2 sprays into the nostril(s) as directed by provider Daily., Disp: , Rfl:     levothyroxine sodium (TIROSINT) 50 MCG capsule, Take 1 capsule by mouth Every Morning., Disp: , Rfl: 1    Linzess 72 MCG capsule capsule, Take 1 capsule by mouth Every Morning Before Breakfast., Disp: , Rfl:     OLANZapine zydis (zyPREXA) 20 MG disintegrating tablet, Place 1 tablet on the tongue Every Night., Disp: , Rfl:     polyethylene glycol (MIRALAX) powder, Take 17 g by mouth Daily As Needed., Disp: , Rfl: 5    pravastatin (PRAVACHOL) 20 MG tablet, Take 1 tablet by mouth Every Night., Disp: , Rfl: 1    pregabalin (LYRICA) 100 MG capsule, Take 1 capsule by mouth 2 (Two) Times a Day., Disp: , Rfl:     traMADol (ULTRAM) 50 MG tablet, Take 1 tablet by mouth Daily As Needed for Moderate Pain., Disp: , Rfl:     ZETIA 10 MG tablet, Take 1 tablet by mouth Every Night., Disp: , Rfl: 1    zolpidem  (AMBIEN) 10 MG tablet, 1 tablet At Night As Needed., Disp: , Rfl: 5          Review of Systems  A comprehensive 14-point review of systems performed.  Significant findings as mentioned above.  All other systems reviewed and are negative.          Physical Exam  Vital Signs: These were reviewed and listed as per patient’s electronic medical chart  Vitals:    05/07/25 1404   BP: 113/82   Pulse: 74   Resp: 18   Temp: 97.3 °F (36.3 °C)   SpO2: 98%     General: Awake, alert and oriented, in no distress  HEENT: Head is atraumatic, normocephalic, extraocular movements full, oropharynx clear, no scleral icterus, pink moist mucous membranes  Neck: Supple, no JVD, lymphadenopathy or masses  Cardiovascular: Regular rate and rhythm without murmurs, rubs or gallops  Pulmonary: Nonlabored, clear to auscultation bilaterally, no wheezing  Abdomen: Soft, nontender, nondistended, normal active bowel sounds present, no organomegaly  Extremities: No clubbing, cyanosis or edema  Lymph: No cervical, supraclavicular adenopathy  Neurologic: Mental status as above, alert, awake and oriented, grossly nonfocal exam  Skin: Warm, dry, intact                Pain Score:  Pain Score    05/07/25 1404   PainSc: 0-No pain                 PHQ-Score Total:  PHQ-9 Total Score:          PROCEDURES:  7/14/17 6/23/23 7/26/23 6/28/24            IMAGING:  CT Abdomen Pelvis With Contrast (08/18/2023 15:56)   Findings:LUNG BASES:  Unremarkable without mass or infiltrate.LIVER:  Unremarkable parenchyma without focal lesion. BILIARY/GALLBLADDER: Cholecystectomy  SPLEEN:  Unremarkable. PANCREAS:  Unremarkable. ADRENAL:  Unremarkable.KIDNEYS:  Unremarkable parenchyma with no solid mass identified. No obstruction.  No calculus identified.GASTROINTESTINAL/MESENTERY:  No evidence of obstruction nor inflammation.  There is a 3.8 x 2.3 cm masslike area within the posterior inferior sacrum near the appendiceal orifice presumably representing patient's  cecal mass (image 86, series 2).MESENTERIC VESSELS:  Patent.AORTA/IVC:  Normal caliber.RETROPERITONEUM/LYMPH NODES:  Unremarkable. REPRODUCTIVE:  Unremarkable. BLADDER:  Unremarkable. OSSEUS STRUCTURES:  Typical for age with no acute process identified.  IMPRESSION: 1.No evidence of metastatic disease within the abdomen or pelvis.2.Vague cecal masslike area presumably representing patient's primary diagnosis.     CT Abdomen Pelvis With Contrast (05/01/2024 13:59)   FINDINGS:ABDOMEN: There is mild right basilar atelectasis. The gallbladder is absent. There is no hydronephrosis or nephrolithiasis. The solid organs are otherwise unremarkable. There is no free fluid or adenopathy.PELVIS: There are postoperative changes in the region of the cecum. There is marked surrounding fluid, inflammation, stranding and possible soft tissue. There is a tiny amount of extraluminal air in this region. The urinary bladder is unremarkable. There is no adenopathy.   IMPRESSION: Markedly abnormal appearance of the cecum. There are postoperative changes in this region. Surrounding fluid and stranding is present. A tiny amount of extraluminal air is noted. This may be postoperative in nature. Alternatively, this may be inflammatory, infectious or neoplastic in nature.     CT Abdomen Pelvis With Contrast (07/05/2024 20:51)   FINDINGS: LOWER CHEST: The heart is normal in size. Basal atelectasis, right greater than left..ABDOMEN/PELVIS: Liver, gallbladder and bile ducts: The liver enhances homogenously without suspicious focal hepatic lesion. Prior cholecystectomy. No significant biliary ductal dilatation.Adrenal glands: The adrenal glands are morphologically unremarkable without suspicious lesion.Kidneys, ureters and urinary bladder: No suspicious renal lesions. No hydronephrosis. Unremarkable urinary bladder. Spleen: The spleen is normal in size. Pancreas: The pancreas is unremarkable. Gastrointestinal system and mesentery: There is no  evidence of bowel obstruction. Postoperative changes of the sacrum are again noted. Appendix is surgically absent. Moderate abnormal appearance of the cecum is again noted with moderate surrounding inflammatory changes, moderate surrounding fluid and mild mural thickening of the colonic wall within this region. Findings are suggestive of acute severe diverticulitis/focal colitis. No definite evidence to suggest perforation. There is increased gaseous distention seen throughout the large and small bowel suggesting a component of ileus. There is a hyperenhancing soft tissue nodule adjacent to this region of inflammatory change measuring 8 mm which may reflect a reactive mesenteric lymph node (series 2, image 53). Neoplastic involvement is not excluded. No significant mesenteric inflammation. Lymph nodes: No pathologically enlarged abdominal or pelvic lymph nodes are present. Vessels: The abdominal aorta is normal in caliber. The celiac trunk, superior mesenteric artery, inferior mesenteric artery and their branch vessels appear grossly patent. The superior mesenteric vein, splenic vein and main portal veins are patent. The inferior vena cava and hepatic veins are unremarkable. Peritoneum: No free intraperitoneal fluid or pneumoperitoneum. Pelvic viscera: No acute findings. Body wall: No acute findings. No significant body wall hernias. Bones: No acute fracture.   IMPRESSION: Similar appearance to the cecum and adjacent large bowel loops on today's study with appearance suggestive of acute focal diverticulitis/colitis. Significant surrounding fluid and inflammatory change. No definite evidence of gross perforation. Adjacent 8 mm soft tissue density may reflect a reactive lymph node, however, neoplastic metastatic theo involvement is not excluded. Increased gaseous distention of the bowel on today's study appears most consistent with a component of ileus.     CT Abdomen Pelvis With Contrast (09/03/2024 16:04)    FINDINGS: There are dependent densities in the lung bases consistent with atelectasis. The gallbladder is surgically absent. There is no biliary ductal dilation. The spleen, adrenal glands, and pancreas are unremarkable. The kidneys demonstrate no stones or hydronephrosis. There is a lower pole right renal 1 cm hypodensity most consistent with a cyst. This is unchanged from the prior study. There are trace vascular calcifications of the aorta. There is no aneurysm. The urinary bladder is unremarkable. The GI tract demonstrates no obstruction. There is increased gas within the stomach. The small bowel is unremarkable. There is no bowel obstruction. The patient is status post ileocecectomy with anastomosis. Significant inflammatory changes seen on the prior study have resolved. A previously described small enhancing nodule medial to the anastomosis now measures 11 x 11 mm and previously measured 8 x 8.5 mm. This measures less than 5 mm on a study from 5 months prior. This has essentially doubled in 5 months which is concerning for malignancy. A reactive/inflammatory node is still considered in the differential. There is no other mesenteric or retroperitoneal adenopathy identified. There is moderate pandiverticulosis predominantly involving the ascending, transverse, and descending colon. There is sparing of the sigmoid colon. There is no free air, free fluid or inflammatory process.   IMPRESSION: 1. Resolved inflammatory changes in the right lower quadrant. 2. No obstruction. 3. Moderate pandiverticulosis without diverticulitis. 4. Interval increase in size of a small enhancing nodule medial to the ileocolic anastomosis. This now measures 11 x 11 mm and measured less than 5 mm on the study from 5 months prior. This is consistent with doubling over 5 months. This is concerning for malignancy. 5. There is no free air, free fluid or inflammatory process.     NM PET/CT Skull Base to Mid Thigh (10/16/2024 11:15)    FINDINGS:HEAD:Brain and extra-axial spaces:  Unremarkable as visualized.Nasopharynx:  Unremarkable as visualized.NECK:Hypopharynx:  Unremarkable as visualized.Larynx:  Unremarkable as visualized.Trachea:  Unremarkable as visualized.Retropharyngeal space:  Unremarkable as visualized.Submandibular/parotid glands:  Unremarkable as visualized.  Glands are normal in size.  Thyroid:  Unremarkable as visualized.  No enlarged or calcified nodules. CHEST:Lungs and pleural spaces:  Unremarkable as visualized.  No mass.  No consolidation.  No significant effusion.  No pneumothorax.  Heart:  Unremarkable as visualized.  No cardiomegaly.  No significant pericardial effusion.  Mediastinum:  Unremarkable as visualized.  No mass. ABDOMEN:  Liver:  Focus of relatively increased FDG hypermetabolic activity involving the right posterior hepatic lobe central aspect greater than the remainder of background hepatic FDG hypermetabolic activity with maximum SUV: 4.0. Consider further evaluation with dedicated liver imaging either CT, MRI, or ultrasound.Gallbladder and bile ducts:  Unremarkable as visualized.  No calcified stones.  No ductal dilation.  Pancreas:  Unremarkable as visualized.  No ductal dilation.  Spleen:  Unremarkable as visualized.  No splenomegaly.  Adrenals:  Unremarkable as visualized.  No mass.  Kidneys and ureters:  Unremarkable as visualized.  Stomach and bowel:  Right hemicolectomy changes are noted.  Colonic diverticulosis without diverticulitis.  No obstruction.  No FDG hypermetabolic masses or nodules are seen in the area of ileocolic anastomosis.PELVIS: Appendix:  See above. Bladder:  Unremarkable as visualized. Reproductive:  Prostate enlargement.WHOLE BODY: Intraperitoneal space:  Unremarkable as visualized.  No significant fluid collection.  No free air.Bones/joints:  Unremarkable as visualized.  No acute fracture.  No dislocation.  Soft tissues:  Unremarkable as visualized.  Vasculature:  Unremarkable as  visualized.  No aortic aneurysm.  Lymph nodes:  1.5 cm left infrahilar lymph node or central pulmonary nodule with marked FDG hypermetabolism and maximum SUV: 7.1.  Left suprahilar node with FDG hypermetabolism and maximum SUV: 6.2.  A right middle hilar node with FDG hypermetabolism with maximum SUV: 4.4.  No FDG hypermetabolic abdominal or pelvic adenopathy.  IMPRESSION:1.  No FDG hypermetabolic masses or nodules are seen in the area of ileocolic anastomosis.2.  No FDG hypermetabolic abdominal or pelvic adenopathy.3.  Right hemicolectomy changes are noted.4.  Focus of relatively increased FDG hypermetabolic activity involving the right posterior hepatic lobe central aspect greater than the remainder of background hepatic FDG hypermetabolic activity with maximum SUV: 4.0. Consider further evaluation with dedicated liver imaging either CT, MRI, or ultrasound. 5. FDG hypermetabolic mediastinal and hilar adenopathy with maximum SUV values that fall within the range of malignancy. 6. 1.5 cm left infrahilar lymph node or central pulmonary nodule with marked FDG hypermetabolism and maximum SUV: 7.1.7.  Left suprahilar node with FDG hypermetabolism and maximum SUV: 6.2.8.  A right middle hilar node with FDG hypermetabolism with maximum SUV:4.4.    MRI Abdomen With & Without Contrast (11/02/2024 16:16)   FINDINGS:Lung bases:  Unremarkable as visualized.  No mass.  No consolidation.Liver:  Tiny T2 hyperintense lesion right posterior hepatic lobe that is 0.45 cm and is too small to characterize enhancement characteristics but demonstrates restricted diffusion with corresponding low signal on ADC map which increase suspicion for possible metastasis. The liver is otherwise normal throughout. Refer to axial DWI image #70; axial postcontrast sequence image #33; axial T2 sequence image #13.  Gallbladder and bile ducts:  Unremarkable as visualized.  No calcified stones.  No ductal dilation.  Pancreas:  Unremarkable as visualized.   No ductal dilation.  No mass.  Spleen:  Unremarkable as visualized.  No splenomegaly.  Adrenals:  Unremarkable as visualized.  No mass.  Kidneys and ureters:  Simple cyst right and left kidneys.  No hydronephrosis.  Stomach and bowel:  Right hemicolectomy changes are noted.  Colonic diverticulosis without diverticulitis.  No obstruction.  Intraperitoneal space:  Unremarkable as visualized.  No significant fluid collection.  Soft tissues:  Unremarkable as visualized.  Vasculature:  Unremarkable as visualized.  No abdominal aortic aneurysm.  Lymph nodes:  Unremarkable as visualized.  No enlarged lymph nodes.  IMPRESSION:1. Tiny T2 hyperintense lesion right posterior hepatic lobe that is 0.45 cm and is too small to characterize enhancement characteristics but demonstrates restricted diffusion with corresponding low signal on ADC map which increase suspicion for possible metastasis. The liver is otherwise normal throughout. Refer to axial DWI image #70; axial postcontrast sequence image #33; axial T2 sequence image #13. This localizes to the same hepatic segment noted with focus of hypermetabolism on previous PET/CT. 2. No evidence of adenopathy. Otherwise unremarkable exam.    MRI Pelvis With & Without Contrast (11/02/2024 16:17)   FINDINGS:Bowel:  Pelvic portions of the GI tract appear within normal limits except for sigmoid diverticulosis. No diverticulitis.  No obstruction.  No mucosal thickening.Intraperitoneal space:  Unremarkable as visualized.  No free air.  No free fluid.  Bladder:  Unremarkable as visualized.  No stones.  No mass.  Prostate:  Prostate enlargement with central hyperplasia constituting changes of BPH.  Seminal vesicles:  Unremarkable as visualized.  No nodule or cyst.  Bones/joints:  Unremarkable as visualized.  No dislocation.  No acute bony findings.  Soft tissues:  Right trochanteric bursitis and adjacent edema of the gluteus mary carmen muscle possibly low-grade strain.Vasculature:  Unremarkable  as visualized.  No lower abdominal aortic aneurysm.  Lymph nodes:  Unremarkable as visualized.  No iliac adenopathy identified.  No lower retroperitoneal adenopathy identified.  IMPRESSION:1.  No pelvic adenopathy identified based on MRI size criteria.2.  Prostate enlargement with central hyperplasia constituting changes of BPH.3.  Right trochanteric bursitis and adjacent edema of the gluteus mary carmen muscle possibly low-grade strain.4. Other incidental/nonacute findings above.    CT Abdomen Pelvis With Contrast (12/11/2024 15:19)   FINDINGS:LUNG BASES:  Unremarkable as visualized.  No mass.  No consolidation.ABDOMEN:LIVER:  Unremarkable as visualized.  No mass.GALLBLADDER AND BILE DUCTS:  Cholecystectomy clips.  No ductal dilation.  PANCREAS:  Unremarkable as visualized.  No mass.  No ductal dilation.  SPLEEN:  Unremarkable as visualized.  No splenomegaly.  ADRENALS:  Unremarkable as visualized.  No mass.  KIDNEYS AND URETERS:  Unremarkable as visualized.  No solid mass.  No hydronephrosis.  STOMACH AND BOWEL:  Question anterior gastric wall thickening versus mixing of contrast and gastric material.  Moderate colonic stool. Scattered diverticula in the colon.  No diverticulitis.  No obstruction.PELVIS: APPENDIX:  No findings to suggest acute appendicitis. BLADDER:  Unremarkable as visualized.  No mass.  REPRODUCTIVE:  Unremarkable as visualized. ABDOMEN and PELVIS:  INTRAPERITONEAL SPACE:  Unremarkable as visualized.  No free air.  No significant fluid collection.  BONES/JOINTS:  No acute fracture.  No dislocation.  SOFT TISSUES:  Unremarkable as visualized.  VASCULATURE:  Unremarkable as visualized.  No abdominal aortic aneurysm.  LYMPH NODES:  Unremarkable as visualized.  No enlarged lymph nodes.  IMPRESSION:  Question anterior gastric wall thickening versus mixing of contrast and gastric material. This may be further evaluated with upper endoscopy.    CT Chest Without Contrast Diagnostic (12/23/2024 12:16)    FINDINGS:LIMITATIONS:  Please note that reported measurements are made manually, as computer generated (AI) measurements can over measure lesions. Additionally, lesions identified by AI may have been present presently, significant nodules will be discussed in the report and the visual depictions of lesions provided by AI are for general reference only.  LUNGS AND PLEURAL SPACES:  Unremarkable as visualized.  No mass.  No consolidation.  No pneumothorax.  No significant effusion.HEART:  Unremarkable as visualized.  No cardiomegaly.  No significant pericardial effusion.  No significant coronary artery calcifications.  BONES/JOINTS:  Unremarkable as visualized.  No acute fracture.  No dislocation.  SOFT TISSUES:  Unremarkable as visualized.  VASCULATURE:  Atherosclerotic disease.  No thoracic aortic aneurysm.LYMPH NODES:  Unremarkable as visualized.  No enlarged lymph nodes.  IMPRESSION:No evidence of fungal disease.    CT Abdomen Pelvis Without Contrast (04/30/2025 14:12)   FINDINGS:Lung bases:  Refer to chest CT for findings of the lungs.  No consolidation. ABDOMEN:  Liver:  Unremarkable.  Gallbladder and bile ducts:  Cholecystectomy.  No ductal dilation.  Pancreas:  Unremarkable.  No ductal dilation.  Spleen:  Unremarkable.  No splenomegaly.  Adrenals:  Unremarkable.  No adrenal masses.  Kidneys and ureters:  Unremarkable.  No obstructing stones.  No hydronephrosis.  Stomach and bowel:  Postsurgical changes again noted from ileocolic resection.  Mild colonic diverticulosis without diverticulitis.  No obstruction. PELVIS:  Appendix:  No findings to suggest acute appendicitis.  Bladder:  Unremarkable.  No stones.  Reproductive:  Prostate enlargement. ABDOMEN and PELVIS:  Intraperitoneal space:  Unremarkable.  No significant fluid  collection.  No pneumoperitoneum identified.  Bones/joints:  Degenerative changes lumbar spine stable from previous.Multilevel stenosis noted.  No acute fracture.  No dislocation.  Soft  tissues:  Unremarkable.  Vasculature:  Unremarkable.  No abdominal aortic aneurysm.  Lymph nodes:  Unremarkable.  No retroperitoneal or iliac adenopathy identified.  IMPRESSION:1.  No evidence of metastatic or recurrent disease throughout the abdomen or pelvis.2.  Other incidental/nonacute findings above stable from previous.     CT Chest Without Contrast Diagnostic (04/30/2025 14:12)   FINDINGS:  Lungs and pleural spaces:  Faint centrilobular nodularity noted throughout the lingula most consistent with changes of mild or possibly resolving phase atypical pneumonia.  Interval development of 7.2 mm subpleural nodule inferior segment lingula, image #70.  No significant effusion.  Heart:  Unremarkable.  No cardiomegaly.  No significant pericardial effusion.  No significant coronary artery calcifications.  Mediastinum:  Unremarkable.  No hilar or mediastinal lymphadenopathy.  Bones/joints:  Degenerative changes thoracic spine are stable.  No acute fracture.  No dislocation.  Soft tissues:  Unremarkable.  Vasculature:  Stable mild atherosclerosis thoracic aorta.  No thoracic aortic aneurysm.  Lymph nodes:  See above.  IMPRESSION:1.  No hilar or mediastinal lymphadenopathy.2.  Faint centrilobular nodularity noted throughout the lingula most consistent with changes of mild or possibly resolving phase atypical pneumonia.3.  Interval development of 7.2 mm subpleural nodule inferior segment lingula, image #70. This may be inflammatory in nature given centrilobular nodules in this area of the left upper lobe.4. Short-term follow-up CT is recommended to assess for resolution.5. Other incidental/nonacute findings above.        LABS/STUDIES:   Office Visit on 05/07/2025   Component Date Value    Glucose 05/07/2025 104 (H)     BUN 05/07/2025 16     Creatinine 05/07/2025 0.95     Sodium 05/07/2025 136     Potassium 05/07/2025 4.2     Chloride 05/07/2025 99     CO2 05/07/2025 27.7     Calcium 05/07/2025 9.0     Total Protein  05/07/2025 7.5     Albumin 05/07/2025 4.2     ALT (SGPT) 05/07/2025 15     AST (SGOT) 05/07/2025 21     Alkaline Phosphatase 05/07/2025 72     Total Bilirubin 05/07/2025 0.3     Globulin 05/07/2025 3.3     A/G Ratio 05/07/2025 1.3     BUN/Creatinine Ratio 05/07/2025 16.8     Anion Gap 05/07/2025 9.3     eGFR 05/07/2025 81.9     CEA 05/07/2025 1.53     WBC 05/07/2025 7.95     RBC 05/07/2025 4.38     Hemoglobin 05/07/2025 14.1     Hematocrit 05/07/2025 42.0     MCV 05/07/2025 95.9     MCH 05/07/2025 32.2     MCHC 05/07/2025 33.6     RDW 05/07/2025 13.0     RDW-SD 05/07/2025 45.9     MPV 05/07/2025 11.1     Platelets 05/07/2025 198     Neutrophil % 05/07/2025 60.4     Lymphocyte % 05/07/2025 28.3     Monocyte % 05/07/2025 9.2     Eosinophil % 05/07/2025 0.8     Basophil % 05/07/2025 0.9     Immature Grans % 05/07/2025 0.4     Neutrophils, Absolute 05/07/2025 4.81     Lymphocytes, Absolute 05/07/2025 2.25     Monocytes, Absolute 05/07/2025 0.73     Eosinophils, Absolute 05/07/2025 0.06     Basophils, Absolute 05/07/2025 0.07     Immature Grans, Absolute 05/07/2025 0.03     nRBC 05/07/2025 0.0    Hospital Outpatient Visit on 04/30/2025   Component Date Value    Creatinine 04/30/2025 1.00    Hospital Outpatient Visit on 02/09/2025   Component Date Value    Creatinine 02/09/2025 1.00    Office Visit on 12/23/2024   Component Date Value    Result 12/23/2024 Negative     Fungitell Value 12/23/2024 <31.25     Reference Value 12/23/2024 Comment     Interpretation 12/23/2024 Notes     Clinical Relevance 12/23/2024 Notes     Disclaimer 12/23/2024 Notes     Electronically signed by: 12/23/2024 Comment     Aspergillus fumigatus 12/23/2024 Negative     Aspergillus flavus 12/23/2024 Negative     Aspergillus niger 12/23/2024 Negative     Blastomyces Abs, Qn, DID 12/23/2024 Negative     C-Reactive Protein 12/23/2024 0.34     Blastomyces Abs, Qn, DID 12/23/2024 Negative    Hospital Outpatient Visit on 12/11/2024   Component Date Value     Creatinine 2024 1.10    Admission on 2024, Discharged on 2024   Component Date Value    Fungus Stain 2024 Final report     KOH/Calcofluor preparati* 2024 Comment     Culture 2024 Final report (A)     Fungus (Mycology) Result* 2024 Comment (A)     AFB Specimen Processing 2024 Concentration     Acid Fast Smear 2024 Negative     Culture 2024 Negative     BAL Culture 2024 No growth     Gram Stain 2024 WBCs seen     Gram Stain 2024 Gram positive cocci     Reference Lab Report 2024                      Value:[facility]  [address] [place] [address]  Phone: [phone] or [phone]  Fax: [phone]  Rajesh Rodriguez M.D., Medical Director    PATIENT NAME                                    LABORATORY NO.  [id number]   MERLINE GUO                                  [id number]  [id number]                                  AGE                  SEX      SSN            CLIENT REF #  [facility] CRISTINA                       79        1945            [id number]    [id number]  [address]                              REQUESTING M.D.         ATTENDING M.D..         [place] [address]                            EFRAIN THAPA  DATE COLLECTED          DATE RECEIVED           DATE REPORTED  2024    DIAGNOSIS:  A.       BRONCHIAL LAVAGE, RIGHT MIDDLE LOBE:  Negative for malignant cells.  B.       BRONCHIAL LAVAGE, LINGULA:  Negative for malignant cells.  C.       BRONCH WASH:  Negative                           for malignant cells.    MICROSCOPIC DESCRIPTION:  A.     Many histiocytes and few bronchial cells are present.  B.     Bronchial cells and pulmonary macrophages are present.  C.     Pulmonary macrophages, Inflammatory cells and squamous cells are present.    Professional interpretation rendered by Staci Aguirre D.O.,  [facility] at [facility] [employer], [address] [place] [address].    CLINICAL HISTORY:  Mediastinal lymphadenopathy      SPECIMENS SUBMITTED:  A.    BRONCHIAL LAVAGE , RIGHT MIDDLE LOBE  B.    BRONCHIAL LAVAGE , LINGULA  C.    BRONCH WASH    GROSS SPECIMEN DESCRIPTION:  A.     35 ccs of clear, colorless fluid, scant mucoid sediment, received in fixative  Cell block has been examined.  B.     40 ccs of clear, colorless fluid, scant sediment, received in fixative  Automated paraffin embedded cell block has been examined.  C.     45 ccs of hazy, colorless fluid, with mucoid sediment, received in fixative  Cell block has been examined.    CYTOTECHNOLOGIST:         SAILAJA                           CT (ASCP)                       REVIEWED, DIAGNOSED AND ELECTRONICALLY  SIGNED BY:    Staci Aguirre D.O., VERONICA.  CPT CODES:  88112x3, 88305x3      Fungus Stain 11/20/2024 Final report     KOH/Calcofluor preparati* 11/20/2024 Comment     Culture 11/20/2024 Final report (A)     Fungus (Mycology) Result* 11/20/2024 Comment (A)     AFB Specimen Processing 11/20/2024 Concentration     Acid Fast Smear 11/20/2024 Negative     Culture 11/20/2024 Negative     BAL Culture 11/20/2024 No growth     Gram Stain 11/20/2024 WBCs seen     Gram Stain 11/20/2024 No organisms seen     Fungus Stain 11/20/2024 Final report     KOH/Calcofluor preparati* 11/20/2024 Comment     Culture 11/20/2024 Final report (A)     Fungus (Mycology) Result* 11/20/2024 Candida albicans (A)     Fungus (Mycology) Result* 11/20/2024 Comment (A)     AFB Specimen Processing 11/20/2024 Concentration     Acid Fast Smear 11/20/2024 Negative     Culture 11/20/2024 Negative     Respiratory Culture 11/20/2024 Light growth (2+) Normal respiratory amanuel. No S. aureus or Pseudomonas aeruginosa detected. Final report.     Gram Stain 11/20/2024 Many (4+) WBCs seen     Gram Stain 11/20/2024 Few (2+) Gram positive cocci in pairs and clusters     Gram Stain 11/20/2024 Rare (1+)  Gram positive bacilli     Gram Stain 11/20/2024 Rare (1+) Gram negative bacilli    Lab on 11/19/2024   Component Date Value    Glucose 11/19/2024 92     BUN 11/19/2024 19     Creatinine 11/19/2024 0.87     Sodium 11/19/2024 138     Potassium 11/19/2024 4.5     Chloride 11/19/2024 103     CO2 11/19/2024 23.8     Calcium 11/19/2024 9.3     Total Protein 11/19/2024 7.2     Albumin 11/19/2024 4.2     ALT (SGPT) 11/19/2024 13     AST (SGOT) 11/19/2024 18     Alkaline Phosphatase 11/19/2024 60     Total Bilirubin 11/19/2024 0.4     Globulin 11/19/2024 3.0     A/G Ratio 11/19/2024 1.4     BUN/Creatinine Ratio 11/19/2024 21.8     Anion Gap 11/19/2024 11.2     eGFR 11/19/2024 88.3     WBC 11/19/2024 7.83     RBC 11/19/2024 4.24     Hemoglobin 11/19/2024 13.8     Hematocrit 11/19/2024 40.8     MCV 11/19/2024 96.2     MCH 11/19/2024 32.5     MCHC 11/19/2024 33.8     RDW 11/19/2024 13.2     RDW-SD 11/19/2024 46.6     MPV 11/19/2024 11.8     Platelets 11/19/2024 194     Neutrophil % 11/19/2024 69.3     Lymphocyte % 11/19/2024 19.0 (L)     Monocyte % 11/19/2024 10.2     Eosinophil % 11/19/2024 0.4     Basophil % 11/19/2024 0.6     Immature Grans % 11/19/2024 0.5     Neutrophils, Absolute 11/19/2024 5.42     Lymphocytes, Absolute 11/19/2024 1.49     Monocytes, Absolute 11/19/2024 0.80     Eosinophils, Absolute 11/19/2024 0.03     Basophils, Absolute 11/19/2024 0.05     Immature Grans, Absolute 11/19/2024 0.04     nRBC 11/19/2024 0.0     Protime 11/19/2024 13.5     INR 11/19/2024 1.02     PTT 11/19/2024 26.5          PATHOLOGY:   8/29/23 11/20/24          Assessment & Plan   Memo Steven is a very pleasant 79 y.o.  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    Colon adenocarcinoma  Mediastinal/hilar adenopathy  - Patient was diagnosed with a T4a, N0 colon adenocarcinoma in August 2023 after he had a right ileocolic resection  performed by Dr. Etienne. At the end of April 2024 he began experiencing recurrent abdominal pain that was felt to be related to diverticulitis and would resolve with antibiotic treatment. He also had a repeat colonoscopy in June 2024 with Dr. Etienne that did not reveal any evidence of malignancy. However repeat CT imaging in September 2024 showed an interval increase in size in nodules that are medial to the ileocolic anastomosis, recently measuring 11 x 11 mm and doubling over 5 months which was felt to be concerning for malignancy. PET/CT obtained October 2024 did not reveal any hypermetabolic masses or nodules seen in the area of the iliac colic anastomosis and no hypermetabolic abdominal or pelvic adenopathy changes were seen. However there was increased hypermetabolic mediastinal and hilar adenopathy following in the range of malignancy.    - Given CT findings obtained MRI imaging to further evaluate nodules seen near anastomosis site. MRI significant for a tiny T2 hyperintense lesion right posterior hepatic lobe that is 0.45 cm and is too small to characterize suspicion for possible metastasis. The liver is otherwise normal throughout. I discussed imaging with radiology who felt that it was too small to biopsy and they also did not see any enhancing nodules medial to the ileocolic anastomosis however recommended repeating CT imaging with oral and IV contrast in one month.   - Patient was also referred to pulmonary for possible bronchoscopy due to PET/CT findings and this was performed by Dr. Tate on 11/20/24 which was negative for malignancy. 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 Mills who felt that this might be colonization and repeat CT chest was negative. CEA is normal.   - He had a CT A/P in December 2024 which I  discussed imaging results with them over the phone as imaging had showed gastric thickening. I offered referral for endoscopic evaluation. However, he  wanted to wait until the results of the MRI of the abdomen pelvis prior to being reevaluated by his surgeon. I also discussed his imaging with radiology who noted that the previous nodules seen medial to the iliac colic anastomosis is stable from his previous imaging and could also possibly represent diverticulum. No liver lesions were seen on most recent imaging.    - MRI A/P was obtained on 2/9/25 which showed a single focus of T2 hyperintensity posterior right lobe of the liver unchanged from the previous exam and there is no evidence of interval development of new metastatic disease to the abdomen and there is mild thickening of the sigmoid colon wall that is nonspecific otherwise no adenopathy, soft tissue masses, or free fluid identified.  - Given gastric and sigmoid thickening seen on imaging I recommended that he be re-evaluated by Dr. Etienne for endoscopic evaluation and he recently had a colonoscopy in which patient reports was negative. Will obtain records.  - Surveillance imaging from 4/30/25 shows centrilobular nodularity noted throughout the lingula most consistent with changes of mild or possibly resolving phase atypical pneumonia and there is an interval development of 7.2 mm subpleural nodule in inferior lingula which may be inflammatory and short-term follow-up CT was recommended. Otherwise no evidence of recurrent or metastatic disease.  - Will repeat CT imaging in three months per patient's request instead of a short interval follow-up which I recommended based on recent CT surveillance imaging which showed new nodule which may be inflammatory or reactive.     ACO / KAYKAY/Other  Quality measures  -  Memo Steven  reports that he has never smoked. He has never been exposed to tobacco smoke. He has never used smokeless tobacco.   -  Memo Steven received 2024 flu vaccine.  -  Memo Steven reports a pain score of 0.  Given his pain assessment as noted, treatment options were discussed and the  following options were decided upon as a follow-up plan to address the patient's pain: continuation of current treatment plan for pain.  -  Current outpatient and discharge medications have been reconciled for the patient.  Reviewed by: Ila Monterroso MD      I will have the patient return in follow up appointment to review test results in three months with CT C/A/P with oral and IV contrast. He understands that should he have any questions or concerns prior to his appointment he should give us a call at any time and I would be happy to see him sooner. It was a pleasure to see this patient in clinic today, thank you for allowing me to participate in the care of this patient.      Ila Montreroso MD   05/09/25   16:37 EDT

## 2025-05-06 DIAGNOSIS — C18.9 MALIGNANT NEOPLASM OF COLON, UNSPECIFIED PART OF COLON: Primary | ICD-10-CM

## 2025-05-07 ENCOUNTER — OFFICE VISIT (OUTPATIENT)
Dept: ONCOLOGY | Facility: CLINIC | Age: 80
End: 2025-05-07
Payer: MEDICARE

## 2025-05-07 ENCOUNTER — LAB (OUTPATIENT)
Dept: ONCOLOGY | Facility: CLINIC | Age: 80
End: 2025-05-07
Payer: MEDICARE

## 2025-05-07 VITALS
HEART RATE: 74 BPM | TEMPERATURE: 97.3 F | RESPIRATION RATE: 18 BRPM | WEIGHT: 151 LBS | BODY MASS INDEX: 22.88 KG/M2 | DIASTOLIC BLOOD PRESSURE: 82 MMHG | SYSTOLIC BLOOD PRESSURE: 113 MMHG | OXYGEN SATURATION: 98 % | HEIGHT: 68 IN

## 2025-05-07 DIAGNOSIS — C18.9 MALIGNANT NEOPLASM OF COLON, UNSPECIFIED PART OF COLON: ICD-10-CM

## 2025-05-07 DIAGNOSIS — R59.0 MEDIASTINAL LYMPHADENOPATHY: Primary | ICD-10-CM

## 2025-05-07 DIAGNOSIS — R59.0 MEDIASTINAL ADENOPATHY: Primary | ICD-10-CM

## 2025-05-07 DIAGNOSIS — C18.9 MALIGNANT NEOPLASM OF COLON, UNSPECIFIED PART OF COLON: Primary | ICD-10-CM

## 2025-05-07 DIAGNOSIS — R59.0 MEDIASTINAL LYMPHADENOPATHY: ICD-10-CM

## 2025-05-07 DIAGNOSIS — R59.0 MEDIASTINAL ADENOPATHY: ICD-10-CM

## 2025-05-07 LAB
ALBUMIN SERPL-MCNC: 4.2 G/DL (ref 3.5–5.2)
ALBUMIN/GLOB SERPL: 1.3 G/DL
ALP SERPL-CCNC: 72 U/L (ref 39–117)
ALT SERPL W P-5'-P-CCNC: 15 U/L (ref 1–41)
ANION GAP SERPL CALCULATED.3IONS-SCNC: 9.3 MMOL/L (ref 5–15)
AST SERPL-CCNC: 21 U/L (ref 1–40)
BASOPHILS # BLD AUTO: 0.07 10*3/MM3 (ref 0–0.2)
BASOPHILS NFR BLD AUTO: 0.9 % (ref 0–1.5)
BILIRUB SERPL-MCNC: 0.3 MG/DL (ref 0–1.2)
BUN SERPL-MCNC: 16 MG/DL (ref 8–23)
BUN/CREAT SERPL: 16.8 (ref 7–25)
CALCIUM SPEC-SCNC: 9 MG/DL (ref 8.6–10.5)
CHLORIDE SERPL-SCNC: 99 MMOL/L (ref 98–107)
CO2 SERPL-SCNC: 27.7 MMOL/L (ref 22–29)
CREAT SERPL-MCNC: 0.95 MG/DL (ref 0.76–1.27)
DEPRECATED RDW RBC AUTO: 45.9 FL (ref 37–54)
EGFRCR SERPLBLD CKD-EPI 2021: 81.9 ML/MIN/1.73
EOSINOPHIL # BLD AUTO: 0.06 10*3/MM3 (ref 0–0.4)
EOSINOPHIL NFR BLD AUTO: 0.8 % (ref 0.3–6.2)
ERYTHROCYTE [DISTWIDTH] IN BLOOD BY AUTOMATED COUNT: 13 % (ref 12.3–15.4)
GLOBULIN UR ELPH-MCNC: 3.3 GM/DL
GLUCOSE SERPL-MCNC: 104 MG/DL (ref 65–99)
HCT VFR BLD AUTO: 42 % (ref 37.5–51)
HGB BLD-MCNC: 14.1 G/DL (ref 13–17.7)
IMM GRANULOCYTES # BLD AUTO: 0.03 10*3/MM3 (ref 0–0.05)
IMM GRANULOCYTES NFR BLD AUTO: 0.4 % (ref 0–0.5)
LYMPHOCYTES # BLD AUTO: 2.25 10*3/MM3 (ref 0.7–3.1)
LYMPHOCYTES NFR BLD AUTO: 28.3 % (ref 19.6–45.3)
MCH RBC QN AUTO: 32.2 PG (ref 26.6–33)
MCHC RBC AUTO-ENTMCNC: 33.6 G/DL (ref 31.5–35.7)
MCV RBC AUTO: 95.9 FL (ref 79–97)
MONOCYTES # BLD AUTO: 0.73 10*3/MM3 (ref 0.1–0.9)
MONOCYTES NFR BLD AUTO: 9.2 % (ref 5–12)
NEUTROPHILS NFR BLD AUTO: 4.81 10*3/MM3 (ref 1.7–7)
NEUTROPHILS NFR BLD AUTO: 60.4 % (ref 42.7–76)
NRBC BLD AUTO-RTO: 0 /100 WBC (ref 0–0.2)
PLATELET # BLD AUTO: 198 10*3/MM3 (ref 140–450)
PMV BLD AUTO: 11.1 FL (ref 6–12)
POTASSIUM SERPL-SCNC: 4.2 MMOL/L (ref 3.5–5.2)
PROT SERPL-MCNC: 7.5 G/DL (ref 6–8.5)
RBC # BLD AUTO: 4.38 10*6/MM3 (ref 4.14–5.8)
SODIUM SERPL-SCNC: 136 MMOL/L (ref 136–145)
WBC NRBC COR # BLD AUTO: 7.95 10*3/MM3 (ref 3.4–10.8)

## 2025-05-07 PROCEDURE — 85025 COMPLETE CBC W/AUTO DIFF WBC: CPT | Performed by: INTERNAL MEDICINE

## 2025-05-07 PROCEDURE — 82378 CARCINOEMBRYONIC ANTIGEN: CPT | Performed by: INTERNAL MEDICINE

## 2025-05-07 PROCEDURE — 80053 COMPREHEN METABOLIC PANEL: CPT | Performed by: INTERNAL MEDICINE

## 2025-05-07 NOTE — PROGRESS NOTES
Venipuncture Blood Specimen Collection  Venipuncture performed in right arm by Beth Izquierdo MA with good hemostasis. Patient tolerated the procedure well without complications.   05/07/25   Beth Izquierdo MA

## 2025-05-08 LAB — CEA SERPL-MCNC: 1.53 NG/ML

## 2025-07-31 ENCOUNTER — HOSPITAL ENCOUNTER (OUTPATIENT)
Dept: CT IMAGING | Facility: HOSPITAL | Age: 80
Discharge: HOME | End: 2025-07-31
Payer: MEDICARE

## 2025-07-31 DIAGNOSIS — C18.9 MALIGNANT NEOPLASM OF COLON, UNSPECIFIED PART OF COLON: ICD-10-CM

## 2025-07-31 DIAGNOSIS — R59.0 MEDIASTINAL LYMPHADENOPATHY: ICD-10-CM

## 2025-07-31 PROCEDURE — 74176 CT ABD & PELVIS W/O CONTRAST: CPT

## 2025-07-31 PROCEDURE — 71250 CT THORAX DX C-: CPT

## 2025-08-07 ENCOUNTER — LAB (OUTPATIENT)
Dept: ONCOLOGY | Facility: CLINIC | Age: 80
End: 2025-08-07
Payer: MEDICARE

## 2025-08-07 ENCOUNTER — OFFICE VISIT (OUTPATIENT)
Dept: ONCOLOGY | Facility: CLINIC | Age: 80
End: 2025-08-07
Payer: MEDICARE

## 2025-08-07 VITALS
SYSTOLIC BLOOD PRESSURE: 117 MMHG | HEIGHT: 68 IN | OXYGEN SATURATION: 95 % | RESPIRATION RATE: 18 BRPM | TEMPERATURE: 97.7 F | BODY MASS INDEX: 23.28 KG/M2 | WEIGHT: 153.6 LBS | DIASTOLIC BLOOD PRESSURE: 74 MMHG | HEART RATE: 93 BPM

## 2025-08-07 DIAGNOSIS — C18.9 MALIGNANT NEOPLASM OF COLON, UNSPECIFIED PART OF COLON: Primary | ICD-10-CM

## 2025-08-07 DIAGNOSIS — R59.0 MEDIASTINAL LYMPHADENOPATHY: ICD-10-CM

## (undated) DEVICE — GLV SURG SENSICARE PI MIC PF SZ6.5 LF STRL

## (undated) DEVICE — SYR LUER SLPTP 50ML

## (undated) DEVICE — TBG PENCL TELESCP MEGADYNE SMOKE EVAC 10FT

## (undated) DEVICE — TRAP FLD MINIVAC MEGADYNE 100ML

## (undated) DEVICE — SUT PDS 1 CTX 36IN VIO PDP371T

## (undated) DEVICE — SINGLE USE SUCTION VALVE MAJ-209: Brand: SINGLE USE SUCTION VALVE (STERILE)

## (undated) DEVICE — LAP PORT CLOSURE GUIDES 5MM AND 10/12MM: Brand: LAP PORT CLOSURE GUIDES 5MM AND 10/12MM

## (undated) DEVICE — Device

## (undated) DEVICE — TUBING, SUCTION, 1/4" X 10', STRAIGHT: Brand: MEDLINE

## (undated) DEVICE — [HIGH FLOW INSUFFLATOR,  DO NOT USE IF PACKAGE IS DAMAGED,  KEEP DRY,  KEEP AWAY FROM SUNLIGHT,  PROTECT FROM HEAT AND RADIOACTIVE SOURCES.]: Brand: PNEUMOSURE

## (undated) DEVICE — PK LAP LASR CHOLE 10

## (undated) DEVICE — SUT VIC 3/0 SH CR8 27IN VCP784D

## (undated) DEVICE — TOWEL,OR,DSP,ST,NATURAL,DLX,4/PK,20PK/CS: Brand: MEDLINE

## (undated) DEVICE — GLV SURG SENSICARE PI ORTHO PF SZ7 LF STRL

## (undated) DEVICE — SAFESECURE,SECUREMENT,FOLEY CATH,STERILE: Brand: MEDLINE

## (undated) DEVICE — CLTH CLENS READYCLEANSE PERI CARE PK/5

## (undated) DEVICE — SUT VIC 0 CTD BR 18IN UNDYE VCP724D

## (undated) DEVICE — SYSTEM 1200 "Y" SMARTFILTER: Brand: SYSTEM 1200

## (undated) DEVICE — DRSNG WND GZ PAD BORDERED 4X8IN STRL

## (undated) DEVICE — LAPAROVUE VISIBILITY SYSTEM LAPAROSCOPIC SOLUTIONS: Brand: LAPAROVUE

## (undated) DEVICE — MEDI-VAC YANKAUER SUCTION HANDLE: Brand: CARDINAL HEALTH

## (undated) DEVICE — POOLE SUCTION INSTRUMENT WITH REMOVABLE SHEATH: Brand: POOLE

## (undated) DEVICE — JELLY,LUBE,STERILE,FLIP TOP,TUBE,2-OZ: Brand: MEDLINE

## (undated) DEVICE — GOWN,REINFORCE,POLY,SIRUS,BREATH SLV,XLG: Brand: MEDLINE

## (undated) DEVICE — GOWN,REINF,POLY,ECL,PP SLV,XL: Brand: MEDLINE

## (undated) DEVICE — SUT MNCRYL PLS ANTIB UD 4/0 PS2 18IN

## (undated) DEVICE — ANTIBACTERIAL UNDYED BRAIDED (POLYGLACTIN 910), SYNTHETIC ABSORBABLE SUTURE: Brand: COATED VICRYL

## (undated) DEVICE — ENSEAL 20 CM SHAFT, LARGE JAW: Brand: ENSEAL X1

## (undated) DEVICE — ADAPT SWVL FIBROPTIC BRONCH

## (undated) DEVICE — PREP SOL POVIDONE/IODINE BT 4OZ

## (undated) DEVICE — 3M™ IOBAN™ 2 ANTIMICROBIAL INCISE DRAPE 6650EZ: Brand: IOBAN™ 2

## (undated) DEVICE — DRSNG WND BORDR/ADHS NONADHR/GZ LF 2X2IN STRL

## (undated) DEVICE — ENDOPATH XCEL BLADELESS TROCARS WITH STABILITY SLEEVES: Brand: ENDOPATH XCEL

## (undated) DEVICE — ENDOPATH XCEL UNIVERSAL TROCAR STABLILITY SLEEVES: Brand: ENDOPATH XCEL

## (undated) DEVICE — TOWEL,OR,DSP,ST,BLUE,STD,4/PK,20PK/CS: Brand: MEDLINE

## (undated) DEVICE — SINGLE USE BIOPSY VALVE MAJ-210: Brand: SINGLE USE BIOPSY VALVE (STERILE)

## (undated) DEVICE — PROXIMATE RH ROTATING HEAD SKIN STAPLERS (35 WIDE) CONTAINS 35 STAINLESS STEEL STAPLES: Brand: PROXIMATE

## (undated) DEVICE — MARYLAND JAW LAPAROSCOPIC SEALER/DIVIDER COATED: Brand: LIGASURE

## (undated) DEVICE — TOTAL TRAY, 16FR 10ML SIL FOLEY, URN: Brand: MEDLINE

## (undated) DEVICE — BLANKT WARM UPPR/BDY ARM/OUT 57X196CM

## (undated) DEVICE — BRUSH CYTO BRONCOSCOPE

## (undated) DEVICE — INTENDED FOR TISSUE SEPARATION, AND OTHER PROCEDURES THAT REQUIRE A SHARP SURGICAL BLADE TO PUNCTURE OR CUT.: Brand: BARD-PARKER ® STAINLESS STEEL BLADES

## (undated) DEVICE — Device: Brand: SINGLE USE ASPIRATION NEEDLE NA-U401SX

## (undated) DEVICE — ACCESS PLATFORM FOR MINIMALLY INVASIVE SURGERY.: Brand: GELPORT® LAPAROSCOPIC  SYSTEM

## (undated) DEVICE — FRCP BX RADJAW4 PULM WO NDL STD1.8X2 100